# Patient Record
Sex: FEMALE | Race: WHITE | NOT HISPANIC OR LATINO | Employment: FULL TIME | URBAN - METROPOLITAN AREA
[De-identification: names, ages, dates, MRNs, and addresses within clinical notes are randomized per-mention and may not be internally consistent; named-entity substitution may affect disease eponyms.]

---

## 2017-01-06 NOTE — PROGRESS NOTES
"  SUBJECTIVE:                                                    Lucero Ku is a 31 year old female who presents to clinic today for the following health issues:      Acute Illness   Acute illness concerns: recheck ear  Onset: month     Fever: no     Chills/Sweats: no     Headache (location?): no    Sinus Pressure:no    Conjunctivitis:  no    Ear Pain: YES: left    Rhinorrhea: no     Congestion: no     Sore Throat: no      Cough: no    Wheeze: no     Decreased Appetite: no     Nausea: no     Vomiting: no     Diarrhea:  no     Dysuria/Freq.: no     Fatigue/Achiness: no     Sick/Strep Exposure: no      Therapies Tried and outcome: antibiotics, heating pads     Pt continues to have fluid in her left ear.  Hadn't been hurting, but started again in the last few days.  She just had some tooth work done on the left side, so that might be contributing.  She feels like there's a \"zit\" inside her ear.      Pt didn't notice any improvement with Flonase, decongestants, antihistamines.  Previously was on amoxicillin and Cipro otic.      Problem list and histories reviewed & adjusted, as indicated.  Additional history: as documented    Current Outpatient Prescriptions   Medication Sig Dispense Refill     desogestrel-ethinyl estradiol (APRI) 0.15-30 MG-MCG per tablet Take 1 tablet by mouth daily       fluticasone (FLONASE) 50 MCG/ACT spray Spray 1-2 sprays into both nostrils daily 16 g 11     metFORMIN (GLUCOPHAGE-XR) 500 MG 24 hr tablet Take 1 tablet (500 mg) by mouth daily (with dinner) (Patient taking differently: Take 1,000 mg by mouth daily (with dinner) ) 90 tablet 1     Recent Labs   Lab Test  11/14/16   1030  08/15/16   1008   07/14/14   0913   LDL   --    --    --   90   HDL   --    --    --   43*   TRIG   --    --    --   69   TSH  2.03  1.76   < >   --     < > = values in this interval not displayed.      BP Readings from Last 3 Encounters:   01/11/17 120/82   12/15/16 108/64   12/01/16 132/72    Wt Readings from " "Last 3 Encounters:   01/11/17 180 lb (81.647 kg)   12/15/16 186 lb 9.6 oz (84.641 kg)   11/21/16 186 lb 15.2 oz (84.8 kg)                 ROS:  Constitutional, HEENT, cardiovascular, pulmonary, gi and gu systems are negative, except as otherwise noted.    OBJECTIVE:                                                    /82 mmHg  Pulse 88  Temp(Src) 98.2  F (36.8  C) (Temporal)  Resp 16  Ht 5' 4.86\" (1.647 m)  Wt 180 lb (81.647 kg)  BMI 30.10 kg/m2  Breastfeeding? No  Body mass index is 30.1 kg/(m^2).  GENERAL: healthy, alert and no distress  EYES: Eyes grossly normal to inspection, PERRL and conjunctivae and sclerae normal  HENT: ear canals and TM's normal, nose and mouth without ulcers or lesions.  Some tympanosclerosis, slight effusion on right.    Diagnostic Test Results:  Results for orders placed or performed in visit on 11/21/16   Thyroid peroxidase antibody   Result Value Ref Range    Thyroid Peroxidase Antibody 430 (H) <35 IU/mL   Anti thyroglobulin antibody   Result Value Ref Range    Thyroglobulin Antibody <20 <40 IU/mL        ASSESSMENT/PLAN:                                                      BMI:   Estimated body mass index is 30.1 kg/(m^2) as calculated from the following:    Height as of this encounter: 5' 4.86\" (1.647 m).    Weight as of this encounter: 180 lb (81.647 kg).   Weight management plan: Discussed healthy diet and exercise guidelines and patient will follow up in 6 months in clinic to re-evaluate.        ICD-10-CM    1. Middle ear effusion, right H65.91 predniSONE (DELTASONE) 20 MG tablet     TYMPANOMETRY   2. PCOS (polycystic ovarian syndrome) E28.2 metFORMIN (GLUCOPHAGE-XR) 500 MG 24 hr tablet     1.  Chronic, but seems to possibly be resolving on tympanometry today.  Given her flight tomorrow, will treat with prednisone.  She has had issues in childhood with this, but doesn't sound like true allergy.  Will try this cautiously.  Pt is aware of potential risk, but feels " "comfortable as she's been on nasal and IM steroids previously without problem.  If her effusion isn't improving, refer to ENT for possible other treatment.  Her \"zit\" pain is not appreciated on exam today.  Need to consider possibility of trigeminal neuralgia.  2.  Refilled metformin.  Discussed possible upcoming IVF, risk of Z[place] given her 's travel to [facility] several months ago.  He has been asymptomatic, but can't state that there's no risk.            Patient Instructions   Thank you for visiting [facility]    Take the steroids until they're gone.  Let me know if problems.    If not improving, then we should get you in with ENT for possible ear tubes, but your tympanogram isn't too bad today.    Can continue other treatments as desired.    Please see me in 1  month for follow up if needed.       If you had imaging scheduled please refer to your radiology prep sheet.    Appointment    Date_______________     Time_____________    Day:   M TU W TH F    With____________________________    Location_________________________    If you need medication refills, please contact your pharmacy 3 days before your prescriptions runs out. If you are out of refills, your pharmacy will contact contact the clinic.    Contact us or return if questions or concerns.     -Your Care Team:  MD Jasmyne Lamas PA-C Folake Falaki, MD Anoshirvan Mazhari, MD Kelly White, CNP    General information about your clinic      Clinic hours:     Lab hours:  Phone [phone]  Monday 7:30 am-7 pm    Monday 8:30 am-6:30 pm  Tuesday-Friday 7:30 am-5 pm   Tuesday-Friday 8:30 am-4:30 pm    Pharmacy hours:  Phone [phone]  Monday 8:30 am-7pm  Tuesday-Friday 8:30am-6 pm                                       Mychart assistance [phone]        We would like to hear from you, how was your visit today?    Tomasa Pickett  Patient Information Supervisor   Patient Care " Supervisor  [facility], and [facility], [place], and [facility]  [phone]           Elmer Davalos MD, MD  [facility]

## 2017-01-11 ENCOUNTER — OFFICE VISIT (OUTPATIENT)
Dept: FAMILY MEDICINE | Facility: OTHER | Age: 32
End: 2017-01-11
Payer: COMMERCIAL

## 2017-01-11 VITALS
RESPIRATION RATE: 16 BRPM | BODY MASS INDEX: 29.99 KG/M2 | DIASTOLIC BLOOD PRESSURE: 82 MMHG | TEMPERATURE: 98.2 F | HEIGHT: 65 IN | SYSTOLIC BLOOD PRESSURE: 120 MMHG | HEART RATE: 88 BPM | WEIGHT: 180 LBS

## 2017-01-11 DIAGNOSIS — H65.91 MIDDLE EAR EFFUSION, RIGHT: Primary | ICD-10-CM

## 2017-01-11 DIAGNOSIS — E06.3 HASHIMOTO'S THYROIDITIS: ICD-10-CM

## 2017-01-11 DIAGNOSIS — E28.2 PCOS (POLYCYSTIC OVARIAN SYNDROME): ICD-10-CM

## 2017-01-11 LAB
T4 FREE SERPL-MCNC: 1.08 NG/DL (ref 0.76–1.46)
TSH SERPL DL<=0.05 MIU/L-ACNC: 2.9 MU/L (ref 0.4–4)

## 2017-01-11 PROCEDURE — 84443 ASSAY THYROID STIM HORMONE: CPT | Performed by: INTERNAL MEDICINE

## 2017-01-11 PROCEDURE — 99213 OFFICE O/P EST LOW 20 MIN: CPT | Performed by: FAMILY MEDICINE

## 2017-01-11 PROCEDURE — 92567 TYMPANOMETRY: CPT | Performed by: FAMILY MEDICINE

## 2017-01-11 PROCEDURE — 84439 ASSAY OF FREE THYROXINE: CPT | Performed by: INTERNAL MEDICINE

## 2017-01-11 PROCEDURE — 36415 COLL VENOUS BLD VENIPUNCTURE: CPT | Performed by: INTERNAL MEDICINE

## 2017-01-11 RX ORDER — PREDNISONE 20 MG/1
40 TABLET ORAL DAILY
Qty: 10 TABLET | Refills: 0 | Status: SHIPPED | OUTPATIENT
Start: 2017-01-11 | End: 2017-01-16

## 2017-01-11 RX ORDER — METFORMIN HCL 500 MG
1500 TABLET, EXTENDED RELEASE 24 HR ORAL
Qty: 270 TABLET | Refills: 1 | Status: SHIPPED | OUTPATIENT
Start: 2017-01-11 | End: 2017-05-31

## 2017-01-11 ASSESSMENT — PAIN SCALES - GENERAL: PAINLEVEL: MODERATE PAIN (5)

## 2017-01-11 NOTE — MR AVS SNAPSHOT
After Visit Summary   1/11/2017    Lucero Ku    MRN: [id number]           Patient Information     Date Of Birth          1985        Visit Information        Provider Department      1/11/2017 9:30 AM Elmer Davalos MD [facility]        Today's Diagnoses     Middle ear effusion, right    -  1     PCOS (polycystic ovarian syndrome)           Care Instructions    Thank you for visiting [facility]    Take the steroids until they're gone.  Let me know if problems.    If not improving, then we should get you in with ENT for possible ear tubes, but your tympanogram isn't too bad today.    Can continue other treatments as desired.    Please see me in 1  month for follow up if needed.       If you had imaging scheduled please refer to your radiology prep sheet.    Appointment    Date_______________     Time_____________    Day:   M TU W TH F    With____________________________    Location_________________________    If you need medication refills, please contact your pharmacy 3 days before your prescriptions runs out. If you are out of refills, your pharmacy will contact contact the clinic.    Contact us or return if questions or concerns.     -Your Care Team:  MD Jasmyne Lamas PA-C Folake Falaki, MD Anoshirvan Mazhari, MD Chastity Almaraz, CNP    General information about your clinic      Clinic hours:     Lab hours:  Phone [phone]  Monday 7:30 am-7 pm    Monday 8:30 am-6:30 pm  Tuesday-Friday 7:30 am-5 pm   Tuesday-Friday 8:30 am-4:30 pm    Pharmacy hours:  Phone [phone]  Monday 8:30 am-7pm  Tuesday-Friday 8:30am-6 pm                                       Mychart assistance [phone]        We would like to hear from you, how was your visit today?    Tomasa Pickett  Patient Information Supervisor   Patient Care Supervisor  [facility], and [facility], and [facility]  "[facility]  [phone]           Follow-ups after your visit        Your next 10 appointments already scheduled     Jan 20, 2017  2:00 PM   Return Visit with Santiago Escamilla MD   [email or web address] ([facility] [[email or web address]])    [address] [address] [place] [address] [phone]              Who to contact     If you have questions or need follow up information about today's clinic visit or your schedule please contact [facility] directly at [phone].  Normal or non-critical lab and imaging results will be communicated to you by [employer], letter or phone within 4 business days after the clinic has received the results. If you do not hear from us within 7 days, please contact the clinic through [employer] or phone. If you have a critical or abnormal lab result, we will notify you by phone as soon as possible.  Submit refill requests through [employer] or call your pharmacy and they will forward the refill request to us. Please allow 3 business days for your refill to be completed.          Additional Information About Your Visit        MyChart Information     [employer] gives you secure access to your electronic health record. If you see a primary care provider, you can also send messages to your care team and make appointments. If you have questions, please call your primary care clinic.  If you do not have a primary care provider, please call [phone] and they will assist you.        Care EveryWhere ID     This is your Care EveryWhere ID. This could be used by other organizations to access your [place] medical records  [id number]        Your Vitals Were     Pulse Temperature Respirations Height BMI (Body Mass Index) Breastfeeding?    88 98.2  F (36.8  C) (Temporal) 16 5' 4.86\" (1.647 m) 30.10 kg/m2 No       Blood Pressure from Last 3 Encounters:   01/11/17 120/82   12/15/16 108/64   12/01/16 132/72    Weight from Last 3 Encounters:   01/11/17 " 180 lb (81.647 kg)   12/15/16 186 lb 9.6 oz (84.641 kg)   11/21/16 186 lb 15.2 oz (84.8 kg)              Today, you had the following     No orders found for display         Today's Medication Changes          These changes are accurate as of: 1/11/17  9:41 AM.  If you have any questions, ask your nurse or doctor.               Start taking these medicines.        Dose/Directions    predniSONE 20 MG tablet   Commonly known as:  DELTASONE   Used for:  Middle ear effusion, right   Started by:  Elmer Davalos MD        Dose:  40 mg   Take 2 tablets (40 mg) by mouth daily for 5 days   Quantity:  10 tablet   Refills:  0         These medicines have changed or have updated prescriptions.        Dose/Directions    metFORMIN 500 MG 24 hr tablet   Commonly known as:  GLUCOPHAGE-XR   This may have changed:  how much to take   Used for:  PCOS (polycystic ovarian syndrome)   Changed by:  Elmer Davalos MD        Dose:  1500 mg   Take 3 tablets (1,500 mg) by mouth daily (with dinner)   Quantity:  270 tablet   Refills:  1            Where to get your medicines      These medications were sent to [facility]  [facility] - [address]   [address] [place] [address]     Phone:  [phone]    - metFORMIN 500 MG 24 hr tablet  - predniSONE 20 MG tablet             Primary Care Provider    None       No address on file        Thank you!     Thank you for choosing [facility]  for your care. Our goal is always to provide you with excellent care. Hearing back from our patients is one way we can continue to improve our services. Please take a few minutes to complete the written survey that you may receive in the mail after your visit with us. Thank you!             Your Updated Medication List - Protect others around you: Learn how to safely use, store and throw away your medicines at www.disposemymeds.org.          This list is accurate as of: 1/11/17  9:41 AM.  Always  use your most recent med list.                   Brand Name Dispense Instructions for use    desogestrel-ethinyl estradiol 0.15-30 MG-MCG per tablet    APRI     Take 1 tablet by mouth daily       fluticasone 50 MCG/ACT spray    FLONASE    16 g    Spray 1-2 sprays into both nostrils daily       metFORMIN 500 MG 24 hr tablet    GLUCOPHAGE-XR    270 tablet    Take 3 tablets (1,500 mg) by mouth daily (with dinner)       predniSONE 20 MG tablet    DELTASONE    10 tablet    Take 2 tablets (40 mg) by mouth daily for 5 days

## 2017-01-11 NOTE — PATIENT INSTRUCTIONS
Thank you for visiting [facility]    Take the steroids until they're gone.  Let me know if problems.    If not improving, then we should get you in with ENT for possible ear tubes, but your tympanogram isn't too bad today.    Can continue other treatments as desired.    Please see me in 1  month for follow up if needed.       If you had imaging scheduled please refer to your radiology prep sheet.    Appointment    Date_______________     Time_____________    Day:   M TU W TH F    With____________________________    Location_________________________    If you need medication refills, please contact your pharmacy 3 days before your prescriptions runs out. If you are out of refills, your pharmacy will contact contact the clinic.    Contact us or return if questions or concerns.     -Your Care Team:  MD Jasmyne Lamas PA-C Folake Falaki, MD Anoshirvan Mazhari, MD Kelly White, NOLBERTO    General information about your clinic      Clinic hours:     Lab hours:  Phone [phone]  Monday 7:30 am-7 pm    Monday 8:30 am-6:30 pm  Tuesday-Friday 7:30 am-5 pm   Tuesday-Friday 8:30 am-4:30 pm    Pharmacy hours:  Phone [phone]  Monday 8:30 am-7pm  Tuesday-Friday 8:30am-6 pm                                       Mychart assistance [phone]        We would like to hear from you, how was your visit today?    Tomasa Pickett  Patient Information Supervisor   Patient Care Supervisor  [facility] [place], and [facility], and [facility]  [phone]

## 2017-01-11 NOTE — NURSING NOTE
"Chief Complaint   Patient presents with     Ear Problem     RECHECK     Panel Management     up to date       Initial /82 mmHg  Pulse 88  Temp(Src) 98.2  F (36.8  C) (Temporal)  Resp 16  Ht 5' 4.86\" (1.647 m)  Wt 180 lb (81.647 kg)  BMI 30.10 kg/m2  Breastfeeding? No Estimated body mass index is 30.1 kg/(m^2) as calculated from the following:    Height as of this encounter: 5' 4.86\" (1.647 m).    Weight as of this encounter: 180 lb (81.647 kg).  BP completed using cuff size: regular      Allison Ignacio LPN      "

## 2017-01-20 ENCOUNTER — OFFICE VISIT (OUTPATIENT)
Dept: ENDOCRINOLOGY | Facility: CLINIC | Age: 32
End: 2017-01-20
Payer: COMMERCIAL

## 2017-01-20 VITALS
HEART RATE: 90 BPM | HEIGHT: 64 IN | SYSTOLIC BLOOD PRESSURE: 141 MMHG | BODY MASS INDEX: 32.41 KG/M2 | DIASTOLIC BLOOD PRESSURE: 82 MMHG | WEIGHT: 189.82 LBS

## 2017-01-20 DIAGNOSIS — E03.9 ACQUIRED HYPOTHYROIDISM: Primary | ICD-10-CM

## 2017-01-20 PROCEDURE — 99213 OFFICE O/P EST LOW 20 MIN: CPT | Performed by: INTERNAL MEDICINE

## 2017-01-20 NOTE — MR AVS SNAPSHOT
After Visit Summary   1/20/2017    Lucero Ku    MRN: [id number]           Patient Information     Date Of Birth          1985        Visit Information        Provider Department      1/20/2017 2:00 PM Santiago Escamilla MD [facility]        Today's Diagnoses     Acquired hypothyroidism    -  1       Care Instructions    Labs prior to the next visit.     Thank you for choosing the [facility] Department of Endocrinology. It has been a pleasure servicing you today. Please contact us at [phone] with any questions. If you need to speak to an Endocrinologist and it is after 5:00 pm or on a weekend, please call the On-Call Endocrinologist at [phone].          Follow-ups after your visit        Follow-up notes from your care team     Return in about 3 months (around 4/20/2017).      Your next 10 appointments already scheduled     Feb 01, 2017  9:00 AM   New Visit with Vamshi Wright MD   [facility]    [address] [place] [address] [phone]            Apr 21, 2017 10:30 AM   LAB with NL LAB [facility] ([facility])    [address] [facility] [address] [phone]           Patient must bring picture ID.  Patient should be prepared to give a urine specimen  Please do not eat 10-12 hours before your appointment if you are coming in fasting for labs on lipids, cholesterol, or glucose (sugar).  Pregnant women should follow their Care Team instructions. Water with medications is okay. Do not drink coffee or other fluids.   If you have concerns about taking  your medications, please ask at office or if scheduling via [employer], send a message by clicking on Secure Messaging, Message Your Care Team.            Apr 28, 2017  8:30 AM   Return Visit with MD PROSPER Luo [facility] RAMÍREZ  [facility]    [address] [address] [place] [address] [phone]              Future tests that were ordered for you today     Open Standing Orders        Priority Remaining Interval Expires Ordered    TSH with free T4 reflex Routine 4/4 12 months 1/20/2018 1/20/2017          Open Future Orders        Priority Expected Expires Ordered    Tissue transglutaminase maulik IgA and IgG Routine  1/20/2018 1/20/2017            Who to contact     If you have questions or need follow up information about today's clinic visit or your schedule please contact [facility] directly at [phone].  Normal or non-critical lab and imaging results will be communicated to you by [employer], letter or phone within 4 business days after the clinic has received the results. If you do not hear from us within 7 days, please contact the clinic through [employer] or phone. If you have a critical or abnormal lab result, we will notify you by phone as soon as possible.  Submit refill requests through [employer] or call your pharmacy and they will forward the refill request to us. Please allow 3 business days for your refill to be completed.          Additional Information About Your Visit        [employer] Information     [employer] gives you secure access to your electronic health record. If you see a primary care provider, you can also send messages to your care team and make appointments. If you have questions, please call your primary care clinic.  If you do not have a primary care provider, please call [phone] and they will assist you.      [employer] is an electronic gateway that provides easy, online access to your medical records. With [employer], you can request a clinic appointment, read your test results, renew a prescription or communicate with your care team.     To access your existing account, please contact your [facility] or call [phone] for assistance.        Care  "EveryWhere ID     This is your Care EveryWhere ID. This could be used by other organizations to access your [place] medical records  [id number]        Your Vitals Were     Pulse Height BMI (Body Mass Index) Last Period          90 1.626 m (5' 4\") 32.57 kg/m2 01/18/2017         Blood Pressure from Last 3 Encounters:   01/20/17 141/82   01/11/17 120/82   12/15/16 108/64    Weight from Last 3 Encounters:   01/20/17 86.1 kg (189 lb 13.1 oz)   01/11/17 81.647 kg (180 lb)   12/15/16 84.641 kg (186 lb 9.6 oz)               Primary Care Provider    None       No address on file        Thank you!     Thank you for choosing [facility]  for your care. Our goal is always to provide you with excellent care. Hearing back from our patients is one way we can continue to improve our services. Please take a few minutes to complete the written survey that you may receive in the mail after your visit with us. Thank you!             Your Updated Medication List - Protect others around you: Learn how to safely use, store and throw away your medicines at www.disposemymeds.org.          This list is accurate as of: 1/20/17  2:44 PM.  Always use your most recent med list.                   Brand Name Dispense Instructions for use    desogestrel-ethinyl estradiol 0.15-30 MG-MCG per tablet    APRI     Take 1 tablet by mouth daily       fluticasone 50 MCG/ACT spray    FLONASE    16 g    Spray 1-2 sprays into both nostrils daily       metFORMIN 500 MG 24 hr tablet    GLUCOPHAGE-XR    270 tablet    Take 3 tablets (1,500 mg) by mouth daily (with dinner)         "

## 2017-01-20 NOTE — PROGRESS NOTES
Endocrinology Follow up note:     Lucero Little is a pleasant 31 year old female who presents for a follow up for Hashimotos thyroiditis. Elevated TSH in June 2015.  However, she decided to wait prior to starting treatment.  TSH gradually improved and by January 2016 was normal.    Patient was having fatigue and some cold intolerance along with constipation when the TSH was high.  But she was not sure if this was related to her thyroid condition.During the workup TPO antibody was checked and was found to be elevated at 1545.    Lucero has a diagnosis of polycystic ovarian syndrome leading to infertility.  She currently takes metformin.  She had twins have her in vitro fertilization and is planning conception.  Since she traveled to [place] endemic area, the plan for in vitro fertilization has been postponed until April 2017.    Her recent thyroid hormone levels for been normal  Results for LUCERO LITTLE (MRN [id number]) as of 1/20/2017 15:39   Ref. Range 8/15/2016 10:08 11/14/2016 10:30 11/21/2016 12:21 1/11/2017 09:00   TSH Latest Ref Range: 0.40-4.00 mU/L 1.76 2.03  2.90   Thyroid Peroxidase Antibody Latest Ref Range: <35 IU/mL   430 (H)      Currently pt notes no dysphagia or neck fullness or pain or discomfort.  No changes recently in hair growth.  No recent changes in BMs.  No changes to temp perception. No racing heart or palpitations.    PMH/ reviewed and includes the following--  Past Medical History   Diagnosis Date     Infertility, female      in vitro, tiwn birth     PCOS (polycystic ovarian syndrome)        Soc Hx/ reviewed and includes the following--  Social History   Substance Use Topics     Smoking status: Never Smoker      Smokeless tobacco: Never Used      Comment: No smokers in home.     Alcohol Use: Yes      Comment: social       Fam Hx reviewed and includes the following--  Family History     Problem (# of Occurrences) Relation (Name,Age of Onset)    CANCER (3) Father (55): Stage IV  "Kidney (9/13), Paternal Grandmother, Paternal Grandfather    DIABETES (1) Maternal Grandfather    Hypertension (7) Mother, Father, Sister, Brother, Maternal Grandfather, Paternal Grandmother, Paternal Grandfather    Lipids (1) Maternal Grandfather: Defibrilator    Thyroid Disease (3) Father: hypothyroidism, Paternal Grandmother, Paternal Grandfather            ROS: constitutional/HEENT/repro/endo reviewed completely with pertinents as above.    PE:  Gen: NAD,  /82 mmHg  Pulse 90  Ht 1.626 m (5' 4\")  Wt 86.1 kg (189 lb 13.1 oz)  BMI 32.57 kg/m2  LMP 01/18/2017  HEENT: eomi, no scleral icteris or proptosis  Neck: s/nt.  Thyroid is palpable but without any discrete nodules.  Lymph: no cervical or supraclvicular LAD  Abd: nondistended  Ext: no tremors on outstretched hand, extremities are warm  Neuro: no resting tremor, normal gait,  nonfocal otherwise  skin: normal temp/turgor/thickness. No rashes on exposed skin.    Data:  as above    (results reviewed/discussed/explained during her visit)    A/P:   Hashimoto's thyroiditis, normal function, IVF planned at end of April 2017.  At present her TSH is less than three and does not have any symptoms of hypothyroidism.  It is unlikely that she would have any benefit from thyroid hormone supplements to improve chances of successful pregnancy.  We discussed this in great detail with the plan to recheck thyroid function test  A few weeks prior to planned procedure.   We will follow up every trimester after conception.    Santiago Escamilla MD  [address]  Endocrinology Service      "

## 2017-01-20 NOTE — PATIENT INSTRUCTIONS
Labs prior to the next visit.     Thank you for choosing the [facility], Department of Endocrinology. It has been a pleasure servicing you today. Please contact us at [phone] with any questions. If you need to speak to an Endocrinologist and it is after 5:00 pm or on a weekend, please call the On-Call Endocrinologist at [phone].

## 2017-01-20 NOTE — NURSING NOTE
"Lucero Ku's goals for this visit include: Establish Care Hashimotos  She requests these members of her care team be copied on today's visit information: NO    PCP: None    Referring Provider:  No referring provider defined for this encounter.    Chief Complaint   Patient presents with     Thyroid Problem       Initial Ht 1.626 m (5' 4\")  Wt 86.1 kg (189 lb 13.1 oz)  BMI 32.57 kg/m2  LMP 01/18/2017 Estimated body mass index is 32.57 kg/(m^2) as calculated from the following:    Height as of this encounter: 1.626 m (5' 4\").    Weight as of this encounter: 86.1 kg (189 lb 13.1 oz).  BP completed using cuff size: regular    Do you need any medication refills at today's visit? NO    "

## 2017-02-01 ENCOUNTER — OFFICE VISIT (OUTPATIENT)
Dept: AUDIOLOGY | Facility: OTHER | Age: 32
End: 2017-02-01
Payer: COMMERCIAL

## 2017-02-01 ENCOUNTER — OFFICE VISIT (OUTPATIENT)
Dept: OTOLARYNGOLOGY | Facility: OTHER | Age: 32
End: 2017-02-01
Payer: COMMERCIAL

## 2017-02-01 ENCOUNTER — OFFICE VISIT (OUTPATIENT)
Dept: ALLERGY | Facility: OTHER | Age: 32
End: 2017-02-01
Payer: COMMERCIAL

## 2017-02-01 VITALS
WEIGHT: 186.5 LBS | OXYGEN SATURATION: 100 % | HEIGHT: 65 IN | DIASTOLIC BLOOD PRESSURE: 72 MMHG | BODY MASS INDEX: 31.07 KG/M2 | SYSTOLIC BLOOD PRESSURE: 112 MMHG | HEART RATE: 85 BPM

## 2017-02-01 VITALS — HEART RATE: 70 BPM | BODY MASS INDEX: 32.43 KG/M2 | WEIGHT: 189 LBS | OXYGEN SATURATION: 100 %

## 2017-02-01 DIAGNOSIS — H92.01 PAIN IN RIGHT EAR: ICD-10-CM

## 2017-02-01 DIAGNOSIS — J31.0 CHRONIC RHINITIS: Primary | ICD-10-CM

## 2017-02-01 DIAGNOSIS — J31.0 CHRONIC RHINITIS: ICD-10-CM

## 2017-02-01 DIAGNOSIS — H69.91 DYSFUNCTION OF EUSTACHIAN TUBE, RIGHT: ICD-10-CM

## 2017-02-01 DIAGNOSIS — H93.8X1 SENSATION OF FULLNESS IN RIGHT EAR: Primary | ICD-10-CM

## 2017-02-01 DIAGNOSIS — Z01.10 EXAMINATION OF EARS AND HEARING: ICD-10-CM

## 2017-02-01 DIAGNOSIS — H69.91 ETD (EUSTACHIAN TUBE DYSFUNCTION), RIGHT: ICD-10-CM

## 2017-02-01 PROCEDURE — 36415 COLL VENOUS BLD VENIPUNCTURE: CPT | Performed by: ALLERGY & IMMUNOLOGY

## 2017-02-01 PROCEDURE — 99207 ZZC NO CHARGE LOS: CPT | Performed by: AUDIOLOGIST

## 2017-02-01 PROCEDURE — 99000 SPECIMEN HANDLING OFFICE-LAB: CPT | Performed by: ALLERGY & IMMUNOLOGY

## 2017-02-01 PROCEDURE — 92553 AUDIOMETRY AIR & BONE: CPT | Performed by: AUDIOLOGIST

## 2017-02-01 PROCEDURE — 92567 TYMPANOMETRY: CPT | Performed by: AUDIOLOGIST

## 2017-02-01 PROCEDURE — 95004 PERQ TESTS W/ALRGNC XTRCS: CPT | Performed by: ALLERGY & IMMUNOLOGY

## 2017-02-01 PROCEDURE — 86003 ALLG SPEC IGE CRUDE XTRC EA: CPT | Performed by: ALLERGY & IMMUNOLOGY

## 2017-02-01 PROCEDURE — 31231 NASAL ENDOSCOPY DX: CPT | Performed by: OTOLARYNGOLOGY

## 2017-02-01 PROCEDURE — 92555 SPEECH THRESHOLD AUDIOMETRY: CPT | Performed by: AUDIOLOGIST

## 2017-02-01 PROCEDURE — 99203 OFFICE O/P NEW LOW 30 MIN: CPT | Mod: 25 | Performed by: ALLERGY & IMMUNOLOGY

## 2017-02-01 PROCEDURE — 99203 OFFICE O/P NEW LOW 30 MIN: CPT | Mod: 25 | Performed by: OTOLARYNGOLOGY

## 2017-02-01 RX ORDER — MONTELUKAST SODIUM 10 MG/1
10 TABLET ORAL AT BEDTIME
Qty: 30 TABLET | Refills: 3 | Status: SHIPPED | OUTPATIENT
Start: 2017-02-01 | End: 2017-07-24

## 2017-02-01 NOTE — NURSING NOTE
"Chief Complaint   Patient presents with     Consult     Ear       Initial /72 mmHg  Pulse 85  Ht 5' 4.69\" (1.643 m)  Wt 186 lb 8 oz (84.596 kg)  BMI 31.34 kg/m2  SpO2 100%  LMP 01/18/2017 Estimated body mass index is 31.34 kg/(m^2) as calculated from the following:    Height as of this encounter: 5' 4.69\" (1.643 m).    Weight as of this encounter: 186 lb 8 oz (84.596 kg).  BP completed using cuff size: coretta Siddiqui MA      "

## 2017-02-01 NOTE — NURSING NOTE
"Chief Complaint   Patient presents with     Consult     Ear Problem     Recurrent ear infections.       Initial Pulse 70  Wt 85.73 kg (189 lb)  SpO2 100%  LMP 01/18/2017 Estimated body mass index is 32.43 kg/(m^2) as calculated from the following:    Height as of 1/20/17: 1.626 m (5' 4\").    Weight as of this encounter: 85.73 kg (189 lb).  BP completed using cuff size: NA (Not Taken)  "

## 2017-02-01 NOTE — ASSESSMENT & PLAN NOTE
Spring and fall rhinorrhea, congestion, sneezing, nasal itching. No ocular symptoms. Increased symptoms around raking leaves, hay and mowing grass. Also with postnasal drip. Increased right-sided ear pressure and pain over the last 1-2 months. Has had bloody nasal discharge and congestion on the right side. More postnasal drip over the last 1-2 months. Eustachian tube dysfunction. Flonase 2 sprays per nostril daily has been used for one month and not clearly helpful. Prednisone was not clearly helpful. Montelukast was started today. Antihistamine has not been helpful.    Skin testing positive for camille and cedar. All else negative.     - Skin testing results not consistent with symptoms. Will check with serum IgE for environmental allergens given symptoms consistent with mold allergy (raking leaves, hay and mowing grass bothers her).  - Changing from Flonase to Rhinocort 2 sprays/nostril twice daily. Using for the next 1 month of eustachian tube dysfunction. Following with ENT.   - Agree with trial of montelukast.   - Use Rhinocort 2 sprays/nostril daily in the spring and fall.   - Oral antihistamine as needed.

## 2017-02-01 NOTE — PROGRESS NOTES
"  SUBJECTIVE:                                                    Lucero Ku is a 31 year old female who presents to clinic today for the following health issues:      Right side otitis      Duration: Sx started end of November 2016    Description (location/character/radiation): Right ear pain, neck and face pain    Intensity:  mild    Accompanying signs and symptoms: Ear feels plugged.     History (similar episodes/previous evaluation): Otitis as a kid    Precipitating or alleviating factors: None    Therapies tried and outcome: Antibiotics, steroids Did not seem to help.        {additional problems for provider to add:855600}    Problem list and histories reviewed & adjusted, as indicated.  Additional history: {NONE - AS DOCUMENTED:067169::\"as documented\"}    {HIST REVIEW/ LINKS 2:805156}    {PROVIDER CHARTING PREFERENCE:033806}  "

## 2017-02-01 NOTE — PROGRESS NOTES
"    History of Present Illness - Lucero Ku is a 31 year old female who presents with concerns about right ear symptoms. The main symptom recently has been pressure plugging in the right ear. As a child she had several sets of tubes. In Dec she had \"ear\" infection on the right with 3 episodes of D/C clear and bloody tried abx and drops. However, still has pressure on the right with popping on occasion. No tinnitus or vertigo.  She tried oral steroids and uses Flonase but no relief.  She denies seasonal or perennial allergies.  Still has a lot of d/c postnasal lele right side and nasal congestion but no other sinusitis symptoms.  Past Medical History -   Patient Active Problem List   Diagnosis     S/P  section     PCOS (polycystic ovarian syndrome)     Screening for lipoid disorders     Hashimoto's thyroiditis       Current Medications -   Current outpatient prescriptions:      metFORMIN (GLUCOPHAGE-XR) 500 MG 24 hr tablet, Take 3 tablets (1,500 mg) by mouth daily (with dinner), Disp: 270 tablet, Rfl: 1     desogestrel-ethinyl estradiol (APRI) 0.15-30 MG-MCG per tablet, Take 1 tablet by mouth daily, Disp: , Rfl:      fluticasone (FLONASE) 50 MCG/ACT spray, Spray 1-2 sprays into both nostrils daily, Disp: 16 g, Rfl: 11    Allergies -   Allergies   Allergen Reactions     Hydrocodone-Acetaminophen Nausea and Vomiting     Reaction after taking Vicodin for tooth extraction.  Can take plain Tylenol.       Social History -   Social History     Social History     Marital Status:      Spouse Name: N/A     Number of Children: 2     Years of Education: N/A     Occupational History           [employer]     Social History Main Topics     Smoking status: Never Smoker      Smokeless tobacco: Never Used      Comment: No smokers in home.     Alcohol Use: Yes      Comment: social     Drug Use: No     Sexual Activity:     Partners: Male     Birth Control/ Protection: Pill     Other Topics Concern     " Parent/Sibling W/ Cabg, Mi Or Angioplasty Before 65f 55m? No      Service No     Blood Transfusions No     Caffeine Concern No     Occupational Exposure Yes     FTR[employer]     Hobby Hazards No     Sleep Concern No     Stress Concern No     Weight Concern No     Special Diet No     Back Care No     Exercise No     Seat Belt Yes     Social History Narrative     to [place] and lives in [place].  No smokers in the home.  No concerns about domestic violence.  Has an indoor cat.  Aware of toxoplasmosis precautions.         Family History -   Family History   Problem Relation Age of Onset     Hypertension Mother      Hypertension Father      CANCER Father 55     Stage IV Kidney (9/13)     Thyroid Disease Father      hypothyroidism     Hypertension Sister      Hypertension Brother      DIABETES Maternal Grandfather      Hypertension Maternal Grandfather      Lipids Maternal Grandfather      Defibrilator     CANCER Paternal Grandmother      Thyroid Disease Paternal Grandmother      Hypertension Paternal Grandmother      CANCER Paternal Grandfather      Thyroid Disease Paternal Grandfather      Hypertension Paternal Grandfather        Review of Systems - As per HPI and PMHx, otherwise system review of the head and neck negative.    Physical Exam  Vitals: Pulse 70  Wt 85.73 kg (189 lb)  SpO2 100%  LMP 01/18/2017  BMI= Body mass index is 32.43 kg/(m^2).    General - The patient is well nourished and well developed, and appears to have good nutritional status.  Alert and oriented to person and place, answers questions and cooperates with examination appropriately.   Head and Face - Normocephalic and atraumatic, with no gross asymmetry noted of the contour of the facial features.  The facial nerve is intact, with strong symmetric movements.  Voice and Breathing - The patient was breathing comfortably without the use of accessory muscles. There was no wheezing, stridor, or stertor.  The patients voice was clear  and strong, and had appropriate pitch and quality.  Ears - Bilateral pinna and EACs with normal appearing overlying skin. Tympanic membrane intact with good mobility on pneumatic otoscopy bilaterally. Bony landmarks of the ossicular chain are normal. The tympanic membranes are normal in appearance. No retraction, perforation, or masses.  No fluid or purulence was seen in the external canal or the middle ear.   Eyes - Extraocular movements intact.  Sclera were not icteric or injected, conjunctiva were pink and moist.  Mouth - Examination of the oral cavity showed pink, healthy oral mucosa. No lesions or ulcerations noted.  The tongue was mobile and midline, and the dentition were in good condition.    Throat - The walls of the oropharynx were smooth, pink, moist, symmetric, and had no lesions or ulcerations.  The tonsillar pillars and soft palate were symmetric.  The uvula was midline on elevation.  Neck - Normal midline excursion of the laryngotracheal complex during swallowing.  Full range of motion on passive movement.  Palpation of the occipital, submental, submandibular, internal jugular chain, and supraclavicular nodes did not demonstrate any abnormal lymph nodes or masses.  The carotid pulse was palpable bilaterally.  Palpation of the thyroid was soft and smooth, with no nodules or goiter appreciated.  The trachea was mobile and midline.  Nose - External contour is symmetric, no gross deflection or scars.  Nasal mucosa is SOMEWHAT PALE and moist with no abnormal mucus.  The septum was midline and non-obstructive, turbinates of enlarged size .  No polyps, masses, or purulence noted on examination.  Neuro - normal muscle tone, gait, CN 2 THROUGH 12 INTACT    Audio: NORMAL thresholds and tymps    To further evaluate the nasal cavity, I performed rigid nasal endoscopy.  I first sprayed the nasal cavity bilaterally with a mix of lidocaine and neosynephrine.  I then began on the left side using a 2.7mm, 30 degree  "rigid nasal endoscope.  The septum was fairly straight and the nasal airway was open.  No abnormal secretions, purulence, or polyps were noted. The left middle turbinate and middle meatus were clearly visualized and normal in appearance.  Looking up, the olfactory cleft was unobstructed.  Going further back, the sphenoethmoid recess was normal in appearance, with healthy appearing mucosa on the face of the sphenoid.  The nasopharynx was unremarkable, and the eustachian tube opening on this side was unobstructed. Eustachian tube areas clear.    I then turned my attention to the right side.  Once again, the septum was fairly straight, and the airway was open.  No abnormal secretions, purulence, polyps were noted.  The right middle turbinate and middle meatus were clearly visualized and normal in appearance.  Looking up, the olfactory cleft was unobstructed.  Going further back the right sphenoethmoid recess was normal in appearance, and eustachian tube opening was unobstructed.      ASSESSMENT/PLAN:  Lucero Ku is a 31 year old female with 2 moths h/o \"plugged\" right ear with right ETD  Dysfunction. Possibly allergic rhinitis. Will start Singulair 10 mg and increase Flonase to BID. She will see Dr. Yuan for further allergy assessment.    Return in 6 weeks  Vamshi Wright MD    "

## 2017-02-01 NOTE — MR AVS SNAPSHOT
After Visit Summary   2/1/2017    Lucero Ku    MRN: [id number]           Patient Information     Date Of Birth          1985        Visit Information        Provider Department      2/1/2017 9:00 AM Vamshi Wright MD [facility]        Today's Diagnoses     Chronic rhinitis         ETD (eustachian tube dysfunction), right            Follow-ups after your visit        Additional Services     AUDIOLOGY ADULT REFERRAL                 Your next 10 appointments already scheduled     Apr 21, 2017 10:30 AM   LAB with NL LAB [facility] ([facility]    [address] [facility] [address] [phone]           Patient must bring picture ID.  Patient should be prepared to give a urine specimen  Please do not eat 10-12 hours before your appointment if you are coming in fasting for labs on lipids, cholesterol, or glucose (sugar).  Pregnant women should follow their Care Team instructions. Water with medications is okay. Do not drink coffee or other fluids.   If you have concerns about taking  your medications, please ask at office or if scheduling via [employer], send a message by clicking on Secure Messaging, Message Your Care Team.            Apr 28, 2017  8:30 AM   [facility]    [id number] [place] [id number] [phone]              Who to contact     If you have questions or need follow up information about today's clinic visit or your schedule please contact [facility] directly at [phone].  Normal or non-critical lab and imaging results will be communicated to you by MyChart, letter or phone within 4 business days after the clinic has received the results. If you do not hear from us within 7 days, please contact the clinic through MyChart or phone. If you have a critical or abnormal lab result,  we will notify you by phone as soon as possible.  Submit refill requests through [employer] or call your pharmacy and they will forward the refill request to us. Please allow 3 business days for your refill to be completed.          Additional Information About Your Visit        [employer] Information     [employer] gives you secure access to your electronic health record. If you see a primary care provider, you can also send messages to your care team and make appointments. If you have questions, please call your primary care clinic.  If you do not have a primary care provider, please call [phone] and they will assist you.        Care EveryWhere ID     This is your Care EveryWhere ID. This could be used by other organizations to access your [facility] medical records  [id number]        Your Vitals Were     Pulse Pulse Oximetry Last Period             70 100% 01/18/2017          Blood Pressure from Last 3 Encounters:   02/01/17 112/72   01/20/17 141/82   01/11/17 120/82    Weight from Last 3 Encounters:   02/01/17 84.596 kg (186 lb 8 oz)   02/01/17 85.73 kg (189 lb)   01/20/17 86.1 kg (189 lb 13.1 oz)              We Performed the Following     AUDIOLOGY ADULT REFERRAL     NASAL ENDOSCOPY, DIAGNOSTIC          Today's Medication Changes          These changes are accurate as of: 2/1/17 11:06 AM.  If you have any questions, ask your nurse or doctor.               Start taking these medicines.        Dose/Directions    montelukast 10 MG tablet   Commonly known as:  SINGULAIR   Used for:  ETD (eustachian tube dysfunction), right, Chronic rhinitis   Started by:  Vamshi Wright MD        Dose:  10 mg   Take 1 tablet (10 mg) by mouth At Bedtime   Quantity:  30 tablet   Refills:  3            Where to get your medicines      These medications were sent to [facility] [place], [place] - [address] [facility]  [address] [facility] [address]     Phone:  [phone]    - montelukast 10 MG tablet             Primary  Care Provider    None       No address on file        Thank you!     Thank you for choosing [facility]  for your care. Our goal is always to provide you with excellent care. Hearing back from our patients is one way we can continue to improve our services. Please take a few minutes to complete the written survey that you may receive in the mail after your visit with us. Thank you!             Your Updated Medication List - Protect others around you: Learn how to safely use, store and throw away your medicines at www.disposemymeds.org.          This list is accurate as of: 2/1/17 11:06 AM.  Always use your most recent med list.                   Brand Name Dispense Instructions for use    desogestrel-ethinyl estradiol 0.15-30 MG-MCG per tablet    APRI     Take 1 tablet by mouth daily       fluticasone 50 MCG/ACT spray    FLONASE    16 g    Spray 1-2 sprays into both nostrils daily       metFORMIN 500 MG 24 hr tablet    GLUCOPHAGE-XR    270 tablet    Take 3 tablets (1,500 mg) by mouth daily (with dinner)       montelukast 10 MG tablet    SINGULAIR    30 tablet    Take 1 tablet (10 mg) by mouth At Bedtime

## 2017-02-01 NOTE — MR AVS SNAPSHOT
After Visit Summary   2/1/2017    Lucero Ku    MRN: [id number]           Patient Information     Date Of Birth          1985        Visit Information        Provider Department      2/1/2017 10:20 AM Brice Yuan,  [facility]        Today's Diagnoses     Chronic rhinitis    -  1     Dysfunction of eustachian tube, right           Care Instructions    If you have any questions regarding your allergies, asthma, or what we discussed during your visit today please call the allergy clinic or contact us via [employer].    [facility]: [phone]  [facility]: [phone]  [facility]: [phone]    Allergy RN Line ([facility]): [phone]    1. Agree Rhinocort 2 sprays/nostril twice daily. Use spring and fall 2 sprays/nostril daily.   2. Agree with Singulair.   3. Blood testing today.     AEROALLERGEN AVOIDANCE INSTRUCTIONS  MOLD  Indoors, mold season is year round. Outdoors, most mold prefer seasons with high humidity. Mold prefers damp, dark, warm places. Here are some tips on how to avoid mold exposure.  1.  Keep humidity inside between 35-50% with air conditioning or dehumidifier. The humidity level can be checked with a meter from a hardware store.   2.  Clean surfaces where mold grows and dry wet areas.  3.  Avoid steam cleaning carpets and discard moldy belongings.  4.  Wear a mask when doing yard work and refrain from walking through uncut fields or playing in leaves.  5.  Minimize use of potted plants and do not keep them indoors.  6.  Consider an allergy cover for the pillow and mattress.  POLLEN  Pollens are the tiny airborne particles given off by trees, weeds, and grasses. They can be the cause of seasonal allergic rhinitis or hay fever symptoms, which include stuffy, itchy, runny nose, redness, swelling and itching of the eyes, and itching of the ears and throat. Here are some tips on how to avoid pollen  exposure.  1. .Keep windows closed and use the air conditioner when possible.  2.  Avoid outside exposure in the early morning as pollen counts are highest at that time.  3.  Take a shower and wash hair each night.  4.  Consider wearing a mask when working in the yard and/or garden.  5.  Clean furnace filter monthly with HEPA filters. Consider a HEPA filter vacuum  which will prevent pollen from being reintroduced into the air.   DUST MITES  Dust mites can never be entirely eliminated in the house no matter how clean your house is. Dust mites are attracted to warm, moist areas and feed on dead skin flakes. Here are tips to minimize dust mites in your home.  1.  Encase pillows and mattress/box springs in zippered allergy covers.  2.  Wash bedding in hot water (at least 130 F) every 7-14 days.  3.  Avoid curtains, carpet, and upholstered furniture if possible.  4.  Use HEPA air filters and a HEPA filter vacuum . Change filters monthly. Vacuum weekly.  5.  Keep bedroom simple, avoiding clutter, so it can quickly be dusted.  6.  Cover heating vents with vent filters.  7.  Keep stuffed toys in a closed container and wash or freeze regularly.  8.  Keep clothing in the closet with the door closed.  PETS  Pets present many problems for people with allergies. Dander from pets is very difficult to remove and also is a food source for dust mites.  1.  If possible, find the pet a new home.  2.  If not possible, keep the pet outdoors. Never allow the pet into the bedroom.  3.  Wash pet weekly in warm water.  4.  Encase mattresses, pillows, and box springs in allergen-proof covers.  5.  Use HEPA air filters and a HEPA filter vacuum . Change filters monthly.            Follow-ups after your visit        Your next 10 appointments already scheduled     Apr 21, 2017 10:30 AM   LAB with NL LAB [facility] ([facility])    [address] [facility] [address]    [phone]           Patient must bring picture ID.  Patient should be prepared to give a urine specimen  Please do not eat 10-12 hours before your appointment if you are coming in fasting for labs on lipids, cholesterol, or glucose (sugar).  Pregnant women should follow their Care Team instructions. Water with medications is okay. Do not drink coffee or other fluids.   If you have concerns about taking  your medications, please ask at office or if scheduling via [employer], send a message by clicking on Secure Messaging, Message Your Care Team.            Apr 28, 2017  8:30 AM   Return Visit with Santiago Escamilla MD   [facility] ([facility])    [address] [place] [id number] [phone]              Who to contact     If you have questions or need follow up information about today's clinic visit or your schedule please contact [facility] directly at [phone].  Normal or non-critical lab and imaging results will be communicated to you by MyPar[employer]ay[employer], letter or phone within 4 business days after the clinic has received the results. If you do not hear from us within 7 days, please contact the clinic through [employer] or phone. If you have a critical or abnormal lab result, we will notify you by phone as soon as possible.  Submit refill requests through [employer] or call your pharmacy and they will forward the refill request to us. Please allow 3 business days for your refill to be completed.          Additional Information About Your Visit        [employer] Information     [employer] gives you secure access to your electronic health record. If you see a primary care provider, you can also send messages to your care team and make appointments. If you have questions, please call your primary care clinic.  If you do not have a primary care provider, please call [phone] and they will assist you.        Care EveryWhere ID     This is your Care  "EveryWhere ID. This could be used by other organizations to access your [facility] medical records  [id number]        Your Vitals Were     Pulse Height BMI (Body Mass Index) Pulse Oximetry Last Period       85 5' 4.69\" (1.643 m) 31.34 kg/m2 100% 01/18/2017        Blood Pressure from Last 3 Encounters:   02/01/17 112/72   01/20/17 141/82   01/11/17 120/82    Weight from Last 3 Encounters:   02/01/17 186 lb 8 oz (84.596 kg)   02/01/17 189 lb (85.73 kg)   01/20/17 189 lb 13.1 oz (86.1 kg)              We Performed the Following     Allergen alternaria alternata IgE     Allergen rommel white IgE     Allergen aspergillus fumigatus IgE     Allergen cat epithellium IgE     Allergen Cedar IgE     Allergen cladosporium herbarum IgE     Allergen cottonwood IgE     Allergen D farinae IgE     Allergen D pteronyssinus IgE     Allergen dog epithelium IgE     Allergen elm IgE     Allergen English plantain IgE     Allergen Epicoccum purpurascens IgE     Allergen giant ragweed IgE     Allergen lamb's quarter IgE     Allergen maple box elder IgE     Allergen Mugwort IgE     Allergen [place] White     Allergen oak white IgE     Allergen penicillium notatum IgE     Allergen ragweed short IgE     Allergen Red [place] IgE     Allergen Sagebrush Wormwood IgE     Allergen Sheep Sorrel IgE     Allergen silver  birch IgE     Allergen thistle Russian IgE     Allergen pieter IgE     Allergen [place] Tree     Allergen white pine IgE          Today's Medication Changes          These changes are accurate as of: 2/1/17 11:32 AM.  If you have any questions, ask your nurse or doctor.               Start taking these medicines.        Dose/Directions    budesonide 32 MCG/ACT spray   Commonly known as:  RHINOCORT AQUA   Used for:  Chronic rhinitis, Dysfunction of eustachian tube, right   Started by:  Brice Yuan,         Dose:  2 spray   Spray 2 sprays into both nostrils 2 times daily   Quantity:  1 Bottle   Refills:  11       montelukast 10 " MG tablet   Commonly known as:  SINGULAIR   Used for:  ETD (eustachian tube dysfunction), right, Chronic rhinitis   Started by:  Vamshi Wright MD        Dose:  10 mg   Take 1 tablet (10 mg) by mouth At Bedtime   Quantity:  30 tablet   Refills:  3         Stop taking these medicines if you haven't already. Please contact your care team if you have questions.     fluticasone 50 MCG/ACT spray   Commonly known as:  FLONASE   Stopped by:  Brice Yuan, DO                Where to get your medicines      These medications were sent to [place], [place] - [address] [place] [address]  [address] [facility] [address]     Phone:  [phone]    - budesonide 32 MCG/ACT spray  - montelukast 10 MG tablet             Primary Care Provider    None       No address on file        Thank you!     Thank you for choosing [facility]  for your care. Our goal is always to provide you with excellent care. Hearing back from our patients is one way we can continue to improve our services. Please take a few minutes to complete the written survey that you may receive in the mail after your visit with us. Thank you!             Your Updated Medication List - Protect others around you: Learn how to safely use, store and throw away your medicines at www.disposemymeds.org.          This list is accurate as of: 2/1/17 11:32 AM.  Always use your most recent med list.                   Brand Name Dispense Instructions for use    budesonide 32 MCG/ACT spray    RHINOCORT AQUA    1 Bottle    Spray 2 sprays into both nostrils 2 times daily       desogestrel-ethinyl estradiol 0.15-30 MG-MCG per tablet    APRI     Take 1 tablet by mouth daily       metFORMIN 500 MG 24 hr tablet    GLUCOPHAGE-XR    270 tablet    Take 3 tablets (1,500 mg) by mouth daily (with dinner)       montelukast 10 MG tablet    SINGULAIR    30 tablet    Take 1 tablet (10 mg) by mouth At Bedtime

## 2017-02-01 NOTE — PROGRESS NOTES
AUDIOLOGY REPORT    SUMMARY: Audiology visit completed. See audiogram for results.      RECOMMENDATIONS: Follow-up with ENT.    Aquilino Joshua  Audiologist  [place] License  #[id number]

## 2017-02-01 NOTE — PATIENT INSTRUCTIONS
If you have any questions regarding your allergies, asthma, or what we discussed during your visit today please call the allergy clinic or contact us via [employer].    [facility]: [phone]  [facility]: [phone]  [facility]: [phone]    Allergy RN Line ([facility]): [phone]    1. Agree Rhinocort 2 sprays/nostril twice daily. Use spring and fall 2 sprays/nostril daily.   2. Agree with Singulair.   3. Blood testing today.     AEROALLERGEN AVOIDANCE INSTRUCTIONS  MOLD  Indoors, mold season is year round. Outdoors, most mold prefer seasons with high humidity. Mold prefers damp, dark, warm places. Here are some tips on how to avoid mold exposure.  1.  Keep humidity inside between 35-50% with air conditioning or dehumidifier. The humidity level can be checked with a meter from a hardware store.   2.  Clean surfaces where mold grows and dry wet areas.  3.  Avoid steam cleaning carpets and discard moldy belongings.  4.  Wear a mask when doing yard work and refrain from walking through uncut fields or playing in leaves.  5.  Minimize use of potted plants and do not keep them indoors.  6.  Consider an allergy cover for the pillow and mattress.  POLLEN  Pollens are the tiny airborne particles given off by trees, weeds, and grasses. They can be the cause of seasonal allergic rhinitis or hay fever symptoms, which include stuffy, itchy, runny nose, redness, swelling and itching of the eyes, and itching of the ears and throat. Here are some tips on how to avoid pollen exposure.  1. .Keep windows closed and use the air conditioner when possible.  2.  Avoid outside exposure in the early morning as pollen counts are highest at that time.  3.  Take a shower and wash hair each night.  4.  Consider wearing a mask when working in the yard and/or garden.  5.  Clean furnace filter monthly with HEPA filters. Consider a HEPA filter vacuum  which will prevent pollen from  being reintroduced into the air.   DUST MITES  Dust mites can never be entirely eliminated in the house no matter how clean your house is. Dust mites are attracted to warm, moist areas and feed on dead skin flakes. Here are tips to minimize dust mites in your home.  1.  Encase pillows and mattress/box springs in zippered allergy covers.  2.  Wash bedding in hot water (at least 130 F) every 7-14 days.  3.  Avoid curtains, carpet, and upholstered furniture if possible.  4.  Use HEPA air filters and a HEPA filter vacuum . Change filters monthly. Vacuum weekly.  5.  Keep bedroom simple, avoiding clutter, so it can quickly be dusted.  6.  Cover heating vents with vent filters.  7.  Keep stuffed toys in a closed container and wash or freeze regularly.  8.  Keep clothing in the closet with the door closed.  PETS  Pets present many problems for people with allergies. Dander from pets is very difficult to remove and also is a food source for dust mites.  1.  If possible, find the pet a new home.  2.  If not possible, keep the pet outdoors. Never allow the pet into the bedroom.  3.  Wash pet weekly in warm water.  4.  Encase mattresses, pillows, and box springs in allergen-proof covers.  5.  Use HEPA air filters and a HEPA filter vacuum . Change filters monthly.

## 2017-02-01 NOTE — ASSESSMENT & PLAN NOTE
Right ear popping, fullness and pressure. Following with ENT. No huge benefit with oral steroids. Flonase 2 sprays/nostril daily not hugely beneficial.     - Agree with nasal steroid 2 sprays/nostril bid. Switching to Rhinocort from Flonase.   - Agree with montelukast.   - Continue ENT follow up.   - Allergy evaluation as noted.

## 2017-02-01 NOTE — Clinical Note
Allergy evaluation positive for camille and cedar. Not consistent with symptoms. Sending serum evaluation as well. Switched to rhinocort and agree with singulair. Please see note for details. Thanks.   Brice Yuan

## 2017-02-01 NOTE — PROGRESS NOTES
Lucero Ku is a 31 year old White female with previous medical history significant for PCO S, Hashimoto's thyroiditis and eustachian tube dysfunction. Lucero Ku is being seen today for evaluation of seasonal allergies. The patient is being seen in consultation at the request of Dr. Wright.     Starting in November 2016 the patient noticed increased right ear pressure and sensation of fluid behind her ear for 3 days. Subsequently she had increased pain of her right ear and had right ear rupture. Diagnosed with acute otitis media and externa. Treated with antibiotics. She had a bloody/possible discharge.continued to have right-sided pressure and popping sensation on multiple occasions. She additionally developed right-sided nasal congestion and postnasal drip. She had her rhinorrhea on her right side that was occasionally bloody. Seen by ENT today and diagnosed with eustachian tube dysfunction. She previously had been started on Flonase 2 sprays per nostril daily. This was increased by ENT to 2 sprays twice per day. Additionally she was started on montelukast. She previously tried cetirizine for one week and not helpful. Oral corticosteroids were not terribly helpful as well. Patient has a history of historically having spring and fall rhinorrhea, congestion, sneezing, nasal itching and postnasal drip. Symptoms are made worse when she is raking leaves, around hay or cutting grass. No symptoms around dogs or cats. No history of allergy evaluation.    No history of asthma, food allergies, hives.      ENVIRONMENTAL HISTORY: The family lives in a [facility] [place] The home is heated with a forced air. They does have central air conditioning. The patient's bedroom is furnished with feather/wool bedding or pillows, carpeting in bedroom and fabric window coverings.  Pets inside the house include 1 cat(s). There is not history of cockroach or mice infestation. There is/are 0 smokers in the  house.  The house does not have a damp basement.         Past Medical History   Diagnosis Date     Infertility, female      in vitro, tiwn birth     PCOS (polycystic ovarian syndrome)      Family History   Problem Relation Age of Onset     Hypertension Mother      Hypertension Father      CANCER Father 55     Stage IV Kidney ()     Thyroid Disease Father      hypothyroidism     Hypertension Sister      Hypertension Brother      DIABETES Maternal Grandfather      Hypertension Maternal Grandfather      Lipids Maternal Grandfather      Defibrilator     CANCER Paternal Grandmother      Thyroid Disease Paternal Grandmother      Hypertension Paternal Grandmother      CANCER Paternal Grandfather      Thyroid Disease Paternal Grandfather      Hypertension Paternal Grandfather      Past Surgical History   Procedure Laterality Date     Elbow surgery       Right elbow     Hc tooth extraction w/forcep       Hysteroscopy diagnostic  2011      section  3/4/2014     Procedure:  SECTION;;  Surgeon: Paige Ly MD;  Location: [facility]       REVIEW OF SYSTEMS:  General: negative for weight gain. negative for weight loss. negative for changes in sleep.   Ears: positve for fullness. positive  for hearing loss. negative for dizziness.   Nose: positive  for snoring.negative for changes in smell. positive  for drainage.   Eyes: negative for eye watering. negative for eye itching. negative for vision changes. negative for eye redness.  Throat: negative for hoarseness. negative for sore throat. negative for trouble swallowing.   Lungs: negative for shortness of breath.negative for wheezing. positive  for sputum production.   Cardiovascular: negative for chest pain. negative for swelling of ankles. negative for fast or irregular heartbeat.   Gastrointestinal: positive  for nausea. positive  for heartburn. positive  for acid reflux.   Musculoskeletal: negative for joint pain. negative for joint stiffness. negative  for joint swelling.   Neurologic: negative for seizures. negative for fainting. negative for weakness.   Psychiatric: negative for changes in mood. positive  for anxiety.   Endocrine: negative for cold intolerance. negative for heat intolerance. negative for tremors.   Lymphatic: negative for lower extremity swelling. negative for lymph node swelling.   Hematologic: positive  for easy bruising. negative for easy bleeding.  Integumentary: negative for rash. negative for scaling. negative for nail changes.       Current outpatient prescriptions:      budesonide (RHINOCORT AQUA) 32 MCG/ACT spray, Spray 2 sprays into both nostrils 2 times daily, Disp: 1 Bottle, Rfl: 11     metFORMIN (GLUCOPHAGE-XR) 500 MG 24 hr tablet, Take 3 tablets (1,500 mg) by mouth daily (with dinner), Disp: 270 tablet, Rfl: 1     desogestrel-ethinyl estradiol (APRI) 0.15-30 MG-MCG per tablet, Take 1 tablet by mouth daily, Disp: , Rfl:      montelukast (SINGULAIR) 10 MG tablet, Take 1 tablet (10 mg) by mouth At Bedtime, Disp: 30 tablet, Rfl: 3  Immunization History   Administered Date(s) Administered     Influenza Vaccine IM 3yrs+ 4 Valent IIV4 01/14/2014, 11/03/2014, 09/22/2015, 10/11/2016     MMR 01/28/2013     TDAP (ADACEL AGES 11-64) 01/28/2014     Allergies   Allergen Reactions     Hydrocodone-Acetaminophen Nausea and Vomiting     Reaction after taking Vicodin for tooth extraction.  Can take plain Tylenol.         EXAM:   Constitutional:  Appears well-developed and well-nourished. No distress.   HEENT:   Head: Normocephalic.   Right Ear: External ear normal. TM perforation  Left Ear: External ear normal. TM normal  Mouth/Throat: Whitish oropharyngeal exudate present.   Cobblestoning of posterior oropharynx.   Nasal tissue pale.  No rhinorrhea noted.    Eyes: Conjunctivae are non-erythematous   No maxillary or frontal sinus tenderness to palpation.   Cardiovascular: Normal rate, regular rhythm and normal heart sounds. Exam reveals no gallop and  no friction rub.   No murmur heard.  Respiratory: Effort normal and breath sounds normal. No respiratory distress. No wheezes. No rales.   Musculoskeletal: Normal range of motion.   Lymphadenopathy:   No cervical adenopathy.   No lower extremity edema.   Neuro: Oriented to person, place, and time.  Skin: Skin is warm and dry. No rash noted.   Psychiatric: Normal mood and affect.     Nursing note and vitals reviewed.      WORKUP:   Skin testing  Positive for camille and Stephens. All else negative.    ASSESSMENT/PLAN:  Problem List Items Addressed This Visit        Nervous and Auditory    Dysfunction of eustachian tube, right     Right ear popping, fullness and pressure. Following with ENT. No huge benefit with oral steroids. Flonase 2 sprays/nostril daily not hugely beneficial.     - Agree with nasal steroid 2 sprays/nostril bid. Switching to Rhinocort from Flonase.   - Agree with montelukast.   - Continue ENT follow up.   - Allergy evaluation as noted.          Relevant Medications    budesonide (RHINOCORT AQUA) 32 MCG/ACT spray    Other Relevant Orders    Allergen cat epithellium IgE (Completed)    Allergen dog epithelium IgE (Completed)    Allergen pieter IgE (Completed)    Allergen D farinae IgE (Completed)    Allergen D pteronyssinus IgE (Completed)    Allergen Epicoccum purpurascens IgE (Completed)    Allergen penicillium notatum IgE (Completed)    Allergen alternaria alternata IgE (Completed)    Allergen aspergillus fumigatus IgE (Completed)    Allergen cladosporium herbarum IgE (Completed)    Allergen rommel white IgE (Completed)    Allergen Stephens IgE (Completed)    Allergen cottonwood IgE (Completed)    Allergen elm IgE (Completed)    Allergen maple box elder IgE (Completed)    Allergen [place]op White (Completed)    Allergen oak white IgE (Completed)    Allergen Red [place] IgE (Completed)    Allergen silver  birch IgE (Completed)    Allergen [place] Tree (Completed)    Allergen white pine IgE (Completed)     Allergen Sagebrush Wormwood IgE (Completed)    Allergen Sheep Sorrel IgE (Completed)    Allergen thistle Russian IgE (Completed)    Allergen English plantain IgE (Completed)    Allergen giant ragweed IgE (Completed)    Allergen lamb's quarter IgE (Completed)    Allergen Mugwort IgE (Completed)    Allergen ragweed short IgE (Completed)       Respiratory    Chronic rhinitis - Primary     Spring and fall rhinorrhea, congestion, sneezing, nasal itching. No ocular symptoms. Increased symptoms around raking leaves, hay and mowing grass. Also with postnasal drip. Increased right-sided ear pressure and pain over the last 1-2 months. Has had bloody nasal discharge and congestion on the right side. More postnasal drip over the last 1-2 months. Eustachian tube dysfunction. Flonase 2 sprays per nostril daily has been used for one month and not clearly helpful. Prednisone was not clearly helpful. Montelukast was started today. Antihistamine has not been helpful.    Skin testing positive for camille and cedar. All else negative.     - Skin testing results not consistent with symptoms. Will check with serum IgE for environmental allergens given symptoms consistent with mold allergy (raking leaves, hay and mowing grass bothers her).  - Changing from Flonase to Rhinocort 2 sprays/nostril twice daily. Using for the next 1 month of eustachian tube dysfunction. Following with ENT.   - Agree with trial of montelukast.   - Use Rhinocort 2 sprays/nostril daily in the spring and fall.   - Oral antihistamine as needed.          Relevant Medications    budesonide (RHINOCORT AQUA) 32 MCG/ACT spray    Other Relevant Orders    Allergen cat epithellium IgE (Completed)    Allergen dog epithelium IgE (Completed)    Allergen pieter IgE (Completed)    Allergen D farinae IgE (Completed)    Allergen D pteronyssinus IgE (Completed)    Allergen Epicoccum purpurascens IgE (Completed)    Allergen penicillium notatum IgE (Completed)    Allergen alternaria  alternata IgE (Completed)    Allergen aspergillus fumigatus IgE (Completed)    Allergen cladosporium herbarum IgE (Completed)    Allergen rommel white IgE (Completed)    Allergen Pinnacle IgE (Completed)    Allergen cottonwood IgE (Completed)    Allergen elm IgE (Completed)    Allergen maple box elder IgE (Completed)    Allergen Opal White (Completed)    Allergen oak white IgE (Completed)    Allergen Red Opal IgE (Completed)    Allergen silver  birch IgE (Completed)    Allergen [place] Tree (Completed)    Allergen white pine IgE (Completed)    Allergen Sagebrush Wormwood IgE (Completed)    Allergen Sheep Sorrel IgE (Completed)    Allergen thistle Russian IgE (Completed)    Allergen English plantain IgE (Completed)    Allergen giant ragweed IgE (Completed)    Allergen lamb's quarter IgE (Completed)    Allergen Mugwort IgE (Completed)    Allergen ragweed short IgE (Completed)    ALLERGY SKIN TESTS,ALLERGENS (Completed)          Chart documentation with Dragon Voice recognition Software. Although reviewed after completion, some words and grammatical errors may remain.    Brice Yuan DO   Allergy/Immunology  [facility], [place] and [place], [place]

## 2017-02-03 LAB
CALIF WALNUT IGE QN: 0.19
DEPRECATED MISC ALLERGEN IGE RAST QL: NORMAL
WHITE MULBERRY IGE QN: NORMAL

## 2017-02-04 LAB
A ALTERNATA IGE QN: NORMAL KU(A)/L
A FUMIGATUS IGE QN: NORMAL KU(A)/L
C HERBARUM IGE QN: NORMAL KU(A)/L
CAT DANDER IGG QN: NORMAL KU(A)/L
CEDAR IGE QN: NORMAL KU(A)/L
COMMON RAGWEED IGE QN: NORMAL KU(A)/L
COTTONWOOD IGE QN: NORMAL KU(A)/L
D FARINAE IGE QN: NORMAL KU(A)/L
D PTERONYSS IGE QN: NORMAL KU(A)/L
DOG DANDER+EPITH IGE QN: NORMAL KU(A)/L
E PURPURASCENS IGE QN: NORMAL KU(A)/L
EAST WHITE PINE IGE QN: NORMAL KU(A)/L
ENGL PLANTAIN IGE QN: NORMAL KU(A)/L
GIANT RAGWEED IGE QN: NORMAL KU(A)/L
GOOSEFOOT IGE QN: NORMAL KU(A)/L
MAPLE IGE QN: NORMAL KU(A)/L
MUGWORT IGE QN: NORMAL KU(A)/L
P NOTATUM IGE QN: NORMAL KU(A)/L
RED MULBERRY IGE QN: NORMAL KU(A)/L
SALTWORT IGE QN: NORMAL KU(A)/L
SHEEP SORREL IGE QN: NORMAL KU(A)/L
SILVER BIRCH IGE QN: NORMAL KU(A)/L
TIMOTHY IGE QN: NORMAL KU(A)/L
WHITE ASH IGE QN: NORMAL KU(A)/L
WHITE ELM IGE QN: NORMAL KU(A)/L
WHITE OAK IGE QN: NORMAL KU(A)/L
WORMWOOD IGE QN: NORMAL KU(A)/L

## 2017-03-14 ENCOUNTER — TELEPHONE (OUTPATIENT)
Dept: OTOLARYNGOLOGY | Facility: OTHER | Age: 32
End: 2017-03-14

## 2017-03-14 ENCOUNTER — TELEPHONE (OUTPATIENT)
Dept: FAMILY MEDICINE | Facility: OTHER | Age: 32
End: 2017-03-14

## 2017-03-14 ENCOUNTER — OFFICE VISIT (OUTPATIENT)
Dept: FAMILY MEDICINE | Facility: CLINIC | Age: 32
End: 2017-03-14
Payer: COMMERCIAL

## 2017-03-14 VITALS
BODY MASS INDEX: 32.6 KG/M2 | WEIGHT: 194 LBS | DIASTOLIC BLOOD PRESSURE: 70 MMHG | SYSTOLIC BLOOD PRESSURE: 128 MMHG | TEMPERATURE: 98 F | HEART RATE: 80 BPM

## 2017-03-14 DIAGNOSIS — N76.0 VAGINITIS AND VULVOVAGINITIS: ICD-10-CM

## 2017-03-14 DIAGNOSIS — H69.93 DYSFUNCTION OF EUSTACHIAN TUBE, BILATERAL: ICD-10-CM

## 2017-03-14 DIAGNOSIS — H65.06 RECURRENT ACUTE SEROUS OTITIS MEDIA OF BOTH EARS: Primary | ICD-10-CM

## 2017-03-14 PROCEDURE — 99213 OFFICE O/P EST LOW 20 MIN: CPT | Performed by: NURSE PRACTITIONER

## 2017-03-14 RX ORDER — FLUCONAZOLE 150 MG/1
150 TABLET ORAL ONCE
Qty: 2 TABLET | Refills: 0 | Status: SHIPPED | OUTPATIENT
Start: 2017-03-14 | End: 2017-03-14

## 2017-03-14 NOTE — NURSING NOTE
"Chief Complaint   Patient presents with     Ear Problem       Initial There were no vitals taken for this visit. Estimated body mass index is 31.34 kg/(m^2) as calculated from the following:    Height as of 2/1/17: 5' 4.69\" (1.643 m).    Weight as of 2/1/17: 186 lb 8 oz (84.6 kg).  Medication Reconciliation: complete  "

## 2017-03-14 NOTE — TELEPHONE ENCOUNTER
Lucero Ku is a 31 year old female-     NURSING ASSESSMENT:  Description:  Patient has been dealing with bilateral ear pain for over a month. She has been seen in clinic for this and was told to follow up with ENT, which she has but nothing has improved. She has since developed a cough and chest congestion. Denies fevers or chest pain. She does feel more SOB with exertion.   Onset/duration:  1 week +   Precip. factors:  unknown  Improves/worsens symptoms:  No problem   Pain scale- pain with coughing   Last exam/Treatment:  1/11/2017  Allergies:   Allergies   Allergen Reactions     Hydrocodone-Acetaminophen Nausea and Vomiting     Reaction after taking Vicodin for tooth extraction.  Can take plain Tylenol.     NURSING PLAN: Nursing advice to patient advised to follow up in clinc. Scheduled for this afternoon.     RECOMMENDED DISPOSITION:  See in 24 hours - Patient scheduled for this afternoon.   Will comply with recommendation: Yes  If further questions/concerns or if symptoms do not improve, worsen or new symptoms develop, call your PCP or [facility] as soon as possible.      Guideline used: Chest pain, common cold symptoms  Telephone Triage Protocols for Nurses, Fourth Edition, Belkis Preciado RN

## 2017-03-14 NOTE — TELEPHONE ENCOUNTER
Reason for call:  Symptom  Reason for call:  Patient reporting a symptom    Symptom or request: ear infection in both ears    Duration (how long have symptoms been present): since dec    Have you been treated for this before? Yes    Additional comments: pt was seen by  back in feb for this and she now has an ear infection in both ears. She has been getting this treated but it keeps coming back. Should she get a second opinion or what does  recommend. She was seen today and she will be put on medication and the provider stated that they haven't erupted yet but they are bulging. Please advise.     Phone Number patient can be reached at:  Cell number on file:    Telephone Information:   Mobile [phone]       Best Time:  anytime    Can we leave a detailed message on this number:  YES    Call taken on 3/14/2017 at 3:54 PM by Alee Bowman

## 2017-03-14 NOTE — MR AVS SNAPSHOT
After Visit Summary   3/14/2017    Lucero Ku    MRN: [id number]           Patient Information     Date Of Birth          1985        Visit Information        Provider Department      3/14/2017 3:15 PM Rachna Felder APRN [facility]        Today's Diagnoses     Recurrent acute serous otitis media of both ears    -  1    Dysfunction of eustachian tube, bilateral        Vaginitis and vulvovaginitis           Follow-ups after your visit        Your next 10 appointments already scheduled     Apr 21, 2017 10:30 AM CDT   LAB with [facility] ([facility])    [address] [facility] [address] [phone]           Patient must bring picture ID.  Patient should be prepared to give a urine specimen  Please do not eat 10-12 hours before your appointment if you are coming in fasting for labs on lipids, cholesterol, or glucose (sugar).  Pregnant women should follow their Care Team instructions. Water with medications is okay. Do not drink coffee or other fluids.   If you have concerns about taking  your medications, please ask at office or if scheduling via [employer], send a message by clicking on Secure Messaging, Message Your Care Team.            May 05, 2017  3:30 PM CDT   Return Visit with Santiago Escamilla MD   [facility] ([facility])    [address] [place] [address] [phone]              Who to contact     If you have questions or need follow up information about today's clinic visit or your schedule please contact [facility] directly at [phone].  Normal or non-critical lab and imaging results will be communicated to you by MyChart, letter or phone within 4 business days after the clinic has received the results. If you do not hear from us within 7 days, please contact the clinic through MyChart or phone. If you have a critical  or abnormal lab result, we will notify you by phone as soon as possible.  Submit refill requests through [employer] or call your pharmacy and they will forward the refill request to us. Please allow 3 business days for your refill to be completed.          Additional Information About Your Visit        APE Systemshart Information     [employer] gives you secure access to your electronic health record. If you see a primary care provider, you can also send messages to your care team and make appointments. If you have questions, please call your primary care clinic.  If you do not have a primary care provider, please call [phone] and they will assist you.        Care EveryWhere ID     This is your Care EveryWhere ID. This could be used by other organizations to access your [facility] medical records  [id number]        Your Vitals Were     Pulse Temperature BMI (Body Mass Index)             80 98  F (36.7  C) (Temporal) 32.6 kg/m2          Blood Pressure from Last 3 Encounters:   03/14/17 128/70   02/01/17 112/72   01/20/17 141/82    Weight from Last 3 Encounters:   03/14/17 194 lb (88 kg)   02/01/17 186 lb 8 oz (84.6 kg)   02/01/17 189 lb (85.7 kg)              Today, you had the following     No orders found for display         Today's Medication Changes          These changes are accurate as of: 3/14/17  4:57 PM.  If you have any questions, ask your nurse or doctor.               Start taking these medicines.        Dose/Directions    amoxicillin-clavulanate 875-125 MG per tablet   Commonly known as:  AUGMENTIN   Used for:  Recurrent acute serous otitis media of both ears   Started by:  Rachna Felder APRN CNP        Dose:  1 tablet   Take 1 tablet by mouth 2 times daily   Quantity:  20 tablet   Refills:  0       fluconazole 150 MG tablet   Commonly known as:  DIFLUCAN   Used for:  Vaginitis and vulvovaginitis   Started by:  Rachna Felder APRN CNP        Dose:  150 mg   Take 1 tablet (150 mg) by mouth once for 1  dose   Quantity:  2 tablet   Refills:  0            Where to get your medicines      These medications were sent to [facility] - [address]   [address] [place] [address]     Phone:  [phone]     amoxicillin-clavulanate 875-125 MG per tablet    fluconazole 150 MG tablet                Primary Care Provider    None       No address on file        Thank you!     Thank you for choosing [facility]  for your care. Our goal is always to provide you with excellent care. Hearing back from our patients is one way we can continue to improve our services. Please take a few minutes to complete the written survey that you may receive in the mail after your visit with us. Thank you!             Your Updated Medication List - Protect others around you: Learn how to safely use, store and throw away your medicines at www.disposemymeds.org.          This list is accurate as of: 3/14/17  4:57 PM.  Always use your most recent med list.                   Brand Name Dispense Instructions for use    amoxicillin-clavulanate 875-125 MG per tablet    AUGMENTIN    20 tablet    Take 1 tablet by mouth 2 times daily       budesonide 32 MCG/ACT spray    RHINOCORT AQUA    1 Bottle    Spray 2 sprays into both nostrils 2 times daily       desogestrel-ethinyl estradiol 0.15-30 MG-MCG per tablet    APRI     Take 1 tablet by mouth daily       fluconazole 150 MG tablet    DIFLUCAN    2 tablet    Take 1 tablet (150 mg) by mouth once for 1 dose       metFORMIN 500 MG 24 hr tablet    GLUCOPHAGE-XR    270 tablet    Take 3 tablets (1,500 mg) by mouth daily (with dinner)       montelukast 10 MG tablet    SINGULAIR    30 tablet    Take 1 tablet (10 mg) by mouth At Bedtime

## 2017-03-14 NOTE — PROGRESS NOTES
SUBJECTIVE:                                                    Lucero Ku is a 31 year old female who presents to clinic today for the following health issues:        Concern - ongoing ear pain     Onset: ongoing from December    Description:   Ear pain    Intensity: moderate    Progression of Symptoms:  same    Accompanying Signs & Symptoms:  Cough, sinus pressure       Previous history of similar problem:   Seen by multiple providers since December, ENT and allergist with no improvemtn    Precipitating factors:   Worsened by: bending, lifting heavier items, cold air    Alleviating factors:  Improved by: ibuprofen, tylenol       Therapies Tried and outcome: nasal sprays not helping, ABO not helping      The patient returns to clinic today with worsening ear pain. She has been seen by ENT and an allergist. Has been treated for eustachian tube dysfunction. She is coming quite discouraged, since her seems to be no improvement. She continues to have a lot of pressure in the right ear. Recently she developed a cold and now has got bilateral ear pain.    Problem list and histories reviewed & adjusted, as indicated.  Additional history: as documented    BP Readings from Last 3 Encounters:   03/14/17 128/70   02/01/17 112/72   01/20/17 141/82    Wt Readings from Last 3 Encounters:   03/14/17 194 lb (88 kg)   02/01/17 186 lb 8 oz (84.6 kg)   02/01/17 189 lb (85.7 kg)                    Reviewed and updated as needed this visit by clinical staff  Tobacco  Meds  Med Hx  Surg Hx  Fam Hx  Soc Hx      Reviewed and updated as needed this visit by Provider         ROS:  Constitutional, HEENT, cardiovascular, pulmonary, gi and gu systems are negative, except as otherwise noted.    OBJECTIVE:                                                    /70 (BP Location: Right arm, Patient Position: Chair, Cuff Size: Adult Regular)  Pulse 80  Temp 98  F (36.7  C) (Temporal)  Wt 194 lb (88 kg)  BMI 32.6 kg/m2  Body mass  index is 32.6 kg/(m^2).  GENERAL: Well-nourished, well-hydrated. No acute distress  EYES: Eyes grossly normal to inspection, PERRL and conjunctivae and sclerae normal  HENT: Left ear canal is clear, the TM is erythematous, dull, with loss of normal landmarks. Right ear canal is clear, TM is bulging with purulent appearing fluid. Oropharynx unremarkable  NECK: no adenopathy, no asymmetry, masses, or scars and thyroid normal to palpation  RESP: lungs clear to auscultation - no rales, rhonchi or wheezes  CV: regular rates and rhythm, normal S1 S2, no S3 or S4 and no murmur, click or rub         ASSESSMENT/PLAN:                                                      Problem List Items Addressed This Visit     None      Visit Diagnoses     Recurrent acute serous otitis media of both ears    -  Primary    Relevant Medications    amoxicillin-clavulanate (AUGMENTIN) 875-125 MG per tablet    fluconazole (DIFLUCAN) 150 MG tablet    Dysfunction of eustachian tube, bilateral        Vaginitis and vulvovaginitis        Relevant Medications    fluconazole (DIFLUCAN) 150 MG tablet         Augmentin 875 mg b.i.d. for 10 days for treatment of otitis media  Patient requested Diflucan, stating she typically gets a yeast infection following antibiotics  She has a prescription for Flonase, advised to instill 2 sprays in each nostril while lying supine entering the head toward either side in order to facilitate getting the steroid spray in the region of the eustachian tube  Continue Singulair  May take cetirizine for seasonal allergies. This may also help promote drying of the middle ear  Follow-up with ENT for ongoing problem    ESTHER Cabrera [facility]

## 2017-03-15 ENCOUNTER — OFFICE VISIT (OUTPATIENT)
Dept: OTOLARYNGOLOGY | Facility: OTHER | Age: 32
End: 2017-03-15
Payer: COMMERCIAL

## 2017-03-15 VITALS — HEART RATE: 97 BPM | OXYGEN SATURATION: 100 % | RESPIRATION RATE: 20 BRPM

## 2017-03-15 DIAGNOSIS — H66.90 ACUTE OTITIS MEDIA, UNSPECIFIED LATERALITY, UNSPECIFIED OTITIS MEDIA TYPE: ICD-10-CM

## 2017-03-15 DIAGNOSIS — H69.93 DYSFUNCTION OF EUSTACHIAN TUBE, BILATERAL: Primary | ICD-10-CM

## 2017-03-15 PROCEDURE — 99213 OFFICE O/P EST LOW 20 MIN: CPT | Performed by: OTOLARYNGOLOGY

## 2017-03-15 RX ORDER — METHYLPREDNISOLONE 4 MG
TABLET, DOSE PACK ORAL
Qty: 21 TABLET | Refills: 0 | Status: SHIPPED | OUTPATIENT
Start: 2017-03-15 | End: 2017-07-24

## 2017-03-15 NOTE — TELEPHONE ENCOUNTER
Dr Wright had a cancellation at 4 for today in [place], pt is scheduled at 4 at our [facility] location. Thank You Nurys

## 2017-03-15 NOTE — NURSING NOTE
"Chief Complaint   Patient presents with     RECHECK     Recheck recurring otitis       Initial Pulse 97  Resp 20  SpO2 100% Estimated body mass index is 32.6 kg/(m^2) as calculated from the following:    Height as of 2/1/17: 1.643 m (5' 4.69\").    Weight as of 3/14/17: 88 kg (194 lb).  Medication Reconciliation: complete  "

## 2017-03-15 NOTE — PROGRESS NOTES
History of Present Illness - Lucero Ku is a 31 year old female who presents today with a history of recurring ear infections, which has been a problem since 12/1/17. The patient has experienced 2 confirmed episodes of otitis media in the past 6 months, however she does not feel that they ever resolved.  She was seen by a family practise provider yesterday and was told she has a double ear infection.  She feels that her ear drums ruptured yesterday.  She is currently on Augmentin.  She also has a URI right now.  She was seen by Dr. Yuan for allergy testing and it showed allergies to Ceder and Umer.  Noted recent history of otorrhea. She has hx of  prior PE tube placement ear surgery.     She has a history of ear infections and bilateral ear tubes as a child.      Current Medications -   Current Outpatient Prescriptions:      amoxicillin-clavulanate (AUGMENTIN) 875-125 MG per tablet, Take 1 tablet by mouth 2 times daily, Disp: 20 tablet, Rfl: 0     montelukast (SINGULAIR) 10 MG tablet, Take 1 tablet (10 mg) by mouth At Bedtime, Disp: 30 tablet, Rfl: 3     budesonide (RHINOCORT AQUA) 32 MCG/ACT spray, Spray 2 sprays into both nostrils 2 times daily, Disp: 1 Bottle, Rfl: 11     metFORMIN (GLUCOPHAGE-XR) 500 MG 24 hr tablet, Take 3 tablets (1,500 mg) by mouth daily (with dinner), Disp: 270 tablet, Rfl: 1     desogestrel-ethinyl estradiol (APRI) 0.15-30 MG-MCG per tablet, Take 1 tablet by mouth daily, Disp: , Rfl:     Allergies -   Allergies   Allergen Reactions     Hydrocodone-Acetaminophen Nausea and Vomiting     Reaction after taking Vicodin for tooth extraction.  Can take plain Tylenol.       Social History -   Social History     Social History     Marital status:      Spouse name: N/A     Number of children: 2     Years of education: N/A     Occupational History      [employer]     Social History Main Topics     Smoking status: Never Smoker     Smokeless tobacco:  Never Used      Comment: No smokers in home.     Alcohol use Yes      Comment: social     Drug use: No     Sexual activity: Yes     Partners: Male     Birth control/ protection: Pill     Other Topics Concern     Parent/Sibling W/ Cabg, Mi Or Angioplasty Before 65f 55m? No      Service No     Blood Transfusions No     Caffeine Concern No     Occupational Exposure Yes     [employer]     Hobby Hazards No     Sleep Concern No     Stress Concern No     Weight Concern No     Special Diet No     Back Care No     Exercise No     Seat Belt Yes     Social History Narrative     to [facility] and lives in [place].  No smokers in the home.  No concerns about domestic violence.  Has an indoor cat.  Aware of toxoplasmosis precautions.         Family History -   Family History   Problem Relation Age of Onset     Hypertension Mother      Hypertension Father      CANCER Father 55     Stage IV Kidney (9/13)     Thyroid Disease Father      hypothyroidism     DIABETES Maternal Grandfather      Hypertension Maternal Grandfather      Lipids Maternal Grandfather      Defibrilator     CANCER Paternal Grandmother      Thyroid Disease Paternal Grandmother      Hypertension Paternal Grandmother      CANCER Paternal Grandfather      Thyroid Disease Paternal Grandfather      Hypertension Paternal Grandfather      Hypertension Sister      Hypertension Brother        Review of Systems - As per HPI and PMHx, otherwise review system review of the head and neck negative.    Physical Exam  Vitals: Pulse 97  Resp 20  SpO2 100%  BMI= There is no height or weight on file to calculate BMI.    General - The patient is well nourished and well developed, and appears to have good nutritional status. Age-appropriate activity and behavior.    Head and Face - Normocephalic and atraumatic, with no gross asymmetry noted of the contour of the facial features.  The facial nerve is intact, with strong symmetric movements.    Voice and Breathing - The  patient was breathing comfortably without the use of accessory muscles. There was no wheezing, stridor, or stertor.  The patients voice was clear and strong, and had appropriate pitch and quality.    Ears - Bilateral pinna and EACs with normal appearing overlying skin. Tympanic membrane retracted with nomobility on pneumatic otoscopy bilaterally. Bony landmarks of the ossicular chain are normal. The tympanic membranes are thick and erythematous appearance.  Fluid was seen in the  middle ear.     Eyes - Extraocular movements intact.  Sclera were not icteric or injected, conjunctiva were pink and moist.    Mouth - Examination of the oral cavity showed pink, healthy oral mucosa. No lesions or ulcerations noted.  The tongue was mobile and midline, and the dentition were in good condition.      Throat - The walls of the oropharynx were smooth, pink, moist, symmetric, and had no lesions or ulcerations.  The tonsillar pillars and soft palate were symmetric.  The uvula was midline on elevation.    Neck - Normal midline excursion of the laryngotracheal complex during swallowing.  Full range of motion on passive movement.  Palpation of the occipital, submental, submandibular, internal jugular chain, and supraclavicular nodes did not demonstrate any abnormal lymph nodes or masses.  The carotid pulse was palpable bilaterally.  Palpation of the thyroid was soft and smooth, with no nodules or goiter appreciated.  The trachea was mobile and midline.    Nose - External contour is symmetric, no gross deflection or scars.  Nasal mucosa is pink and moist with no abnormal mucus.  The septum was midline and non-obstructive, turbinates of normal size and position.  No polyps, masses, or purulence noted on examination.          A/P - Lucerofaheem Ku is a 31 year old female with Acute otitis media and eustachian tube dysfunction . At this time we need to clear the infection in her ears.  She should continue her antibiotic and I will add  a medrol dose pack, however she is planning on doing IVF in 2 weeks.  So I would recommend that she not take the prednisone unless she delays the IVF treatment or gets the all clear from her reproductive medicine doctor.  She will call and discuss this with them.      We will reevaluate in 6 weeks.  If the infection clears and she is still symptomatic then I would recommend  is for bilateral myringotomy and tubes.  We discussed the risks and benefits of myringotomy tubes.  The risks include but are not limited to early tube extrusion or blockage requiring replacement, risks of continued ear infections, possibility of the need to repair the tympanic membrane for a non-healing myringotomy, and the possibility other more rare complications of tube placement.        Progress note was partially captured by Cinthia Campa MA and reviewed/addended by Dr. Wright for accuracy and content.      Vamshi Wright MD

## 2017-03-15 NOTE — MR AVS SNAPSHOT
After Visit Summary   3/15/2017    Lucero Ku    MRN: [id number]           Patient Information     Date Of Birth          1985        Visit Information        Provider Department      3/15/2017 4:00 PM Vamshi Wright MD [facility]        Today's Diagnoses     Dysfunction of eustachian tube, bilateral    -  1    Acute otitis media, unspecified laterality, unspecified otitis media type           Follow-ups after your visit        Your next 10 appointments already scheduled     Apr 21, 2017 10:30 AM CDT   LAB with NL LAB [facility] ([facility])    [address] [facility] [id number] [phone]           Patient must bring picture ID.  Patient should be prepared to give a urine specimen  Please do not eat 10-12 hours before your appointment if you are coming in fasting for labs on lipids, cholesterol, or glucose (sugar).  Pregnant women should follow their Care Team instructions. Water with medications is okay. Do not drink coffee or other fluids.   If you have concerns about taking  your medications, please ask at office or if scheduling via [employer], send a message by clicking on Secure Messaging, Message Your Care Team.            Apr 26, 2017  1:00 PM CDT   Return Visit with Vamshi Wright MD   [facility] ([facility])    [address] [place] [id number] [phone]            May 05, 2017  3:30 PM CDT   Return Visit with Santiago Escmailla MD   [facility]    [address] [place] [address] [phone]              Who to contact     If you have questions or need follow up information about today's clinic visit or your schedule please contact [facility] directly at [phone].  Normal or non-critical lab and imaging results will be communicated to you by MyChart, letter or  phone within 4 business days after the clinic has received the results. If you do not hear from us within 7 days, please contact the clinic through [employer] or phone. If you have a critical or abnormal lab result, we will notify you by phone as soon as possible.  Submit refill requests through [employer] or call your pharmacy and they will forward the refill request to us. Please allow 3 business days for your refill to be completed.          Additional Information About Your Visit        [employer] Information     [employer] gives you secure access to your electronic health record. If you see a primary care provider, you can also send messages to your care team and make appointments. If you have questions, please call your primary care clinic.  If you do not have a primary care provider, please call [phone] and they will assist you.        Care EveryWhere ID     This is your Care EveryWhere ID. This could be used by other organizations to access your [place] medical records  [id number]        Your Vitals Were     Pulse Respirations Pulse Oximetry             97 20 100%          Blood Pressure from Last 3 Encounters:   03/14/17 128/70   02/01/17 112/72   01/20/17 141/82    Weight from Last 3 Encounters:   03/14/17 88 kg (194 lb)   02/01/17 84.6 kg (186 lb 8 oz)   02/01/17 85.7 kg (189 lb)              Today, you had the following     No orders found for display         Today's Medication Changes          These changes are accurate as of: 3/15/17  5:05 PM.  If you have any questions, ask your nurse or doctor.               Start taking these medicines.        Dose/Directions    methylPREDNISolone 4 MG tablet   Commonly known as:  MEDROL DOSEPAK   Used for:  Dysfunction of eustachian tube, bilateral, Acute otitis media, unspecified laterality, unspecified otitis media type        Follow package instructions   Quantity:  21 tablet   Refills:  0            Where to get your medicines      These medications were sent to  [facility] [place] [place] - [address]  [address] [facility] [address]     Phone:  [phone]     methylPREDNISolone 4 MG tablet                Primary Care Provider    None       No address on file        Thank you!     Thank you for choosing [facility]  for your care. Our goal is always to provide you with excellent care. Hearing back from our patients is one way we can continue to improve our services. Please take a few minutes to complete the written survey that you may receive in the mail after your visit with us. Thank you!             Your Updated Medication List - Protect others around you: Learn how to safely use, store and throw away your medicines at www.disposemymeds.org.          This list is accurate as of: 3/15/17  5:05 PM.  Always use your most recent med list.                   Brand Name Dispense Instructions for use    amoxicillin-clavulanate 875-125 MG per tablet    AUGMENTIN    20 tablet    Take 1 tablet by mouth 2 times daily       budesonide 32 MCG/ACT spray    RHINOCORT AQUA    1 Bottle    Spray 2 sprays into both nostrils 2 times daily       desogestrel-ethinyl estradiol 0.15-30 MG-MCG per tablet    APRI     Take 1 tablet by mouth daily       metFORMIN 500 MG 24 hr tablet    GLUCOPHAGE-XR    270 tablet    Take 3 tablets (1,500 mg) by mouth daily (with dinner)       methylPREDNISolone 4 MG tablet    MEDROL DOSEPAK    21 tablet    Follow package instructions       montelukast 10 MG tablet    SINGULAIR    30 tablet    Take 1 tablet (10 mg) by mouth At Bedtime

## 2017-04-21 DIAGNOSIS — E03.9 ACQUIRED HYPOTHYROIDISM: ICD-10-CM

## 2017-04-21 LAB — TSH SERPL DL<=0.005 MIU/L-ACNC: 2.25 MU/L (ref 0.4–4)

## 2017-04-21 PROCEDURE — 36415 COLL VENOUS BLD VENIPUNCTURE: CPT | Performed by: INTERNAL MEDICINE

## 2017-04-21 PROCEDURE — 83516 IMMUNOASSAY NONANTIBODY: CPT | Performed by: INTERNAL MEDICINE

## 2017-04-21 PROCEDURE — 84443 ASSAY THYROID STIM HORMONE: CPT | Performed by: INTERNAL MEDICINE

## 2017-04-24 DIAGNOSIS — H90.8 MIXED HEARING LOSS: Primary | ICD-10-CM

## 2017-04-25 LAB
TTG IGA SER-ACNC: 1 U/ML
TTG IGG SER-ACNC: NORMAL U/ML

## 2017-04-25 NOTE — PROGRESS NOTES
Thyroid function test TSH was normal.     Celiac screen is still pending.     Santiago Escamilla MD  [id number]  Endocrinology Service

## 2017-04-26 ENCOUNTER — TRANSFERRED RECORDS (OUTPATIENT)
Dept: HEALTH INFORMATION MANAGEMENT | Facility: CLINIC | Age: 32
End: 2017-04-26

## 2017-04-27 DIAGNOSIS — Z32.01 PREGNANCY EXAMINATION OR TEST, POSITIVE RESULT: Primary | ICD-10-CM

## 2017-04-28 DIAGNOSIS — Z32.01 PREGNANCY EXAMINATION OR TEST, POSITIVE RESULT: ICD-10-CM

## 2017-04-28 LAB
B-HCG SERPL-ACNC: 705 IU/L (ref 0–5)
PROGEST SERPL-MCNC: 24.7 NG/ML

## 2017-04-28 PROCEDURE — 84702 CHORIONIC GONADOTROPIN TEST: CPT | Performed by: OBSTETRICS & GYNECOLOGY

## 2017-04-28 PROCEDURE — 36415 COLL VENOUS BLD VENIPUNCTURE: CPT | Performed by: OBSTETRICS & GYNECOLOGY

## 2017-04-28 PROCEDURE — 84144 ASSAY OF PROGESTERONE: CPT | Performed by: OBSTETRICS & GYNECOLOGY

## 2017-05-01 DIAGNOSIS — Z32.01 PREGNANCY EXAMINATION OR TEST, POSITIVE RESULT: ICD-10-CM

## 2017-05-01 LAB
B-HCG SERPL-ACNC: 2232 IU/L (ref 0–5)
PROGEST SERPL-MCNC: 29.6 NG/ML

## 2017-05-01 PROCEDURE — 84144 ASSAY OF PROGESTERONE: CPT | Performed by: OBSTETRICS & GYNECOLOGY

## 2017-05-01 PROCEDURE — 84702 CHORIONIC GONADOTROPIN TEST: CPT | Performed by: OBSTETRICS & GYNECOLOGY

## 2017-05-01 PROCEDURE — 36415 COLL VENOUS BLD VENIPUNCTURE: CPT | Performed by: OBSTETRICS & GYNECOLOGY

## 2017-05-05 ENCOUNTER — OFFICE VISIT (OUTPATIENT)
Dept: ENDOCRINOLOGY | Facility: CLINIC | Age: 32
End: 2017-05-05
Payer: COMMERCIAL

## 2017-05-05 VITALS
DIASTOLIC BLOOD PRESSURE: 76 MMHG | BODY MASS INDEX: 33.09 KG/M2 | WEIGHT: 193.8 LBS | HEART RATE: 95 BPM | SYSTOLIC BLOOD PRESSURE: 135 MMHG | HEIGHT: 64 IN

## 2017-05-05 DIAGNOSIS — E03.9 ACQUIRED HYPOTHYROIDISM: Primary | ICD-10-CM

## 2017-05-05 PROCEDURE — 99213 OFFICE O/P EST LOW 20 MIN: CPT | Performed by: INTERNAL MEDICINE

## 2017-05-05 NOTE — NURSING NOTE
"Lucero Ku's goals for this visit include:   Chief Complaint   Patient presents with     Thyroid Problem       She requests these members of her care team be copied on today's visit information: None      PCP: None    Referring Provider:  No referring provider defined for this encounter.    Chief Complaint   Patient presents with     Thyroid Problem       Initial /76  Pulse 95  Ht 1.619 m (5' 3.75\")  Wt 87.9 kg (193 lb 12.8 oz)  BMI 33.53 kg/m2 Estimated body mass index is 33.53 kg/(m^2) as calculated from the following:    Height as of this encounter: 1.619 m (5' 3.75\").    Weight as of this encounter: 87.9 kg (193 lb 12.8 oz).  Medication Reconciliation: complete    Do you need any medication refills at today's visit? Yes - metformin    Lisa Hugo LPN      "

## 2017-05-05 NOTE — PROGRESS NOTES
Endocrinology Follow up note:     Lucero Ku is a pleasant 31 year old female who presents for a follow up for Hashimotos thyroiditis. Elevated TSH in June 2015.  However, she decided to wait prior to starting treatment.  TSH gradually improved and by January 2016 was normal. At that time, she just stopped breast feeding. She could have had post partum thyroiditis in resolving phase.     Patient was having fatigue and some cold intolerance along with constipation when the TSH was high.  But she was not sure if this was related to her thyroid condition.During the workup TPO antibody was checked and was found to be elevated at 1545.    Lucero has a diagnosis of polycystic ovarian syndrome leading to infertility.  She currently takes metformin.  She had twins have her in vitro fertilization. She had a successful IVF implant about 6 weeks ago and has been doing well.     Her recent thyroid hormone levels for been normal    ENDO THYROID LABS-[facility] Latest Ref Rng & Units 4/21/2017 1/11/2017   TSH 0.40 - 4.00 mU/L 2.25 2.90   T4 FREE 0.76 - 1.46 ng/dL  1.08   THYROGLOBULIN ANTIBODY <40 IU/mL     THYR PEROXIDASE PRICILLA <35 IU/mL       ENDO THYROID LABS-[facility] Latest Ref Rng & Units 11/21/2016   TSH 0.40 - 4.00 mU/L    T4 FREE 0.76 - 1.46 ng/dL    THYROGLOBULIN ANTIBODY <40 IU/mL <20   THYR PEROXIDASE PRICILLA <35 IU/mL 430 (H)       Currently pt notes no dysphagia or neck fullness or pain or discomfort.  No changes recently in hair growth.  No recent changes in BMs.  No changes to temp perception. No racing heart or palpitations.    PMH/ reviewed and includes the following--  Past Medical History:   Diagnosis Date     Infertility, female     in vitro, tiwn birth     PCOS (polycystic ovarian syndrome)        Soc Hx/ reviewed and includes the following--  Social History   Substance Use Topics     Smoking status: Never Smoker     Smokeless tobacco: Never Used      Comment: No smokers in home.     Alcohol use Yes      Comment:  "social       Fam Hx reviewed and includes the following--  Family History     Problem (# of Occurrences) Relation (Name,Age of Onset)    CANCER (3) Father (55): Stage IV Kidney (9/13), Paternal Grandmother, Paternal Grandfather    DIABETES (1) Maternal Grandfather    Hypertension (7) Mother, Father, Maternal Grandfather, Paternal Grandmother, Paternal Grandfather, Sister, Brother    Lipids (1) Maternal Grandfather: Defibrilator    Thyroid Disease (3) Father: hypothyroidism, Paternal Grandmother, Paternal Grandfather            ROS: constitutional/HEENT/repro/endo reviewed completely with pertinents as above.    PE:  Gen: NAD,  /76  Pulse 95  Ht 1.619 m (5' 3.75\")  Wt 87.9 kg (193 lb 12.8 oz)  BMI 33.53 kg/m2  HEENT: eomi, no scleral icteris or proptosis  Neck: s/nt.  Thyroid is palpable but without any discrete nodules.  Lymph: no cervical or supraclvicular LAD  Abd: nondistended  Ext: no tremors on outstretched hand, extremities are warm  Neuro: no resting tremor, normal gait,  nonfocal otherwise  skin: normal temp/turgor/thickness. No rashes on exposed skin.    Data:  as above    (results reviewed/discussed/explained during her visit)    A/P:   Hashimoto's thyroiditis, normal function, currently 6 weeks pregnant after IVF     Clinically she has no symptoms of hypothyroidism.   Her TSH has been normal 2.25 3 weeks ago suggesting appropriate value for gestational age.   We will retest TSH in about 3 weeks then in second trimester and follow closely during pregnancy.   She was made aware of the symptoms of underactive thyroid and asked to contact sooner should she have any symptoms.     Santiago Escamilla MD  [id number]  Endocrinology Service      "

## 2017-05-05 NOTE — MR AVS SNAPSHOT
After Visit Summary   5/5/2017    Lucero Ku    MRN: [id number]           Patient Information     Date Of Birth          1985        Visit Information        Provider Department      5/5/2017 2:30 PM Santiago Escamilla MD [facility]        Today's Diagnoses     Acquired hypothyroidism    -  1      Care Instructions    Continue testing thyroid function test: 3 weeks from now then every 2 months.             Follow-ups after your visit        Follow-up notes from your care team     Return in about 3 months (around 8/5/2017).      Your next 10 appointments already scheduled     May 23, 2017 11:00 AM CDT   [facility] EST[facility]    [address] [facility] [id number] [phone]            May 30, 2017 10:00 AM CDT   LAB with NL LAB [facility] ([facility]    [address] [facility] [address] [phone]           Patient must bring picture ID.  Patient should be prepared to give a urine specimen  Please do not eat 10-12 hours before your appointment if you are coming in fasting for labs on lipids, cholesterol, or glucose (sugar).  Pregnant women should follow their Care Team instructions. Water with medications is okay. Do not drink coffee or other fluids.   If you have concerns about taking  your medications, please ask at office or if scheduling via [employer], send a message by clicking on Secure Messaging, Message Your Care Team.            May 31, 2017  1:30 PM CDT   [facility] with Aquilino Quigley   [facility]    [address] [id number] [phone]            May 31, 2017  1:45 PM CDT   Return Visit with Vamshi Wright MD   [facility]    [address] [phone]    [phone]            Jul 25, 2017 10:00 AM CDT   LAB with NL LAB C   [facility]    [address] [facility] [address] [phone]           Patient must bring picture ID.  Patient should be prepared to give a urine specimen  Please do not eat 10-12 hours before your appointment if you are coming in fasting for labs on lipids, cholesterol, or glucose (sugar).  Pregnant women should follow their Care Team instructions. Water with medications is okay. Do not drink coffee or other fluids.   If you have concerns about taking  your medications, please ask at office or if scheduling via [employer], send a message by clicking on Secure Messaging, Message Your Care Team.            Aug 18, 2017  9:30 AM CDT   Return Visit with Santiago Escamilla MD   [facility]    [address] [place] [id number] [phone]              Future tests that were ordered for you today     Open Standing Orders        Priority Remaining Interval Expires Ordered    TSH Routine 4/4 2 month 5/5/2018 5/5/2017    T4 free Routine 4/4 2 month 5/5/2018 5/5/2017            Who to contact     If you have questions or need follow up information about today's clinic visit or your schedule please contact [facility] directly at [phone].  Normal or non-critical lab and imaging results will be communicated to you by MyChart, letter or phone within 4 business days after the clinic has received the results. If you do not hear from us within 7 days, please contact the clinic through [employer] or phone. If you have a critical or abnormal lab result, we will notify you by phone as soon as possible.  Submit refill requests through [employer] or call your pharmacy and they will forward the refill request to us. Please allow 3 business days for your refill to be completed.          Additional Information About Your  "Visit        MyChart Information     [employer] gives you secure access to your electronic health record. If you see a primary care provider, you can also send messages to your care team and make appointments. If you have questions, please call your primary care clinic.  If you do not have a primary care provider, please call [phone] and they will assist you.      [employer] is an electronic gateway that provides easy, online access to your medical records. With [employer], you can request a clinic appointment, read your test results, renew a prescription or communicate with your care team.     To access your existing account, please contact your [facility] or call [phone] for assistance.        Care EveryWhere ID     This is your Care EveryWhere ID. This could be used by other organizations to access your [place] medical records  [id number]        Your Vitals Were     Pulse Height BMI (Body Mass Index)             95 1.619 m (5' 3.75\") 33.53 kg/m2          Blood Pressure from Last 3 Encounters:   05/05/17 135/76   03/14/17 128/70   02/01/17 112/72    Weight from Last 3 Encounters:   05/05/17 87.9 kg (193 lb 12.8 oz)   03/14/17 88 kg (194 lb)   02/01/17 84.6 kg (186 lb 8 oz)               Primary Care Provider    None       No address on file        Thank you!     Thank you for choosing [facility]  for your care. Our goal is always to provide you with excellent care. Hearing back from our patients is one way we can continue to improve our services. Please take a few minutes to complete the written survey that you may receive in the mail after your visit with us. Thank you!             Your Updated Medication List - Protect others around you: Learn how to safely use, store and throw away your medicines at www.disposemymeds.org.          This list is accurate as of: 5/5/17  3:07 PM.  Always use your most recent med list.                   Brand Name Dispense " Instructions for use    budesonide 32 MCG/ACT spray    RHINOCORT AQUA    1 Bottle    Spray 2 sprays into both nostrils 2 times daily       desogestrel-ethinyl estradiol 0.15-30 MG-MCG per tablet    APRI     Take 1 tablet by mouth daily Reported on 5/5/2017       metFORMIN 500 MG 24 hr tablet    GLUCOPHAGE-XR    270 tablet    Take 3 tablets (1,500 mg) by mouth daily (with dinner)       methylPREDNISolone 4 MG tablet    MEDROL DOSEPAK    21 tablet    Follow package instructions       montelukast 10 MG tablet    SINGULAIR    30 tablet    Take 1 tablet (10 mg) by mouth At Bedtime

## 2017-05-17 ENCOUNTER — TRANSFERRED RECORDS (OUTPATIENT)
Dept: HEALTH INFORMATION MANAGEMENT | Facility: CLINIC | Age: 32
End: 2017-05-17

## 2017-05-17 ENCOUNTER — MEDICAL CORRESPONDENCE (OUTPATIENT)
Dept: HEALTH INFORMATION MANAGEMENT | Facility: CLINIC | Age: 32
End: 2017-05-17

## 2017-05-17 DIAGNOSIS — Z32.01 PREGNANCY EXAMINATION OR TEST, POSITIVE RESULT: Primary | ICD-10-CM

## 2017-05-18 ENCOUNTER — TELEPHONE (OUTPATIENT)
Dept: ENDOCRINOLOGY | Facility: CLINIC | Age: 32
End: 2017-05-18

## 2017-05-18 NOTE — TELEPHONE ENCOUNTER
"Per Dr. Escamilla's 5/2/17 progress note, \"Lucero has a diagnosis of polycystic ovarian syndrome leading to infertility. She currently takes metformin. She had twins have her in vitro fertilization. She had a successful IVF implant about 6 weeks ago and has been doing well.\"    Will send patient question regarding Metformin (see note below) to Dr. Escamilla for review.     Message left for patient letting her know clinic will contact her with Dr. Escamilla's recommendations.      Obdulia Chand RN  Endocrine Care Coordinator  [facility]    "

## 2017-05-18 NOTE — TELEPHONE ENCOUNTER
[facility] Call Center    Phone Message    Name of Caller: Lucero    Phone Number: Home number on file [phone] (home) or Cell number on file:    Telephone Information:   Mobile [phone]       Best time to return call: Any    May a detailed message be left on voicemail: yes    Relation to patient: Self    Reason for Call: Other: Patient would like to be advise on what patient should do as far as taking Metformin. Patient Dr Ye at [facility] states she should discontinue taking the metFORMIN (GLUCOPHAGE-XR) 500 MG 24 hr tablet [[id number]] (Order [id number]) -however during last office visit with Dr. Escamilla states she would benefit from taking metformin and continue to take it. Please advise.      Action Taken: Message routed to:  Adult Clinics: Endocrinology p [phone]

## 2017-05-23 ENCOUNTER — PRENATAL OFFICE VISIT (OUTPATIENT)
Dept: OBGYN | Facility: OTHER | Age: 32
End: 2017-05-23
Payer: COMMERCIAL

## 2017-05-23 VITALS
DIASTOLIC BLOOD PRESSURE: 70 MMHG | HEART RATE: 88 BPM | HEIGHT: 64 IN | WEIGHT: 193.5 LBS | BODY MASS INDEX: 33.03 KG/M2 | SYSTOLIC BLOOD PRESSURE: 134 MMHG

## 2017-05-23 DIAGNOSIS — O30.091 TWIN GESTATION, UNABLE TO DETERMINE NUMBER OF PLACENTA AND NUMBER OF AMNIOTIC SACS IN FIRST TRIMESTER: Primary | ICD-10-CM

## 2017-05-23 DIAGNOSIS — O46.8X1 SUBCHORIONIC BLEED, FIRST TRIMESTER: ICD-10-CM

## 2017-05-23 DIAGNOSIS — Z23 NEED FOR DIPHTHERIA-TETANUS-PERTUSSIS (TDAP) VACCINE: ICD-10-CM

## 2017-05-23 DIAGNOSIS — O41.8X10 SUBCHORIONIC BLEED, FIRST TRIMESTER: ICD-10-CM

## 2017-05-23 DIAGNOSIS — O09.819 PREGNANCY CONCEIVED THROUGH IN VITRO FERTILIZATION: ICD-10-CM

## 2017-05-23 PROCEDURE — 99207 ZZC FIRST OB VISIT: CPT | Performed by: ADVANCED PRACTICE MIDWIFE

## 2017-05-23 RX ORDER — PRENATAL VIT/IRON FUM/FOLIC AC 27MG-0.8MG
1 TABLET ORAL DAILY
COMMUNITY
End: 2019-12-12

## 2017-05-23 NOTE — MR AVS SNAPSHOT
After Visit Summary   5/23/2017    Lucero Ku    MRN: [id number]           Patient Information     Date Of Birth          1985        Visit Information        Provider Department      5/23/2017 11:00 AM Gaby Salgado APRN [facility] [place]        Today's Diagnoses     Twin gestation, unable to determine number of placenta and number of amniotic sacs in first trimester    -  1    Need for diphtheria-tetanus-pertussis (Tdap) vaccine        Subchorionic bleed, first trimester        Pregnancy conceived through in vitro fertilization          Care Instructions    Remedies for nausea and vomiting with pregnancy:    Eat small frequent meals every 2-3 hours if possible.   Avoid food at extremes of temparture and drinks with carbination.  Eat foods that appeal to you, avoiding fats and spicy foods.  Bread, pasta, crackers, potatoes, and rice tend to be tolerated the best.  Don't worry about what you eat in the first 3 months, it is more important that you can eat and keep it down.   Try flat ginger ale.  Ginger is a herbal remedy for nausea and you can use it in any form.  There are ginger tablets you can purchase.  The dose is 500 to 1000 mg a day.   You may also try doxylamine 12.5 mg three times a day which is a sleeping medication along with Vitamin B6 25 mg three times a day.  This combination takes up to a week to work so give it some time.   Doxylamine is sometimes called Unisom and it comes in 25 mg tablets so you will have to break it in half and take half a tablet.   If you begin to vomit more than 5 or 6 times a day and feel that you are unable to keep anything down, call the clinic for an appointment to be seen.  [place] [phone].      Thank for choosing our clinic for your health care needs. We aim to provided you with excellent care. One way that we continue to improve our care is by listening to our patients. Please take a few minutes to complete the written  survey that you may receive in the mail after your visit.     You may reach your care team by calling the following numbers:    [place]- [phone]   For clinic hours at [facility]  please visit the [place] web site [email or web address]    Notification of your lab results:  Normal or non-critical lab and imaging results will be communicated to you by Mychart, letter, or phone within 7 days. If you do not hear from us within 10 days, please contact us through Mychart or phone. If you have a critical or abnormal lab result, we will notify you by phone as soon as possible.      After Hours nurse advice:  [facility]:  [phone]     Medication Refills:  Please contact your pharmacy and they will forward the refill to us. Please allow 3 business days for your refills to be completed.     Secure access to your medical record:  Use Sp[employer]Live (secure email communication and access to your chart) to send your primary care provider a message or make an appointment. Ask someone on your Team how to sign up for SpunLive. To log on to [employer] or for more information in SpunLive please visit the website at [email or web address].      OBGYN Care Team               Janis Keil Day CNM   Clinic hours: Tuesday 8-5 , Thursday 1145-7 and every other Friday 8-5 at [place]   Wednesday 8-5 at [facility] Mart DO  Clinic hours 7-6 Monday at [place]  8-5 Tuesdays at [place]  7-12 Fridays [facility]  1-5 Fridays at [place]    Alee Duncan MD  Clinic hours: [place] Monday and Thursday 8:15-5  [place] and Friday 8-5                                    Follow-ups after your visit        Additional Services     PERINATOLOGY REFERRAL       Your provider has referred you to Perinatology Services (please use [facility] Referral if referring to [facility]).    Please be aware that coverage of these services is subject to the terms and limitations of your health  insurance plan.  Call member services at your health plan with any benefit or coverage questions.      Please bring the following with you to your appointment:    (1) Any X-Rays, CTs or MRIs which have been performed.  Contact the facility where they were done to arrange for  prior to your scheduled appointment.  Any new CT, MRI or other procedures ordered by your specialist must be performed at a [facility] or coordinated by your clinic's referral office.    (2) List of current medications   (3) This referral request   (4) Any documents/labs given to you for this referral                  Follow-up notes from your care team     Return in about 1 month (around 6/23/2017), or if symptoms worsen or fail to improve.      Your next 10 appointments already scheduled     May 31, 2017  8:45 AM CDT   LAB with NL LAB [facility]   [facility]    [address] [address] [phone]           Patient must bring picture ID.  Patient should be prepared to give a urine specimen  Please do not eat 10-12 hours before your appointment if you are coming in fasting for labs on lipids, cholesterol, or glucose (sugar).  Pregnant women should follow their Care Team instructions. Water with medications is okay. Do not drink coffee or other fluids.   If you have concerns about taking  your medications, please ask at office or if scheduling via [employer], send a message by clicking on Secure Messaging, Message Your Care Team.            May 31, 2017  1:30 PM CDT   [facility]    [address]  [place] [address]   [phone]            May 31, 2017  1:45 PM CDT   [facility]   [facility]    [address]  [address]  [place] [address]   [phone]            Jun 22, 2017 11:15 AM CDT    ESTABLISHED PRENATAL with Alee Duncan MD   [facility]    [address] [facility] [address] [phone]            Jul 25, 2017 10:00 AM CDT   LAB with NL LAB [facility] ([facility]    [address] [address] [phone]           Patient must bring picture ID.  Patient should be prepared to give a urine specimen  Please do not eat 10-12 hours before your appointment if you are coming in fasting for labs on lipids, cholesterol, or glucose (sugar).  Pregnant women should follow their Care Team instructions. Water with medications is okay. Do not drink coffee or other fluids.   If you have concerns about taking  your medications, please ask at office or if scheduling via [employer], send a message by clicking on Secure Messaging, Message Your Care Team.            Aug 18, 2017  9:30 AM CDT   Return Visit with Santiago Esc[facility]illa MD   [facility]    [address] [place] [address] [phone]              Future tests that were ordered for you today     Open Future Orders        Priority Expected Expires Ordered    UA without Microscopic Routine  6/23/2017 5/23/2017    Urine Culture Aerobic Bacterial Routine  6/23/2017 5/23/2017    CBC with platelets Routine  6/23/2017 5/23/2017    HIV Antigen Antibody Combo Routine  6/23/2017 5/23/2017    Rubella Antibody IgG Quantitative Routine  6/23/2017 5/23/2017    Hepatitis B surface antigen Routine  6/23/2017 5/23/2017    Anti Treponema Routine  6/23/2017 5/23/2017    ABO/Rh type and screen Routine  6/23/2017 5/23/2017            Who to contact     If you have questions or need follow up information about today's clinic visit or your schedule please contact [facility] directly at [phone].  Normal or non-critical lab and imaging results will be communicated to you  "by [employer] Ballothart, letter or phone within 4 business days after the clinic has received the results. If you do not hear from us within 7 days, please contact the clinic through [employer] or phone. If you have a critical or abnormal lab result, we will notify you by phone as soon as possible.  Submit refill requests through [employer] or call your pharmacy and they will forward the refill request to us. Please allow 3 business days for your refill to be completed.          Additional Information About Your Visit        [employer] Information     [employer] gives you secure access to your electronic health record. If you see a primary care provider, you can also send messages to your care team and make appointments. If you have questions, please call your primary care clinic.  If you do not have a primary care provider, please call [phone] and they will assist you.        Care EveryWhere ID     This is your Care EveryWhere ID. This could be used by other organizations to access your [facility] medical records  [id number]        Your Vitals Were     Pulse Height Last Period BMI (Body Mass Index)          88 5' 3.75\" (1.619 m) 03/26/2017 (Exact Date) 33.48 kg/m2         Blood Pressure from Last 3 Encounters:   05/23/17 134/70   05/05/17 135/76   03/14/17 128/70    Weight from Last 3 Encounters:   05/23/17 193 lb 8 oz (87.8 kg)   05/05/17 193 lb 12.8 oz (87.9 kg)   03/14/17 194 lb (88 kg)              We Performed the Following     PERINATOLOGY REFERRAL          Today's Medication Changes          These changes are accurate as of: 5/23/17 11:59 PM.  If you have any questions, ask your nurse or doctor.               Stop taking these medicines if you haven't already. Please contact your care team if you have questions.     desogestrel-ethinyl estradiol 0.15-30 MG-MCG per tablet   Commonly known as:  APRI   Stopped by:  Gaby Salgado APRN CNM                    Primary Care Provider    None       No address on file        Thank " you!     Thank you for choosing [facility]  for your care. Our goal is always to provide you with excellent care. Hearing back from our patients is one way we can continue to improve our services. Please take a few minutes to complete the written survey that you may receive in the mail after your visit with us. Thank you!             Your Updated Medication List - Protect others around you: Learn how to safely use, store and throw away your medicines at www.disposemymeds.org.          This list is accurate as of: 5/23/17 11:59 PM.  Always use your most recent med list.                   Brand Name Dispense Instructions for use    budesonide 32 MCG/ACT spray    RHINOCORT AQUA    1 Bottle    Spray 2 sprays into both nostrils 2 times daily       ENDOMETRIN 100 MG   Generic drug:  progesterone      Place 100 mg vaginally 2 times daily       metFORMIN 500 MG 24 hr tablet    GLUCOPHAGE-XR    270 tablet    Take 3 tablets (1,500 mg) by mouth daily (with dinner)       methylPREDNISolone 4 MG tablet    MEDROL DOSEPAK    21 tablet    Follow package instructions       montelukast 10 MG tablet    SINGULAIR    30 tablet    Take 1 tablet (10 mg) by mouth At Bedtime       prenatal multivitamin  plus iron 27-0.8 MG Tabs per tablet      Take 1 tablet by mouth daily

## 2017-05-23 NOTE — NURSING NOTE
"Chief Complaint   Patient presents with     Prenatal Care     seen through infertility clinic-spotting since 4 weeks       Initial /70 (BP Location: Left leg, Patient Position: Chair, Cuff Size: Adult Large)  Pulse 88  Ht 5' 3.75\" (1.619 m)  Wt 193 lb 8 oz (87.8 kg)  LMP 03/26/2017 (Exact Date)  BMI 33.48 kg/m2 Estimated body mass index is 33.48 kg/(m^2) as calculated from the following:    Height as of this encounter: 5' 3.75\" (1.619 m).    Weight as of this encounter: 193 lb 8 oz (87.8 kg).  Medication Reconciliation: sobeida Gates CMA      "

## 2017-05-23 NOTE — PROGRESS NOTES
Lucero Ku is a 31 year old  who presents to the clinic for an new ob visit.   Estimated Date of Delivery: 2018 is calculated from Patient's last menstrual period was 2017 (exact date).       She has had bleeding since her LMP.  IVORY diagnosed small SC bleed..   She has had mild nausea. Weigh loss has not occurred.     HISTORY  updated and reviewed  Past Medical History:   Diagnosis Date     Hashimoto's thyroiditis      Infertility, female     in vitro, tiwn birth     PCOS (polycystic ovarian syndrome)      Past Surgical History:   Procedure Laterality Date      SECTION  3/4/2014    Procedure:  SECTION;;  Surgeon: Paige Ly MD;  Location: [facility] L[facility]     ELBOW SURGERY      Right elbow     HC TOOTH EXTRACTION W/FORCEP       HYSTEROSCOPY DIAGNOSTIC  2011     Social History     Social History     Marital status:      Spouse name: N/A     Number of children: 2     Years of education: N/A     Occupational History      [employer]     Social History Main Topics     Smoking status: Never Smoker     Smokeless tobacco: Never Used      Comment: No smokers in home.     Alcohol use Yes      Comment: social     Drug use: No     Sexual activity: Yes     Partners: Male     Birth control/ protection: None     Other Topics Concern     Parent/Sibling W/ Cabg, Mi Or Angioplasty Before 65f 55m? No      Service No     Blood Transfusions No     Caffeine Concern No     Occupational Exposure Yes     Geofusion     Hobby Hazards No     Sleep Concern No     Stress Concern No     Weight Concern No     Special Diet No     Back Care No     Exercise No     Seat Belt Yes     Social History Narrative     to [place] and lives in [place].  No smokers in the home.  No concerns about domestic violence.  Has an indoor cat.  Aware of toxoplasmosis precautions.       Health Maintenance   Topic Date Due     INFLUENZA VACCINE (SYSTEM ASSIGNED)  2017      "PAP Q3 YR  07/15/2019     TETANUS IMMUNIZATION (SYSTEM ASSIGNED)  01/28/2024     Family History   Problem Relation Age of Onset     Hypertension Mother      Hypertension Father      CANCER Father 55     Stage IV Kidney (9/13)     Thyroid Disease Father      hypothyroidism     DIABETES Maternal Grandfather      Hypertension Maternal Grandfather      Lipids Maternal Grandfather      Defibrilator     CANCER Paternal Grandmother      Thyroid Disease Paternal Grandmother      Hypertension Paternal Grandmother      CANCER Paternal Grandfather      Thyroid Disease Paternal Grandfather      Hypertension Paternal Grandfather      Hypertension Sister      Sleep Apnea Sister      Anxiety Disorder Sister      Hypertension Brother      Anxiety Disorder Brother            REVIEW OF SYSTEMS  C: NEGATIVE for fever, chills  I: NEGATIVE for worrisome rashes, moles or lesions  E: NEGATIVE for vision changes   E/M: NEGATIVE for ear, mouth and throat problems  R: NEGATIVE for significant cough or SOB  B: NEGATIVE for masses, tenderness or discharge  CV: NEGATIVE for chest pain, palpitations   GI: NEGATIVE for  abdominal pain, heartburn, or change in bowel habits.  No vomiting to this point but feels more nauseated than previous pregnancy  : NEGATIVE for frequency, dysuria, or hematuria  M: NEGATIVE for significant arthralgias or myalgia  N: NEGATIVE for weakness, dizziness or paresthesias or headache  H: NEGATIVE for bleeding problems  P: NEGATIVE for changes in mood or affect        PHYSICAL EXAM  /70 (BP Location: Left leg, Patient Position: Chair, Cuff Size: Adult Large)  Pulse 88  Ht 5' 3.75\" (1.619 m)  Wt 193 lb 8 oz (87.8 kg)  LMP 03/26/2017 (Exact Date)  BMI 33.48 kg/m2  GENERAL:  Pleasant pregnant female, alert, cooperative  and well groomed.  SKIN:  Warm and dry, without lesions or rashes  HEAD: Symmetrical features.  EYES:  PERRLA.  EARS: Tympanic membranes gray, translucent and intact.  MOUTH:  Buccal mucosa " pink, moist without lesions.  Teeth in good repair.    NECK:  Thyroid without enlargement and nodules.  Lymph nodes not palpable.   LUNGS:  Clear to auscultation.  HEART:  RRR with  out murmur.  ABDOMEN: Soft without masses , tenderness or organomegaly.  No CVA tenderness.  Uterus not palpable at size equal to dates. MUSCULOSKELETAL:  Full range of motion  EXTREMITIES:  No edema. No significant varicosities.    ASSESSMENT:  Intrauterine pregnancy of 8w1d  (O30.091) Twin gestation, unable to determine number of placenta and number of amniotic sacs in first trimester  (primary encounter diagnosis)  Comment:   Plan: UA without Microscopic, Urine Culture Aerobic         Bacterial, CBC with platelets, HIV Antigen         Antibody Combo, Rubella Antibody IgG         Quantitative, Hepatitis B surface antigen, Anti        Treponema, ABO/Rh type and screen, PERINATOLOGY        REFERRAL            (Z23) Need for diphtheria-tetanus-pertussis (Tdap) vaccine  Comment:   Plan:     (O46.8X1,  O41.8X10) Subchorionic bleed, first trimester  Comment:   Plan:     (O09.819) Pregnancy conceived through in vitro fertilization  Comment:   Plan:         PLAN:  Screening tests include nuchal translucency/blood marker testing in the first trimester and quad screening in the second trimester. We discussed that these are screening tests and not diagnostic tests and that false positives and negatives are a distinct possibility.  Accepts  testing MFM referral placed    Instructed on best evidence for: weight gain for her weight and height for pregnancy; healthy diet and foods to avoid; exercise and activity during pregnancy;avoiding exposure to toxoplasmosis; and maintenance of a generally healthy lifestyle.     [facility] [place] [facility]    Reviewed transmission of and avoidance strategies for CMV.    Discussed travel cautions.    She had her parnter  have   not traveled to areas currently infected with zika in the past 6  months and currently have  no plans to travel to an area infected with Zika.   If travel plans change they will inform us.    Continues to see IVORY and also sees cory for her thyroid.  She is still on metrformin and between them they are making the decision to continue or discontinue.  She will also continue the progesterone vaginally through 12 weeks at this point.   Declined CG/CT testing.   Planning repeat       Genetic Screening  At the time of birth, will you be 35 years old or older?  No  Has the patient, baby s father, or anyone in either family had:  Thalassemia (Italian, Greek, Mediterranean, or  background only) and an MCV result less than 80?  No  Neural tube defect such as meningomyelocele, spina bifida or anencephaly? No  Congenital heart defect? No  Down s syndrome?  No  Moreno-Sach s disease (Lutheran, Cajun, German-Nisswa)? No  Sickle cell disease or trait ([place])?  No  Muscular dystrophy?  No  Cystic Fibrosis?  No  Angélica s chorea? No  Mental retardation/autism?  No   If yes, was the person tested for fragile X?  No  Any other inherited genetic or chromosomal disorder? No  Maternal metabolic disorder (e.g. insulin-dependent diabetes, PKU)?  No  A child with birth defects not listed above? No  Recurrent pregnancy loss or a stillbirth?  No  Does the patient or baby s father have any other genetic risks? No  Infection History  Do we have your permission to complete routine prenatal lab tests.  This includes Hepatitis B, HIV? Yes:   Do you feel that you are at high risk for coming in contact with the AIDS virus? No  Have you ever been treated for tuberculosis?  No  Have you ever received the BCG vaccine for tuberculosis? No  Do you live with someone who has tuberculosis? No   Have you ever been exposed to tuberculosis?  No  Do you have genital herpes?  No  Does your partner have genital herpes?  No  Have you had a rash or viral illness since your last period?  No  Have you ever had  Gonorrhea, Chlamydia, Syphilis, venereal warts, trichomoniasis, pelvic inflammatory disease or any other sexually transmitted disease?  No  Have you had chicken pox?  Yes:   Have you been vaccinated against chicken pox?  No  Have you had any other infectious disease?  No

## 2017-05-23 NOTE — PATIENT INSTRUCTIONS
Remedies for nausea and vomiting with pregnancy:    Eat small frequent meals every 2-3 hours if possible.   Avoid food at extremes of temparture and drinks with carbination.  Eat foods that appeal to you, avoiding fats and spicy foods.  Bread, pasta, crackers, potatoes, and rice tend to be tolerated the best.  Don't worry about what you eat in the first 3 months, it is more important that you can eat and keep it down.   Try flat ginger ale.  Ginger is a herbal remedy for nausea and you can use it in any form.  There are ginger tablets you can purchase.  The dose is 500 to 1000 mg a day.   You may also try doxylamine 12.5 mg three times a day which is a sleeping medication along with Vitamin B6 25 mg three times a day.  This combination takes up to a week to work so give it some time.   Doxylamine is sometimes called Unisom and it comes in 25 mg tablets so you will have to break it in half and take half a tablet.   If you begin to vomit more than 5 or 6 times a day and feel that you are unable to keep anything down, call the clinic for an appointment to be seen.  [place] [phone].      Thank for choosing our clinic for your health care needs. We aim to provided you with excellent care. One way that we continue to improve our care is by listening to our patients. Please take a few minutes to complete the written survey that you may receive in the mail after your visit.     You may reach your care team by calling the following numbers:    [place]- [phone]   For clinic hours at [facility]  please visit the [place] web site [email or web address]    Notification of your lab results:  Normal or non-critical lab and imaging results will be communicated to you by Mychart, letter, or phone within 7 days. If you do not hear from us within 10 days, please contact us through Mychart or phone. If you have a critical or abnormal lab result, we will notify you by phone as soon as possible.      After Hours  nurse advice:  [facility] Nurse Advisors:  [phone]     Medication Refills:  Please contact your pharmacy and they will forward the refill to us. Please allow 3 business days for your refills to be completed.     Secure access to your medical record:  Use [employer] (secure email communication and access to your chart) to send your primary care provider a message or make an appointment. Ask someone on your Team how to sign up for [employer]. To log on to [employer] or for more information in [employer] please visit the website at [email or web address].      OBGYN Care Team               Gaby Sims CNM   Clinic hours: Tuesday 8-5 , Thursday 1145-7 and every other Friday 8-5 at [place]   Wednesday 8-5 at Priy[facility] Mart DO  Clinic hours 7-6 Monday at [place]  8-5 Tuesdays at [place]  7-12 Fridays [facility]  1-5 Fridays at [place]    Alee Duncan MD  Clinic hours: [place] Monday and Thursday 8:15-5  [place] and Friday 8-5

## 2017-05-24 DIAGNOSIS — H90.8 MIXED HEARING LOSS: Primary | ICD-10-CM

## 2017-05-24 PROBLEM — O30.091 TWIN GESTATION, UNABLE TO DETERMINE NUMBER OF PLACENTA AND NUMBER OF AMNIOTIC SACS IN FIRST TRIMESTER: Status: ACTIVE | Noted: 2017-05-24

## 2017-05-24 PROBLEM — Z23 NEED FOR DIPHTHERIA-TETANUS-PERTUSSIS (TDAP) VACCINE: Status: ACTIVE | Noted: 2017-05-24

## 2017-05-24 PROBLEM — O09.819 PREGNANCY CONCEIVED THROUGH IN VITRO FERTILIZATION: Status: ACTIVE | Noted: 2017-05-24

## 2017-05-24 PROBLEM — O41.8X10 SUBCHORIONIC BLEED, FIRST TRIMESTER: Status: ACTIVE | Noted: 2017-05-24

## 2017-05-24 PROBLEM — O46.8X1 SUBCHORIONIC BLEED, FIRST TRIMESTER: Status: ACTIVE | Noted: 2017-05-24

## 2017-05-24 ASSESSMENT — PATIENT HEALTH QUESTIONNAIRE - PHQ9: SUM OF ALL RESPONSES TO PHQ QUESTIONS 1-9: 3

## 2017-05-25 NOTE — TELEPHONE ENCOUNTER
I would follow the recs from the reproductive endocrinology as it pertains more to their specialty.     Santiago Escamilla MD  [phone]  Endocrinology Service

## 2017-05-25 NOTE — TELEPHONE ENCOUNTER
Left voicemail for patient to contact our office.     Obdulia Chand RN  Endocrine Care Coordinator  [facility]

## 2017-05-30 NOTE — TELEPHONE ENCOUNTER
Patient left message stating that she was on her way to work and to please send a message if we were unable to make contact. My Chart message sent as well as message left on patient phone.  Lisa Hugo LPN

## 2017-05-31 ENCOUNTER — OFFICE VISIT (OUTPATIENT)
Dept: AUDIOLOGY | Facility: OTHER | Age: 32
End: 2017-05-31
Payer: COMMERCIAL

## 2017-05-31 ENCOUNTER — OFFICE VISIT (OUTPATIENT)
Dept: OTOLARYNGOLOGY | Facility: OTHER | Age: 32
End: 2017-05-31
Payer: COMMERCIAL

## 2017-05-31 VITALS
WEIGHT: 193 LBS | TEMPERATURE: 97.6 F | HEIGHT: 64 IN | HEART RATE: 81 BPM | OXYGEN SATURATION: 99 % | BODY MASS INDEX: 32.95 KG/M2

## 2017-05-31 DIAGNOSIS — H93.293 ABNORMAL AUDITORY PERCEPTION OF BOTH EARS: Primary | ICD-10-CM

## 2017-05-31 DIAGNOSIS — O20.8 SUBCHORIONIC HEMORRHAGE IN FIRST TRIMESTER: ICD-10-CM

## 2017-05-31 DIAGNOSIS — E03.9 ACQUIRED HYPOTHYROIDISM: ICD-10-CM

## 2017-05-31 DIAGNOSIS — O30.001 TWIN GESTATION IN FIRST TRIMESTER, UNSPECIFIED MULTIPLE GESTATION TYPE: ICD-10-CM

## 2017-05-31 DIAGNOSIS — H69.93 DYSFUNCTION OF EUSTACHIAN TUBE, BILATERAL: Primary | ICD-10-CM

## 2017-05-31 DIAGNOSIS — O30.091 TWIN GESTATION, UNABLE TO DETERMINE NUMBER OF PLACENTA AND NUMBER OF AMNIOTIC SACS IN FIRST TRIMESTER: ICD-10-CM

## 2017-05-31 DIAGNOSIS — R30.0 DYSURIA: Primary | ICD-10-CM

## 2017-05-31 DIAGNOSIS — Z32.01 PREGNANCY EXAMINATION OR TEST, POSITIVE RESULT: ICD-10-CM

## 2017-05-31 LAB
ABO + RH BLD: NORMAL
ABO + RH BLD: NORMAL
ALBUMIN UR-MCNC: NEGATIVE MG/DL
APPEARANCE UR: ABNORMAL
BILIRUB UR QL STRIP: NEGATIVE
BLD GP AB SCN SERPL QL: NORMAL
BLOOD BANK CMNT PATIENT-IMP: NORMAL
COLOR UR AUTO: YELLOW
ERYTHROCYTE [DISTWIDTH] IN BLOOD BY AUTOMATED COUNT: 13.5 % (ref 10–15)
GLUCOSE UR STRIP-MCNC: NEGATIVE MG/DL
HBV SURFACE AG SERPL QL IA: NONREACTIVE
HCT VFR BLD AUTO: 37.4 % (ref 35–47)
HGB BLD-MCNC: 12.5 G/DL (ref 11.7–15.7)
HGB UR QL STRIP: ABNORMAL
HIV 1+2 AB+HIV1 P24 AG SERPL QL IA: NORMAL
KETONES UR STRIP-MCNC: NEGATIVE MG/DL
LEUKOCYTE ESTERASE UR QL STRIP: NEGATIVE
MCH RBC QN AUTO: 28.5 PG (ref 26.5–33)
MCHC RBC AUTO-ENTMCNC: 33.4 G/DL (ref 31.5–36.5)
MCV RBC AUTO: 85 FL (ref 78–100)
NITRATE UR QL: NEGATIVE
PH UR STRIP: 7 PH (ref 5–7)
PLATELET # BLD AUTO: 207 10E9/L (ref 150–450)
PROGEST SERPL-MCNC: 29.1 NG/ML
RBC # BLD AUTO: 4.38 10E12/L (ref 3.8–5.2)
SP GR UR STRIP: 1.01 (ref 1–1.03)
SPECIMEN EXP DATE BLD: NORMAL
T4 FREE SERPL-MCNC: 0.98 NG/DL (ref 0.76–1.46)
TSH SERPL DL<=0.05 MIU/L-ACNC: 2.5 MU/L (ref 0.4–4)
URN SPEC COLLECT METH UR: ABNORMAL
UROBILINOGEN UR STRIP-ACNC: 0.2 EU/DL (ref 0.2–1)
WBC # BLD AUTO: 7.2 10E9/L (ref 4–11)

## 2017-05-31 PROCEDURE — 92555 SPEECH THRESHOLD AUDIOMETRY: CPT | Performed by: AUDIOLOGIST

## 2017-05-31 PROCEDURE — 84443 ASSAY THYROID STIM HORMONE: CPT | Performed by: INTERNAL MEDICINE

## 2017-05-31 PROCEDURE — 92567 TYMPANOMETRY: CPT | Performed by: AUDIOLOGIST

## 2017-05-31 PROCEDURE — 86850 RBC ANTIBODY SCREEN: CPT | Performed by: ADVANCED PRACTICE MIDWIFE

## 2017-05-31 PROCEDURE — 99212 OFFICE O/P EST SF 10 MIN: CPT | Performed by: OTOLARYNGOLOGY

## 2017-05-31 PROCEDURE — 81003 URINALYSIS AUTO W/O SCOPE: CPT | Performed by: ADVANCED PRACTICE MIDWIFE

## 2017-05-31 PROCEDURE — 84439 ASSAY OF FREE THYROXINE: CPT | Performed by: INTERNAL MEDICINE

## 2017-05-31 PROCEDURE — 87186 SC STD MICRODIL/AGAR DIL: CPT | Performed by: ADVANCED PRACTICE MIDWIFE

## 2017-05-31 PROCEDURE — 86901 BLOOD TYPING SEROLOGIC RH(D): CPT | Performed by: ADVANCED PRACTICE MIDWIFE

## 2017-05-31 PROCEDURE — 99207 ZZC NO CHARGE LOS: CPT | Performed by: AUDIOLOGIST

## 2017-05-31 PROCEDURE — 85027 COMPLETE CBC AUTOMATED: CPT | Performed by: ADVANCED PRACTICE MIDWIFE

## 2017-05-31 PROCEDURE — 87340 HEPATITIS B SURFACE AG IA: CPT | Performed by: ADVANCED PRACTICE MIDWIFE

## 2017-05-31 PROCEDURE — 86780 TREPONEMA PALLIDUM: CPT | Performed by: ADVANCED PRACTICE MIDWIFE

## 2017-05-31 PROCEDURE — 36415 COLL VENOUS BLD VENIPUNCTURE: CPT | Performed by: ADVANCED PRACTICE MIDWIFE

## 2017-05-31 PROCEDURE — 86900 BLOOD TYPING SEROLOGIC ABO: CPT | Performed by: ADVANCED PRACTICE MIDWIFE

## 2017-05-31 PROCEDURE — 87086 URINE CULTURE/COLONY COUNT: CPT | Performed by: ADVANCED PRACTICE MIDWIFE

## 2017-05-31 PROCEDURE — 87389 HIV-1 AG W/HIV-1&-2 AB AG IA: CPT | Performed by: ADVANCED PRACTICE MIDWIFE

## 2017-05-31 PROCEDURE — 86762 RUBELLA ANTIBODY: CPT | Performed by: ADVANCED PRACTICE MIDWIFE

## 2017-05-31 PROCEDURE — 84144 ASSAY OF PROGESTERONE: CPT | Performed by: INTERNAL MEDICINE

## 2017-05-31 PROCEDURE — 92552 PURE TONE AUDIOMETRY AIR: CPT | Performed by: AUDIOLOGIST

## 2017-05-31 PROCEDURE — 87088 URINE BACTERIA CULTURE: CPT | Performed by: ADVANCED PRACTICE MIDWIFE

## 2017-05-31 ASSESSMENT — PAIN SCALES - GENERAL: PAINLEVEL: NO PAIN (0)

## 2017-05-31 NOTE — PROGRESS NOTES
History of Present Illness - Lucero Ku is a 31 year old female presenting in clinic today for a recheck on Patient presents with:  RECHECK: Ears plugged. Ear infection. Possible tubes today.        She is still having the ears plugging and rupturing.  She feels that after they rupture she feels better.  Her ears do pop when she sneezes or coughs.    Lucero has a history of Myringotomy and Tubes surgery.        Past Medical History -   Past Medical History:   Diagnosis Date     Hashimoto's thyroiditis      Infertility, female     in vitro, tiwn birth     PCOS (polycystic ovarian syndrome)        Current Medications -   Current Outpatient Prescriptions:      Prenatal Vit-Fe Fumarate-FA (PRENATAL MULTIVITAMIN  PLUS IRON) 27-0.8 MG TABS per tablet, Take 1 tablet by mouth daily, Disp: , Rfl:      progesterone (ENDOMETRIN) 100 MG, Place 100 mg vaginally 2 times daily, Disp: , Rfl:      methylPREDNISolone (MEDROL DOSEPAK) 4 MG tablet, Follow package instructions, Disp: 21 tablet, Rfl: 0     montelukast (SINGULAIR) 10 MG tablet, Take 1 tablet (10 mg) by mouth At Bedtime, Disp: 30 tablet, Rfl: 3     budesonide (RHINOCORT AQUA) 32 MCG/ACT spray, Spray 2 sprays into both nostrils 2 times daily, Disp: 1 Bottle, Rfl: 11    Allergies -   Allergies   Allergen Reactions     Hydrocodone-Acetaminophen Nausea and Vomiting     Reaction after taking Vicodin for tooth extraction.  Can take plain Tylenol.       Social History -   Social History     Social History     Marital status:      Spouse name: N/A     Number of children: 2     Years of education: N/A     Occupational History      [employer]     Social History Main Topics     Smoking status: Never Smoker     Smokeless tobacco: Never Used      Comment: No smokers in home.     Alcohol use Yes      Comment: social     Drug use: No     Sexual activity: Yes     Partners: Male     Birth control/ protection: None     Other Topics Concern      "Parent/Sibling W/ Cabg, Mi Or Angioplasty Before 65f 55m? No      Service No     Blood Transfusions No     Caffeine Concern No     Occupational Exposure Yes     [employer]     Hobby Hazards No     Sleep Concern No     Stress Concern No     Weight Concern No     Special Diet No     Back Care No     Exercise No     Seat Belt Yes     Social History Narrative     to [place] and lives in [place].  No smokers in the home.  No concerns about domestic violence.  Has an indoor cat.  Aware of toxoplasmosis precautions.         Family History -   Family History   Problem Relation Age of Onset     Hypertension Mother      Hypertension Father      CANCER Father 55     Stage IV Kidney (9/13)     Thyroid Disease Father      hypothyroidism     DIABETES Maternal Grandfather      Hypertension Maternal Grandfather      Lipids Maternal Grandfather      Defibrilator     CANCER Paternal Grandmother      Thyroid Disease Paternal Grandmother      Hypertension Paternal Grandmother      CANCER Paternal Grandfather      Thyroid Disease Paternal Grandfather      Hypertension Paternal Grandfather      Hypertension Sister      Sleep Apnea Sister      Anxiety Disorder Sister      Hypertension Brother      Anxiety Disorder Brother        Review of Systems - As per HPI and PMHx, otherwise review of system review of the head and neck negative.    Physical Exam  Pulse 81  Temp 97.6  F (36.4  C) (Temporal)  Ht 1.619 m (5' 3.75\")  Wt 87.5 kg (193 lb)  LMP 03/26/2017 (Exact Date)  SpO2 99%  BMI 33.39 kg/m2  BMI: Body mass index is 33.39 kg/(m^2).    General - The patient is well nourished and well developed, and appears to have good nutritional status.  Alert and oriented to person and place, answers questions and cooperates with examination appropriately.    SKIN - No suspicious lesions or rashes.  Respiration - No respiratory distress.     Head and Face - Normocephalic and atraumatic, with no gross asymmetry noted of the contour of " the facial features.  The facial nerve is intact, with strong symmetric movements.    Voice and Breathing - The patient was breathing comfortably without the use of accessory muscles. There was no wheezing, stridor, or stertor.  The patients voice was clear and strong, and had appropriate pitch and quality.    Ears - Bilateral pinna and EACs with normal appearing overlying skin. Tympanic membrane bilaterally are scarred and dificult to see thru. Bony landmarks of the ossicular chain are normal.  No perforation or masses.  No fluid or purulence was seen in the external canal or the middle ear.     Eyes - Extraocular movements intact.  Sclera were not icteric or injected, conjunctiva were pink and moist.    Mouth - Examination of the oral cavity showed pink, healthy oral mucosa. No lesions or ulcerations noted.  The tongue was mobile and midline, and the dentition were in good condition.      Throat - The walls of the oropharynx were smooth, pink, moist, symmetric, and had no lesions or ulcerations.  The tonsillar pillars and soft palate were symmetric.  Tonsils are 2 +.  The uvula was midline on elevation.    Neck - Normal midline excursion of the laryngotracheal complex during swallowing.  Full range of motion on passive movement.  Palpation of the occipital, submental, submandibular, internal jugular chain, and supraclavicular nodes did not demonstrate any abnormal lymph nodes or masses.  The carotid pulse was palpable bilaterally.  Palpation of the thyroid was soft and smooth, with no nodules or goiter appreciated.  The trachea was mobile and midline.    Nose - External contour is symmetric, no gross deflection or scars.  Nasal mucosa is pink and moist with no abnormal mucus.  The septum was midline and non-obstructive, turbinates of normal size and position.  No polyps, masses, or purulence noted on examination.    Neuro - Nonfocal neuro exam is normal, CN 2 through 12 intact, normal gait and muscle  tone.      Performed in clinic today:  No procedures preformed in clinic today.          A/P - Lucero Ku is a 31 year old female Patient presents with:  RECHECK: Ears plugged. Ear infection. Possible tubes today.        We discussed having tubes placed.  She is currently pregnant.  At this time we use Phenol in clinic to place tubes and since this is a toxic substance I would prefer discussing this with her OB/   It appears that Phenol is counterindicated in pregnancy so will need to order tetracaine and then schedule BMT in 2 weeks.      Progress note was partially captured by Cinthia Campa MA and reviewed/addended by Dr. Wright for accuracy and content.      Vamshi Wright MD

## 2017-05-31 NOTE — PROGRESS NOTES
AUDIOLOGY REPORT    SUMMARY: Audiology visit completed. See audiogram for results.    RECOMMENDATIONS: Follow-up with ENT.    Mireya Fabian  Doctor of Audiology  [place] License # [id number]

## 2017-05-31 NOTE — MR AVS SNAPSHOT
After Visit Summary   5/31/2017    Lucero Ku    MRN: [id number]           Patient Information     Date Of Birth          1985        Visit Information        Provider Department      5/31/2017 1:30 PM Marco Gaitan, [facility]        Today's Diagnoses     Abnormal auditory perception of both ears    -  1       Follow-ups after your visit        Your next 10 appointments already scheduled     May 31, 2017  1:45 PM CDT   PROCEDURE with Vamshi Wright MD, NL PROC ROOM, [facility] ([facility])    [address]  [facility] [address]   [phone]            Jun 22, 2017 11:15 AM CDT   ESTABLISHED PRENATAL with Alee Duncan MD   [facility] ([facility]    [address] [address]   [phone]            Jul 25, 2017 10:00 AM CDT   LAB with NL LAB [facility] ([facility])    [address] [facility] [place] [address] [phone]           Patient must bring picture ID.  Patient should be prepared to give a urine specimen  Please do not eat 10-12 hours before your appointment if you are coming in fasting for labs on lipids, cholesterol, or glucose (sugar).  Pregnant women should follow their Care Team instructions. Water with medications is okay. Do not drink coffee or other fluids.   If you have concerns about taking  your medications, please ask at office or if scheduling via [employer], send a message by clicking on Secure Messaging, Message Your Care Team.            Aug 18, 2017  9:30 AM CDT   Return Visit with Santiago Escamilla MD   [facility] ([facility]    [address] [place] [address] [phone]              Who to contact     If you have questions or need follow up information about today's clinic visit or your schedule please contact [place]  [facility] directly at [phone].  Normal or non-critical lab and imaging results will be communicated to you by [employer], letter or phone within 4 business days after the clinic has received the results. If you do not hear from us within 7 days, please contact the clinic through [employer] or phone. If you have a critical or abnormal lab result, we will notify you by phone as soon as possible.  Submit refill requests through [employer] or call your pharmacy and they will forward the refill request to us. Please allow 3 business days for your refill to be completed.          Additional Information About Your Visit        [employer] Information     [employer] gives you secure access to your electronic health record. If you see a primary care provider, you can also send messages to your care team and make appointments. If you have questions, please call your primary care clinic.  If you do not have a primary care provider, please call [phone] and they will assist you.        Care EveryWhere ID     This is your Care EveryWhere ID. This could be used by other organizations to access your [place] medical records  [id number]        Your Vitals Were     Last Period                   03/26/2017 (Exact Date)            Blood Pressure from Last 3 Encounters:   05/23/17 134/70   05/05/17 135/76   03/14/17 128/70    Weight from Last 3 Encounters:   05/23/17 193 lb 8 oz (87.8 kg)   05/05/17 193 lb 12.8 oz (87.9 kg)   03/14/17 194 lb (88 kg)              We Performed the Following     AUDIOGRAM/TYMPANOGRAM - INTERFACE     AUDIOM THRESHOLD     PURE TONE AUDIOMETRY, AIR     TYMPANOMETRY        Primary Care Provider    None       No address on file        Thank you!     Thank you for choosing [facility]  for your care. Our goal is always to provide you with excellent care. Hearing back from our patients is one way we can continue to improve our services. Please take a few minutes to complete the written survey  that you may receive in the mail after your visit with us. Thank you!             Your Updated Medication List - Protect others around you: Learn how to safely use, store and throw away your medicines at www.disposemymeds.org.          This list is accurate as of: 5/31/17  1:42 PM.  Always use your most recent med list.                   Brand Name Dispense Instructions for use    budesonide 32 MCG/ACT spray    RHINOCORT AQUA    1 Bottle    Spray 2 sprays into both nostrils 2 times daily       ENDOMETRIN 100 MG   Generic drug:  progesterone      Place 100 mg vaginally 2 times daily       metFORMIN 500 MG 24 hr tablet    GLUCOPHAGE-XR    270 tablet    Take 3 tablets (1,500 mg) by mouth daily (with dinner)       methylPREDNISolone 4 MG tablet    MEDROL DOSEPAK    21 tablet    Follow package instructions       montelukast 10 MG tablet    SINGULAIR    30 tablet    Take 1 tablet (10 mg) by mouth At Bedtime       prenatal multivitamin  plus iron 27-0.8 MG Tabs per tablet      Take 1 tablet by mouth daily

## 2017-05-31 NOTE — NURSING NOTE
"Chief Complaint   Patient presents with     RECHECK     Ears plugged. Ear infection. Possible tubes today.       Initial Pulse 81  Temp 97.6  F (36.4  C) (Temporal)  Ht 1.619 m (5' 3.75\")  Wt 87.5 kg (193 lb)  LMP 03/26/2017 (Exact Date)  SpO2 99%  BMI 33.39 kg/m2 Estimated body mass index is 33.39 kg/(m^2) as calculated from the following:    Height as of this encounter: 1.619 m (5' 3.75\").    Weight as of this encounter: 87.5 kg (193 lb).  Medication Reconciliation: complete       Jazmin Meadows CMA (AAMA)      "

## 2017-05-31 NOTE — MR AVS SNAPSHOT
After Visit Summary   5/31/2017    Lucero Ku    MRN: [id number]           Patient Information     Date Of Birth          1985        Visit Information        Provider Department      5/31/2017 1:45 PM Vamshi Wright MD; NL PROC [facility]         Follow-ups after your visit        Your next 10 appointments already scheduled     Jun 14, 2017 11:00 AM CDT   Return Visit with Vamshi Wright MD   [facility]    [address]  [facility] [address] [phone]            Jun 22, 2017 11:15 AM CDT   ESTABLISHED PRENATAL with Alee Duncan MD   [facility] ([facility])    [address] [id number] [phone]            Jul 25, 2017 10:00 AM CDT   LAB with NL LAB [facility] [place] [facility] ([facility])    [address] [facility] [address] [phone]           Patient must bring picture ID.  Patient should be prepared to give a urine specimen  Please do not eat 10-12 hours before your appointment if you are coming in fasting for labs on lipids, cholesterol, or glucose (sugar).  Pregnant women should follow their Care Team instructions. Water with medications is okay. Do not drink coffee or other fluids.   If you have concerns about taking  your medications, please ask at office or if scheduling via [employer], send a message by clicking on Secure Messaging, Message Your Care Team.            Aug 18, 2017  9:30 AM CDT   Return Visit with Santiago Escamilla MD   [facility]    [address] [place] [address] [phone]              Who to contact     If you have questions or need follow up information about today's clinic visit or your schedule please contact [facility] directly at [phone].  Normal or non-critical lab and  "imaging results will be communicated to you by MyChart, letter or phone within 4 business days after the clinic has received the results. If you do not hear from us within 7 days, please contact the clinic through [employer] or phone. If you have a critical or abnormal lab result, we will notify you by phone as soon as possible.  Submit refill requests through [employer] or call your pharmacy and they will forward the refill request to us. Please allow 3 business days for your refill to be completed.          Additional Information About Your Visit        [employer] Information     [employer] gives you secure access to your electronic health record. If you see a primary care provider, you can also send messages to your care team and make appointments. If you have questions, please call your primary care clinic.  If you do not have a primary care provider, please call [phone] and they will assist you.        Care EveryWhere ID     This is your Care EveryWhere ID. This could be used by other organizations to access your [place] medical records  [id number]        Your Vitals Were     Pulse Temperature Height Last Period Pulse Oximetry BMI (Body Mass Index)    81 97.6  F (36.4  C) (Temporal) 1.619 m (5' 3.75\") 03/26/2017 (Exact Date) 99% 33.39 kg/m2       Blood Pressure from Last 3 Encounters:   05/23/17 134/70   05/05/17 135/76   03/14/17 128/70    Weight from Last 3 Encounters:   05/31/17 87.5 kg (193 lb)   05/23/17 87.8 kg (193 lb 8 oz)   05/05/17 87.9 kg (193 lb 12.8 oz)              Today, you had the following     No orders found for display       Primary Care Provider    None       No address on file        Thank you!     Thank you for choosing [facility]  for your care. Our goal is always to provide you with excellent care. Hearing back from our patients is one way we can continue to improve our services. Please take a few minutes to complete the written survey that you may receive in the mail after " your visit with us. Thank you!             Your Updated Medication List - Protect others around you: Learn how to safely use, store and throw away your medicines at www.disposemymeds.org.          This list is accurate as of: 5/31/17  4:51 PM.  Always use your most recent med list.                   Brand Name Dispense Instructions for use    budesonide 32 MCG/ACT spray    RHINOCORT AQUA    1 Bottle    Spray 2 sprays into both nostrils 2 times daily       ENDOMETRIN 100 MG   Generic drug:  progesterone      Place 100 mg vaginally 2 times daily       methylPREDNISolone 4 MG tablet    MEDROL DOSEPAK    21 tablet    Follow package instructions       montelukast 10 MG tablet    SINGULAIR    30 tablet    Take 1 tablet (10 mg) by mouth At Bedtime       prenatal multivitamin  plus iron 27-0.8 MG Tabs per tablet      Take 1 tablet by mouth daily

## 2017-06-01 PROBLEM — Z28.39 RUBELLA NON-IMMUNE STATUS, ANTEPARTUM: Status: ACTIVE | Noted: 2017-06-01

## 2017-06-01 PROBLEM — O09.899 RUBELLA NON-IMMUNE STATUS, ANTEPARTUM: Status: ACTIVE | Noted: 2017-06-01

## 2017-06-01 LAB
BACTERIA SPEC CULT: ABNORMAL
MICRO REPORT STATUS: ABNORMAL
MICROORGANISM SPEC CULT: ABNORMAL
RUBV IGG SERPL IA-ACNC: 7 IU/ML
SPECIMEN SOURCE: ABNORMAL
T PALLIDUM IGG+IGM SER QL: NEGATIVE

## 2017-06-02 ENCOUNTER — MYC MEDICAL ADVICE (OUTPATIENT)
Dept: OBGYN | Facility: OTHER | Age: 32
End: 2017-06-02

## 2017-06-02 DIAGNOSIS — B37.31 YEAST INFECTION OF THE VAGINA: Primary | ICD-10-CM

## 2017-06-02 RX ORDER — AMPICILLIN TRIHYDRATE 500 MG
500 CAPSULE ORAL 4 TIMES DAILY
Qty: 20 CAPSULE | Refills: 0 | Status: SHIPPED | OUTPATIENT
Start: 2017-06-02 | End: 2017-06-07

## 2017-06-05 ENCOUNTER — RADIANT APPOINTMENT (OUTPATIENT)
Dept: ULTRASOUND IMAGING | Facility: OTHER | Age: 32
End: 2017-06-05
Attending: ADVANCED PRACTICE MIDWIFE
Payer: COMMERCIAL

## 2017-06-05 DIAGNOSIS — O20.8 SUBCHORIONIC HEMORRHAGE IN FIRST TRIMESTER: ICD-10-CM

## 2017-06-05 DIAGNOSIS — O30.001 TWIN GESTATION IN FIRST TRIMESTER, UNSPECIFIED MULTIPLE GESTATION TYPE: ICD-10-CM

## 2017-06-05 PROCEDURE — 76801 OB US < 14 WKS SINGLE FETUS: CPT

## 2017-06-05 PROCEDURE — 76802 OB US < 14 WKS ADDL FETUS: CPT

## 2017-06-06 RX ORDER — TERCONAZOLE 0.4 %
1 CREAM WITH APPLICATOR VAGINAL AT BEDTIME
Qty: 45 G | Refills: 0 | Status: SHIPPED | OUTPATIENT
Start: 2017-06-06 | End: 2017-06-13

## 2017-06-07 ENCOUNTER — TELEPHONE (OUTPATIENT)
Dept: ENDOCRINOLOGY | Facility: CLINIC | Age: 32
End: 2017-06-07

## 2017-06-07 DIAGNOSIS — E03.9 ACQUIRED HYPOTHYROIDISM: Primary | ICD-10-CM

## 2017-06-07 NOTE — PROGRESS NOTES
Dear Lucero,     The thyroid function test results show normal TSH for the early gestation. Although you have antibodies, at this time, the thyroid function appropriate. We will continue to monitor this every month for the next few months. This has been ordered for you. You can go in at the end of each month for the next 3 months.     Santiago Escamilla MD  [id number]  Endocrinology Service

## 2017-06-13 ENCOUNTER — TELEPHONE (OUTPATIENT)
Dept: FAMILY MEDICINE | Facility: CLINIC | Age: 32
End: 2017-06-13

## 2017-06-13 NOTE — TELEPHONE ENCOUNTER
PT called and wanted to know if the medication that she is suppose to have for appt on 6/14/17. She stated she is having tubes and she is pregnant  and needs the safe medication to do so . Please call and advice if medication was received

## 2017-06-14 ENCOUNTER — OFFICE VISIT (OUTPATIENT)
Dept: OTOLARYNGOLOGY | Facility: OTHER | Age: 32
End: 2017-06-14
Payer: COMMERCIAL

## 2017-06-14 VITALS — WEIGHT: 192 LBS | HEART RATE: 99 BPM | BODY MASS INDEX: 32.78 KG/M2 | HEIGHT: 64 IN | OXYGEN SATURATION: 99 %

## 2017-06-14 DIAGNOSIS — H69.93 DYSFUNCTION OF EUSTACHIAN TUBE, BILATERAL: Primary | ICD-10-CM

## 2017-06-14 PROCEDURE — 69433 CREATE EARDRUM OPENING: CPT | Mod: 50 | Performed by: OTOLARYNGOLOGY

## 2017-06-14 PROCEDURE — 99207 ZZC DROP WITH A PROCEDURE: CPT | Performed by: OTOLARYNGOLOGY

## 2017-06-14 NOTE — MR AVS SNAPSHOT
After Visit Summary   6/14/2017    Lucero Ku    MRN: [id number]           Patient Information     Date Of Birth          1985        Visit Information        Provider Department      6/14/2017 11:00 AM Vamshi Wright MD [facility]         Follow-ups after your visit        Your next 10 appointments already scheduled     Jun 22, 2017 11:15 AM CDT   ESTABLISHED PRENATAL with Alee Duncan MD   [facility]    [address] [facility] [address]   [phone]            Jul 24, 2017  1:30 PM CDT   Return Visit with Vamshi Wright MD   [facility]    [address] [facility] [address] [phone]            Jul 25, 2017 10:00 AM CDT   LAB with NL LAB [facility] ([facility])    [address] [facility] [address] [phone]           Patient must bring picture ID.  Patient should be prepared to give a urine specimen  Please do not eat 10-12 hours before your appointment if you are coming in fasting for labs on lipids, cholesterol, or glucose (sugar).  Pregnant women should follow their Care Team instructions. Water with medications is okay. Do not drink coffee or other fluids.   If you have concerns about taking  your medications, please ask at office or if scheduling via [employer]t, send a message by clicking on Secure Messaging, Message Your Care Team.            Aug 18, 2017  9:30 AM CDT   Return Visit with Santiago Escamilla MD   [facility]    [address] [place] [address] [phone]              Who to contact     If you have questions or need follow up information about today's clinic visit or your schedule please contact [facility] directly at [phone].  Normal or non-critical lab and imaging results will be  "communicated to you by MyChart, letter or phone within 4 business days after the clinic has received the results. If you do not hear from us within 7 days, please contact the clinic through [employer] or phone. If you have a critical or abnormal lab result, we will notify you by phone as soon as possible.  Submit refill requests through [employer] or call your pharmacy and they will forward the refill request to us. Please allow 3 business days for your refill to be completed.          Additional Information About Your Visit        [employer] Information     [employer] gives you secure access to your electronic health record. If you see a primary care provider, you can also send messages to your care team and make appointments. If you have questions, please call your primary care clinic.  If you do not have a primary care provider, please call [phone] and they will assist you.        Care EveryWhere ID     This is your Care EveryWhere ID. This could be used by other organizations to access your [place] medical records  [id number]        Your Vitals Were     Pulse Height Last Period Pulse Oximetry BMI (Body Mass Index)       99 1.619 m (5' 3.75\") 03/26/2017 (Exact Date) 99% 33.22 kg/m2        Blood Pressure from Last 3 Encounters:   05/23/17 134/70   05/05/17 135/76   03/14/17 128/70    Weight from Last 3 Encounters:   06/14/17 87.1 kg (192 lb)   05/31/17 87.5 kg (193 lb)   05/23/17 87.8 kg (193 lb 8 oz)              Today, you had the following     No orders found for display       Primary Care Provider Office Phone # Fax #    ESTHER [facility] [phone]        [place]  [place] [address]        Thank you!     Thank you for choosing [facility]  for your care. Our goal is always to provide you with excellent care. Hearing back from our patients is one way we can continue to improve our services. Please take a few minutes to complete the written " survey that you may receive in the mail after your visit with us. Thank you!             Your Updated Medication List - Protect others around you: Learn how to safely use, store and throw away your medicines at www.disposemymeds.org.          This list is accurate as of: 6/14/17  3:00 PM.  Always use your most recent med list.                   Brand Name Dispense Instructions for use    budesonide 32 MCG/ACT spray    RHINOCORT AQUA    1 Bottle    Spray 2 sprays into both nostrils 2 times daily       ENDOMETRIN 100 MG   Generic drug:  progesterone      Place 100 mg vaginally 2 times daily       methylPREDNISolone 4 MG tablet    MEDROL DOSEPAK    21 tablet    Follow package instructions       montelukast 10 MG tablet    SINGULAIR    30 tablet    Take 1 tablet (10 mg) by mouth At Bedtime       prenatal multivitamin  plus iron 27-0.8 MG Tabs per tablet      Take 1 tablet by mouth daily

## 2017-06-14 NOTE — PROGRESS NOTES
Procedure: Bilateral Myringotomy with Tube Placement    Technique- After discussion of the risks and benefits of myringotomy, including the risk of otorrhea and residual persistent perforation, informed consent was signed and placed in the chart.  I began with the Left side side.  I proceeded to position the patient in a semi-supine position in the examination chair.  Using the binocular surgical microscope, I then proceeded to clean the canal of cerumen and squamous debris.  I was able to see the tympanic membrane.  Using a small suction tip, I applied  Tetracaine on cotton onto the tympanic membrane.  After allowing the tetracaine to numb the area for 15 minutes, I then proceeded to use a myringotomy knife to make a radially oriented incision in the tympanic membrane.  The middle ear was clear.  Next, I proceeded to place a 1.02 mm inner diameter beveled grommet tube through the incision.  After confirming good positioning and a clearly visible open tube, the procedure was complete.    On the right side: I proceeded to clean the canal of cerumen and squamous debris.  I was able to see the tympanic membrane.  Using a small suction tip, I applied  Tetracaine on cotton onto the tympanic membrane.  After allowing the tetracaine to numb the area for 15 minutes, I then proceeded to use a myringotomy knife to make a radially oriented incision in the tympanic membrane.  The middle ear was clear.  Next, I proceeded to place a 1.02 mm inner diameter beveled grommet tube through the incision.  After confirming good positioning and a clearly visible open tube, the procedure was complete.      A/P - The patient was instructed on water precautions and given a prescription for otic antibiotic drops to use for three days.  I will see them in 2 to 4 weeks for follow up and repeat audiogram.  The patient was instructed to call in or return sooner if there was any drainage from the ear.      Progress note was partially captured by  Cinthia Campa MA and reviewed/addended by Dr. Wright for accuracy and content.      Vamshi Wright M.D.

## 2017-06-14 NOTE — NURSING NOTE
"Chief Complaint   Patient presents with     RECHECK     Myringotomy tubes       Initial Pulse 99  Ht 1.619 m (5' 3.75\")  Wt 87.1 kg (192 lb)  LMP 03/26/2017 (Exact Date)  SpO2 99%  BMI 33.22 kg/m2 Estimated body mass index is 33.22 kg/(m^2) as calculated from the following:    Height as of this encounter: 1.619 m (5' 3.75\").    Weight as of this encounter: 87.1 kg (192 lb).  Medication Reconciliation: complete  "

## 2017-06-14 NOTE — NURSING NOTE
"Chief Complaint   Patient presents with     RECHECK     Myringotomy tubes       Initial Ht 1.619 m (5' 3.75\")  LMP 03/26/2017 (Exact Date) Estimated body mass index is 33.39 kg/(m^2) as calculated from the following:    Height as of 5/31/17: 1.619 m (5' 3.75\").    Weight as of 5/31/17: 87.5 kg (193 lb).  Medication Reconciliation: complete   EFRA Ramirez  "

## 2017-06-21 ENCOUNTER — TRANSFERRED RECORDS (OUTPATIENT)
Dept: HEALTH INFORMATION MANAGEMENT | Facility: CLINIC | Age: 32
End: 2017-06-21

## 2017-06-22 ENCOUNTER — PRENATAL OFFICE VISIT (OUTPATIENT)
Dept: OBGYN | Facility: OTHER | Age: 32
End: 2017-06-22
Payer: COMMERCIAL

## 2017-06-22 VITALS
WEIGHT: 192.75 LBS | BODY MASS INDEX: 33.35 KG/M2 | HEART RATE: 88 BPM | DIASTOLIC BLOOD PRESSURE: 70 MMHG | SYSTOLIC BLOOD PRESSURE: 120 MMHG

## 2017-06-22 DIAGNOSIS — O09.819 PREGNANCY CONCEIVED THROUGH IN VITRO FERTILIZATION: ICD-10-CM

## 2017-06-22 DIAGNOSIS — O30.042 DICHORIONIC DIAMNIOTIC TWIN PREGNANCY IN SECOND TRIMESTER: Primary | ICD-10-CM

## 2017-06-22 DIAGNOSIS — O34.219 HISTORY OF CESAREAN DELIVERY AFFECTING PREGNANCY: ICD-10-CM

## 2017-06-22 PROCEDURE — 99207 ZZC COMPLICATED OB VISIT: CPT | Performed by: OBSTETRICS & GYNECOLOGY

## 2017-06-22 NOTE — PATIENT INSTRUCTIONS
I usually start with pyridoxine-doxylamine succinate combination therapy for nausea in pregnancy.  It is Unisom which you can find over the counter, and vitamin B6.  Recommend one tablet of Unisom by mouth at night, and Vitamin B6 25 mg by mouth every 8 hours.  You may also try josefina containing foods (josefina lollipops, josefina tea).

## 2017-06-22 NOTE — PROGRESS NOTES
Presents for routine  appointment.    Patient continue to have intermittent spotting.  It varies from brown and red.  No cramping or pressure.    Dr. Escamilla's note reviewed (17).  Repeat labs ordered.  She is currently not needing medication.   She also saw Dr. Wright for ear concerns on 17.  She just had tubes placed on Wednesday.  She is scheduled to see him for follow up.    New OB visit done by Gaby Sims on 17.  Her note was reviewed.  MFM referral placed.  [place] [facility]    She decided not to continue metformin.    No LOF/VB/Ctxs.    ROS:   and GI otherwise negative.     MFM US reviewed.      JACOB NUCHAL TRANS TWINS W/US2017  [facility]  Result Impression   Indication: 1st Trimester Screening, Twins.   ________________________________________________________________________  History: Age: 31 years.  ________________________________________________________________________  Dating:  LMP:2017EDC:2017GA by LMP:12w3d  Biometry Fetus 1:EDC:2017GA by Fetus 1:12w6d  Biometry Fetus 2:EDC:2017GA by Fetus 2:12w6d  Best Overall Assessment:2017EDC:2017Assessed GA:12w3d  The Best Overall Assessment is based on the LMP.  ________________________________________________________________________  General Evaluation:  Fetus 1:  Fetal heart activity: The cardiac rate and rhythm is within normal limits. Fetal heart rate: 155 bpm.   Presentation: Transverse, inferior.   Fetal movement: normal fetal movement.   Amniotic Fluid: Normal.   Placenta: Posterior. Thick dividing membrane is seen. Placenta Grade: Grade 0.     Fetus 2:  Fetal heart activity: The cardiac rate and rhythm is within normal limits. Fetal heart rate: 150 bpm.   Presentation: Transverse, superior.   Fetal movement: normal fetal movement.   Amniotic Fluid: Normal.   Placenta: Anterior. Thick dividing membrane is seen. Placenta Grade: Grade 0.    ________________________________________________________________________  First Trimester Scan:  Twin gestation. Chorionicity: Dichorionic/Diamniotic.  Fetus 1:  Biometry:  Fetal Measurements used for the estimation of the gestational age are bolded.  CRL65.3it07hm%12w6d(11w6d to 13w6d)  NT1.70mm    Additional Markers for Risk Assessment: Nasal bone present.  Fetal Anatomy:  The calvarium is intact, a falx is visualized. The choroids fill the ventricles. The fetal face is symmetrical.  A normal fetal profile is seen.  The fetal heart is seen within the thorax. The stomach is seen. The fetal bladder is seen. The fetal cord insertion is visualized and appears normal. Both upper and lower extremeties are visualized.     Fetus 2:  Biometry:  Fetal Measurements used for the estimation of the gestational age are bolded.  CRL65.9ws65ya%12w6d(11w6d to 13w6d)  NT2.00mm    Additional Markers for Risk Assessment: Nasal bone present.  Fetal Anatomy:  The calvarium is intact, a falx is visualized. The choroids fill the ventricles. The fetal face is symmetrical.  A normal fetal profile is seen.  The fetal heart is seen within the thorax. The stomach is seen. The fetal bladder is seen. The fetal cord insertion is visualized and appears normal. Both upper and lower extremeties are visualized.   ________________________________________________________________________  Maternal Structures:  Cervix: normal.  Right Ovary: visualized.   Left Ovary: visualized.  ________________________________________________________________________  Comments:  Subchorionic bleed still visualized, patient continues to spot or pass small clot occasionally.  It measures  60 x 47 x 27 mm.   ________________________________________________________________________  Report Summary:  Impression:   1) Dichorionic diamniotic intrauterine pregnancy at 12 3/7 weeks gestational age.  2) The nuchal translucency measurement is within the normal range for both  fetuses.  3) The nasal bone is present for both fetuses.  4) There is a subchorionic hemorrhage (see above report for details).     Recommendations:   The NT measurements appeared normal. Lucero opted to proceed with NIPT. A copy of the results will be forwarded to you when they become available.    Comprehensive ultrasound is recommended at 18-20 weeks and fetal echocardiogram is recommended with Pediatric Cardiology at 20-22 weeks gestation.    Return to primary provider for continued prenatal care.     Thank you for the opportunity to participate in the care of this patient.  If you have questions regarding today's evaluation or if we can be of further service, please contact the [facility].     **Fetal anomalies may be present but not detected**      REPORT SIGNED BY: Marisa Chung MD, Specialist in Maternal-Fetal Medicine         Please see Prenatal Vitals and Notes Flowsheet for objective data.    A/P:  31 year old  at 12w3d       ICD-10-CM    1. Dichorionic diamniotic twin pregnancy in second trimester O30.042    2. Pregnancy conceived through in vitro fertilization O09.819    3. History of  delivery affecting pregnancy O34.219        N&V signs and symptoms discussed.   MFM US at 18-20 weeks.  ECHO at 20-22 weeks.   She will continue to observe vaginal bleeding and let me know if things change.   Follow up in 4 weeks.      Alee Duncan MD

## 2017-06-22 NOTE — MR AVS SNAPSHOT
After Visit Summary   2017    Lucero Ku    MRN: [id number]           Patient Information     Date Of Birth          1985        Visit Information        Provider Department      2017 11:15 AM Alee Duncan MD [facility]        Today's Diagnoses     Dichorionic diamniotic twin pregnancy in second trimester    -  1    Pregnancy conceived through in vitro fertilization        History of  delivery affecting pregnancy          Care Instructions    I usually start with pyridoxine-doxylamine succinate combination therapy for nausea in pregnancy.  It is Unisom which you can find over the counter, and vitamin B6.  Recommend one tablet of Unisom by mouth at night, and Vitamin B6 25 mg by mouth every 8 hours.  You may also try josefina containing foods (josefina lollipops, josefina tea).           Follow-ups after your visit        Follow-up notes from your care team     Return in about 4 weeks (around 2017) for OB Visit.      Your next 10 appointments already scheduled     2017  1:30 PM CDT   [facility]    [address] [address] [phone]            2017 10:00 AM CDT   LAB with NL LAB [facility] ([facility])    [address] [address] [phone]           Patient must bring picture ID.  Patient should be prepared to give a urine specimen  Please do not eat 10-12 hours before your appointment if you are coming in fasting for labs on lipids, cholesterol, or glucose (sugar).  Pregnant women should follow their Care Team instructions. Water with medications is okay. Do not drink coffee or other fluids.   If you have concerns about taking  your medications, please ask at office or if scheduling via [facility], send a message by clicking on Secure Messaging, Message Your Care Team.             Aug 18, 2017  9:30 AM CDT   Return Visit with Santiago Escamilla MD   [facility] ([facility])    [address] [address] [place] [address] [phone]              Who to contact     If you have questions or need follow up information about today's clinic visit or your schedule please contact [facility] [place] directly at [phone].  Normal or non-critical lab and imaging results will be communicated to you by ePrivateHirehart, letter or phone within 4 business days after the clinic has received the results. If you do not hear from us within 7 days, please contact the clinic through ePrivateHirehart or phone. If you have a critical or abnormal lab result, we will notify you by phone as soon as possible.  Submit refill requests through [employer] or call your pharmacy and they will forward the refill request to us. Please allow 3 business days for your refill to be completed.          Additional Information About Your Visit        [email or web address] Information     Gient gives you secure access to your electronic health record. If you see a primary care provider, you can also send messages to your care team and make appointments. If you have questions, please call your primary care clinic.  If you do not have a primary care provider, please call [phone] and they will assist you.        Care EveryWhere ID     This is your Care EveryWhere ID. This could be used by other organizations to access your [facility] medical records  [id number]        Your Vitals Were     Pulse Last Period BMI (Body Mass Index)             88 03/26/2017 (Exact Date) 33.35 kg/m2          Blood Pressure from Last 3 Encounters:   06/22/17 120/70   05/23/17 134/70   05/05/17 135/76    Weight from Last 3 Encounters:   06/22/17 192 lb 12 oz (87.4 kg)   06/14/17 192 lb (87.1 kg)   05/31/17 193 lb (87.5 kg)              Today, you had the following     No orders found for display       Primary Care  Provider Office Phone # Fax #    Rachna Felder, ESTHER NOLBERTO [phone]        [place]  [address] [place] [address]        Equal Access to Services     DARYL JUNG : Hadii aad ku hadrommelo Soomaali, waaxda luqadaha, qaybta kaalmada adeegyada, waxvanessa rodriguezn greg hayes vivian schmitz. So [facility] [phone].    ATENCIÓN: Si habla español, tiene a ley disposición servicios gratuitos de asistencia lingüística. [facility] [phone].    We comply with applicable federal civil rights laws and [place] laws. We do not discriminate on the basis of race, color, national origin, age, disability sex, sexual orientation or gender identity.            Thank you!     Thank you for choosing [facility]  for your care. Our goal is always to provide you with excellent care. Hearing back from our patients is one way we can continue to improve our services. Please take a few minutes to complete the written survey that you may receive in the mail after your visit with us. Thank you!             Your Updated Medication List - Protect others around you: Learn how to safely use, store and throw away your medicines at www.disposemymeds.org.          This list is accurate as of: 6/22/17 11:40 AM.  Always use your most recent med list.                   Brand Name Dispense Instructions for use Diagnosis    budesonide 32 MCG/ACT spray    RHINOCORT AQUA    1 Bottle    Spray 2 sprays into both nostrils 2 times daily    Chronic rhinitis, Dysfunction of eustachian tube, right       ENDOMETRIN 100 MG   Generic drug:  progesterone      Place 100 mg vaginally 2 times daily        methylPREDNISolone 4 MG tablet    MEDROL DOSEPAK    21 tablet    Follow package instructions    Dysfunction of eustachian tube, bilateral, Acute otitis media, unspecified laterality, unspecified otitis media type       montelukast 10 MG tablet    SINGULAIR    30 tablet    Take 1 tablet (10 mg) by mouth At Bedtime    ETD  (eustachian tube dysfunction), right, Chronic rhinitis       prenatal multivitamin  plus iron 27-0.8 MG Tabs per tablet      Take 1 tablet by mouth daily

## 2017-06-22 NOTE — NURSING NOTE
"Chief Complaint   Patient presents with     Prenatal Care       Initial /70 (BP Location: Right arm, Patient Position: Chair, Cuff Size: Adult Regular)  Pulse 88  Wt 192 lb 12 oz (87.4 kg)  LMP 03/26/2017 (Exact Date)  BMI 33.35 kg/m2 Estimated body mass index is 33.35 kg/(m^2) as calculated from the following:    Height as of 6/14/17: 5' 3.75\" (1.619 m).    Weight as of this encounter: 192 lb 12 oz (87.4 kg).  Medication Reconciliation: complete     Vianey Ryan, [facility]  June 22, 2017      "

## 2017-07-17 ENCOUNTER — PRENATAL OFFICE VISIT (OUTPATIENT)
Dept: OBGYN | Facility: OTHER | Age: 32
End: 2017-07-17
Payer: COMMERCIAL

## 2017-07-17 VITALS
SYSTOLIC BLOOD PRESSURE: 116 MMHG | DIASTOLIC BLOOD PRESSURE: 70 MMHG | BODY MASS INDEX: 33.73 KG/M2 | HEART RATE: 96 BPM | WEIGHT: 195 LBS

## 2017-07-17 DIAGNOSIS — O46.92 VAGINAL BLEEDING IN PREGNANCY, SECOND TRIMESTER: ICD-10-CM

## 2017-07-17 DIAGNOSIS — O30.042 DICHORIONIC DIAMNIOTIC TWIN PREGNANCY IN SECOND TRIMESTER: Primary | ICD-10-CM

## 2017-07-17 DIAGNOSIS — O34.219 HISTORY OF CESAREAN DELIVERY AFFECTING PREGNANCY: ICD-10-CM

## 2017-07-17 DIAGNOSIS — O09.819 PREGNANCY CONCEIVED THROUGH IN VITRO FERTILIZATION: ICD-10-CM

## 2017-07-17 PROCEDURE — 99207 ZZC COMPLICATED OB VISIT: CPT | Performed by: OBSTETRICS & GYNECOLOGY

## 2017-07-17 NOTE — PROGRESS NOTES
Presents for routine  appointment.    Nausea improved with unisom and b6.  Vaginal bleeding persists but improved.  No bleeding since Friday.  Declines vaginal exam today.  Feeling more fatigued and wondering if hgb is low.    Verify normal  No LOF/VB/Ctxs.    ROS:   and GI  negative.     Please see Prenatal Vitals and Notes Flowsheet for objective data.    A/P:  31 year old  at 16w0d     1. Dichorionic diamniotic twin pregnancy in second trimester   - Genetic screening - saw [facility] and Verify normal.    - 20 week ultrasound scheduled  With [facility] August 15.  ECHO will be set up after that US.      2. Pregnancy conceived through in vitro fertilization  See above    3. History of  delivery affecting pregnancy  Plan repeat    4.  Vaginal bleeding, improved.     - hgb with her labs scheduled for Monday per her request.      5.  Hashimoto's thyroiditis    - labs on Monday through her endocrinology.  No recent changes in medication.  Follow up with Dr. Escamilla as previously scheduled.     Follow up in 4  week(s).      Alee Duncan MD

## 2017-07-17 NOTE — NURSING NOTE
"Chief Complaint   Patient presents with     Prenatal Care       Initial /70 (BP Location: Right arm, Patient Position: Chair, Cuff Size: Adult Regular)  Pulse 96  Wt 195 lb (88.5 kg)  LMP 03/26/2017 (Exact Date)  BMI 33.73 kg/m2 Estimated body mass index is 33.73 kg/(m^2) as calculated from the following:    Height as of 6/14/17: 5' 3.75\" (1.619 m).    Weight as of this encounter: 195 lb (88.5 kg).  Medication Reconciliation: complete     Vianey Ryan, [facility]  July 17, 2017      "

## 2017-07-17 NOTE — MR AVS SNAPSHOT
After Visit Summary   2017    Lucero Ku    MRN: [id number]           Patient Information     Date Of Birth          1985        Visit Information        Provider Department      2017 2:30 PM Alee Duncan MD [facility]        Today's Diagnoses     Dichorionic diamniotic twin pregnancy in second trimester    -  1    Pregnancy conceived through in vitro fertilization        History of  delivery affecting pregnancy        Vaginal bleeding in pregnancy, second trimester           Follow-ups after your visit        Follow-up notes from your care team     Return in about 4 weeks (around 2017) for OB Visit.      Your next 10 appointments already scheduled     2017  1:30 PM CDT   Return Visit with Vamshi Wright MD   [facility] ([facility])    [address] [address] [phone]            2017 10:00 AM CDT   LAB with NL LAB [facility] ([facility])    [address] [address] [phone]           Patient must bring picture ID.  Patient should be prepared to give a urine specimen  Please do not eat 10-12 hours before your appointment if you are coming in fasting for labs on lipids, cholesterol, or glucose (sugar).  Pregnant women should follow their Care Team instructions. Water with medications is okay. Do not drink coffee or other fluids.   If you have concerns about taking  your medications, please ask at office or if scheduling via [employer] Fusion[employer], send a message by clicking on Secure Messaging, Message Your Care Team.            Aug 18, 2017  9:30 AM CDT   Return Visit with Santiago Escamilla MD   [facility]    [address] [place] [address] [phone]              Future tests that were ordered for you today     Open Future Orders         Priority Expected Expires Ordered    **Hemoglobin FUTURE anytime Routine 7/17/2017 7/17/2018 7/17/2017            Who to contact     If you have questions or need follow up information about today's clinic visit or your schedule please contact [facility] [place] directly at [phone].  Normal or non-critical lab and imaging results will be communicated to you by MyChart, letter or phone within 4 business days after the clinic has received the results. If you do not hear from us within 7 days, please contact the clinic through MyChart or phone. If you have a critical or abnormal lab result, we will notify you by phone as soon as possible.  Submit refill requests through [employer] or call your pharmacy and they will forward the refill request to us. Please allow 3 business days for your refill to be completed.          Additional Information About Your Visit        MyChart Information     [employer] gives you secure access to your electronic health record. If you see a primary care provider, you can also send messages to your care team and make appointments. If you have questions, please call your primary care clinic.  If you do not have a primary care provider, please call [phone] and they will assist you.        Care EveryWhere ID     This is your Care EveryWhere ID. This could be used by other organizations to access your [facility] medical records  [id number]        Your Vitals Were     Pulse Last Period BMI (Body Mass Index)             96 03/26/2017 (Exact Date) 33.73 kg/m2          Blood Pressure from Last 3 Encounters:   07/17/17 116/70   06/22/17 120/70   05/23/17 134/70    Weight from Last 3 Encounters:   07/17/17 195 lb (88.5 kg)   06/22/17 192 lb 12 oz (87.4 kg)   06/14/17 192 lb (87.1 kg)               Primary Care Provider Office Phone # Fax #    ESTHER Cabrera [phone]       [place]  [place] [address]        Equal Access to  Services     [facility]: Hadii aad ku hadrommelsunny Samantharossana, waaxda luqadaha, qaybta kaalmada erinn, [place] . So [facility] [phone].    ATENCIÓN: Si petrala ty, tiene a ley disposición servicios gratuitos de asistencia lingüística. Llame al [phone].    We comply with applicable federal civil rights laws and [place] laws. We do not discriminate on the basis of race, color, national origin, age, disability sex, sexual orientation or gender identity.            Thank you!     Thank you for choosing [facility]  for your care. Our goal is always to provide you with excellent care. Hearing back from our patients is one way we can continue to improve our services. Please take a few minutes to complete the written survey that you may receive in the mail after your visit with us. Thank you!             Your Updated Medication List - Protect others around you: Learn how to safely use, store and throw away your medicines at www.disposemymeds.org.          This list is accurate as of: 7/17/17  2:56 PM.  Always use your most recent med list.                   Brand Name Dispense Instructions for use Diagnosis    budesonide 32 MCG/ACT spray    RHINOCORT AQUA    1 Bottle    Spray 2 sprays into both nostrils 2 times daily    Chronic rhinitis, Dysfunction of eustachian tube, right       ENDOMETRIN 100 MG   Generic drug:  progesterone      Place 100 mg vaginally 2 times daily        methylPREDNISolone 4 MG tablet    MEDROL DOSEPAK    21 tablet    Follow package instructions    Dysfunction of eustachian tube, bilateral, Acute otitis media, unspecified laterality, unspecified otitis media type       montelukast 10 MG tablet    SINGULAIR    30 tablet    Take 1 tablet (10 mg) by mouth At Bedtime    ETD (eustachian tube dysfunction), right, Chronic rhinitis       prenatal multivitamin  plus iron 27-0.8 MG Tabs per tablet      Take 1 tablet by mouth daily

## 2017-07-21 NOTE — PROGRESS NOTES
History of Present Illness - Lucero Ku is a 31 year old female presenting in clinic today for a recheck on recheck ears  The patient had bilateral tubes placed over a month ago. Overall hears well with occ feeling that she can't hear as well. No d/c.    Present Symptoms include: otolgia and otorhea and they are   getting better .  Lucero denies otolgia.    Lucero has a history of Myringotomy and Tubes surgery.        Past Medical History -   Past Medical History:   Diagnosis Date     Hashimoto's thyroiditis      Infertility, female     in vitro, tiwn birth     PCOS (polycystic ovarian syndrome)        Current Medications -   Current Outpatient Prescriptions:      Prenatal Vit-Fe Fumarate-FA (PRENATAL MULTIVITAMIN  PLUS IRON) 27-0.8 MG TABS per tablet, Take 1 tablet by mouth daily, Disp: , Rfl:      progesterone (ENDOMETRIN) 100 MG, Place 100 mg vaginally 2 times daily, Disp: , Rfl:      methylPREDNISolone (MEDROL DOSEPAK) 4 MG tablet, Follow package instructions (Patient not taking: Reported on 6/22/2017), Disp: 21 tablet, Rfl: 0     montelukast (SINGULAIR) 10 MG tablet, Take 1 tablet (10 mg) by mouth At Bedtime (Patient not taking: Reported on 6/22/2017), Disp: 30 tablet, Rfl: 3     budesonide (RHINOCORT AQUA) 32 MCG/ACT spray, Spray 2 sprays into both nostrils 2 times daily (Patient not taking: Reported on 6/22/2017), Disp: 1 Bottle, Rfl: 11    Allergies -   Allergies   Allergen Reactions     Hydrocodone-Acetaminophen Nausea and Vomiting     Reaction after taking Vicodin for tooth extraction.  Can take plain Tylenol.       Social History -   Social History     Social History     Marital status:      Spouse name: N/A     Number of children: 2     Years of education: N/A     Occupational History      [employer]     Social History Main Topics     Smoking status: Never Smoker     Smokeless tobacco: Never Used      Comment: No smokers in home.     Alcohol use Yes       Comment: social     Drug use: No     Sexual activity: Yes     Partners: Male     Birth control/ protection: None     Other Topics Concern     Parent/Sibling W/ Cabg, Mi Or Angioplasty Before 65f 55m? No      Service No     Blood Transfusions No     Caffeine Concern No     Occupational Exposure Yes     [employer]     Hobby Hazards No     Sleep Concern No     Stress Concern No     Weight Concern No     Special Diet No     Back Care No     Exercise No     Seat Belt Yes     Social History Narrative     to [facility] and lives in [place].  No smokers in the home.  No concerns about domestic violence.  Has an indoor cat.  Aware of toxoplasmosis precautions.         Family History -   Family History   Problem Relation Age of Onset     Hypertension Mother      Hypertension Father      CANCER Father 55     Stage IV Kidney (9/13)     Thyroid Disease Father      hypothyroidism     DIABETES Maternal Grandfather      Hypertension Maternal Grandfather      Lipids Maternal Grandfather      Defibrilator     CANCER Paternal Grandmother      Thyroid Disease Paternal Grandmother      Hypertension Paternal Grandmother      CANCER Paternal Grandfather      Thyroid Disease Paternal Grandfather      Hypertension Paternal Grandfather      Hypertension Sister      Sleep Apnea Sister      Anxiety Disorder Sister      Hypertension Brother      Anxiety Disorder Brother        Review of Systems - As per HPI and PMHx, otherwise review of system review of the head and neck negative.    Physical Exam  LMP 03/26/2017 (Exact Date)  BMI: There is no height or weight on file to calculate BMI.    General - The patient is well nourished and well developed, and appears to have good nutritional status.  Alert and oriented to person and place, answers questions and cooperates with examination appropriately.    SKIN - No suspicious lesions or rashes.  Respiration - No respiratory distress.     Head and Face - Normocephalic and atraumatic, with no  gross asymmetry noted of the contour of the facial features.  The facial nerve is intact, with strong symmetric movements.    Voice and Breathing - The patient was breathing comfortably without the use of accessory muscles. There was no wheezing, stridor, or stertor.  The patients voice was clear and strong, and had appropriate pitch and quality.    Ears - Bilateral pinna and EACs with normal appearing overlying skin. Tympanic membrane intact with PE tubes in good position patent with mild myringosclerosis noted. bilaterally. Bony landmarks of the ossicular chain are normal. The tympanic membranes are normal in appearance. No retraction, perforation, or masses.  No fluid or purulence was seen in the external canal or the middle ear.     Eyes - Extraocular movements intact.  Sclera were not icteric or injected, conjunctiva were pink and moist.    Mouth - Examination of the oral cavity showed pink, healthy oral mucosa. No lesions or ulcerations noted.  The tongue was mobile and midline, and the dentition were in good condition.      Throat - The walls of the oropharynx were smooth, pink, moist, symmetric, and had no lesions or ulcerations.  The tonsillar pillars and soft palate were symmetric   The uvula was midline on elevation.    Neck - Normal midline excursion of the laryngotracheal complex during swallowing.  Full range of motion on passive movement.  Palpation of the occipital, submental, submandibular, internal jugular chain, and supraclavicular nodes did not demonstrate any abnormal lymph nodes or masses.  The carotid pulse was palpable bilaterally.  Palpation of the thyroid was soft and smooth, with no nodules or goiter appreciated.  The trachea was mobile and midline.    Nose - External contour is symmetric, no gross deflection or scars.  Nasal mucosa is pink and moist with no abnormal mucus.  The septum was midline and non-obstructive, turbinates of normal size and position.  No polyps, masses, or purulence  noted on examination.    Neuro - Nonfocal neuro exam is normal, CN 2 through 12 intact, normal gait and muscle tone.      Performed in clinic today:  Audiologic Studies - An audiogram and tympanogram were performed today as part of the evaluation and personally reviewed. The tympanogram shows normal Type A curves, with normal canal volumes and middle ear pressures.  There is no sign of eustachian tube dysfunction or middle ear effusion.  The audiogram was also normal.  The sensorineural hearing was age-appropriate, with no evidence of conductive hearing loss or significant asymmetry.  Actually tymps were flat with high volume indicating patent PE tubes.          A/P - Lucero PROSPER Elmira is a 31 year old female with bilateral PE tubes functioning well.    Lucero should follow up in in 6 months.      At Lucero next appointment they will not need a hearing test.      Vamshi Wright MD

## 2017-07-24 ENCOUNTER — OFFICE VISIT (OUTPATIENT)
Dept: OTOLARYNGOLOGY | Facility: CLINIC | Age: 32
End: 2017-07-24
Payer: COMMERCIAL

## 2017-07-24 ENCOUNTER — OFFICE VISIT (OUTPATIENT)
Dept: AUDIOLOGY | Facility: CLINIC | Age: 32
End: 2017-07-24
Payer: COMMERCIAL

## 2017-07-24 VITALS — OXYGEN SATURATION: 99 % | HEART RATE: 107 BPM | BODY MASS INDEX: 34.08 KG/M2 | WEIGHT: 197 LBS

## 2017-07-24 DIAGNOSIS — Z98.890 H/O MYRINGOTOMY: Primary | ICD-10-CM

## 2017-07-24 DIAGNOSIS — H69.93 DYSFUNCTION OF EUSTACHIAN TUBE, BILATERAL: Primary | ICD-10-CM

## 2017-07-24 PROCEDURE — 92567 TYMPANOMETRY: CPT | Performed by: AUDIOLOGIST

## 2017-07-24 PROCEDURE — 99213 OFFICE O/P EST LOW 20 MIN: CPT | Performed by: OTOLARYNGOLOGY

## 2017-07-24 PROCEDURE — 92557 COMPREHENSIVE HEARING TEST: CPT | Performed by: AUDIOLOGIST

## 2017-07-24 NOTE — MR AVS SNAPSHOT
After Visit Summary   7/24/2017    Lucero Ku    MRN: [id number]           Patient Information     Date Of Birth          1985        Visit Information        Provider Department      7/24/2017 1:15 PM Stacey Morton, [facility]        Today's Diagnoses     Dysfunction of eustachian tube, bilateral    -  1       Follow-ups after your visit        Your next 10 appointments already scheduled     Jul 25, 2017 10:00 AM CDT   LAB with NL LAB [facility]   [facility]    [address] [facility] [address] [phone]           Patient must bring picture ID.  Patient should be prepared to give a urine specimen  Please do not eat 10-12 hours before your appointment if you are coming in fasting for labs on lipids, cholesterol, or glucose (sugar).  Pregnant women should follow their Care Team instructions. Water with medications is okay. Do not drink coffee or other fluids.   If you have concerns about taking  your medications, please ask at office or if scheduling via [employer], send a message by clicking on Secure Messaging, Message Your Care Team.            Aug 18, 2017  9:30 AM CDT   Return Visit with Santiago Escamilla MD   [facility]    [address] [place] [address] [phone]            Aug 24, 2017 11:30 AM CDT   ESTABLISHED PRENATAL with Alee Duncan MD   [facility]    [address] [facility] [address] [phone]              Who to contact     If you have questions or need follow up information about today's clinic visit or your schedule please contact [facility] directly at [phone].  Normal or non-critical lab and imaging results will be communicated to you by MyChart, letter or phone within 4 business days after the clinic has received the results. If  you do not hear from us within 7 days, please contact the clinic through [employer] or phone. If you have a critical or abnormal lab result, we will notify you by phone as soon as possible.  Submit refill requests through [employer] or call your pharmacy and they will forward the refill request to us. Please allow 3 business days for your refill to be completed.          Additional Information About Your Visit        [employer] Information     [employer] gives you secure access to your electronic health record. If you see a primary care provider, you can also send messages to your care team and make appointments. If you have questions, please call your primary care clinic.  If you do not have a primary care provider, please call [phone] and they will assist you.        Care EveryWhere ID     This is your Care EveryWhere ID. This could be used by other organizations to access your [place] medical records  [id number]        Your Vitals Were     Last Period                   03/26/2017 (Exact Date)            Blood Pressure from Last 3 Encounters:   07/17/17 116/70   06/22/17 120/70   05/23/17 134/70    Weight from Last 3 Encounters:   07/17/17 195 lb (88.5 kg)   06/22/17 192 lb 12 oz (87.4 kg)   06/14/17 192 lb (87.1 kg)              We Performed the Following     AUDIOGRAM/TYMPANOGRAM - INTERFACE     COMPREHENSIVE HEARING TEST     TYMPANOMETRY        Primary Care Provider Office Phone # Fax #    Rachna ESTHER Cheung [facility] [phone]       [facility] [address] [facility]   [place] [address]        Equal Access to Services     [facility] : Hadii aad ku hadasho Soomaali, waaxda luqadaha, qaybta kaalmada adeegyada, waxvanessa idiin hayclementn greg park . So [facility] [phone].    ATENCIÓN: Si habla español, tiene a ley disposición servicios gratuitos de asistencia lingüística. Llame al [phone].    We comply with applicable federal civil rights laws and [place] laws. We do not discriminate  on the basis of race, color, national origin, age, disability sex, sexual orientation or gender identity.            Thank you!     Thank you for choosing [facility]  for your care. Our goal is always to provide you with excellent care. Hearing back from our patients is one way we can continue to improve our services. Please take a few minutes to complete the written survey that you may receive in the mail after your visit with us. Thank you!             Your Updated Medication List - Protect others around you: Learn how to safely use, store and throw away your medicines at www.disposemymeds.org.          This list is accurate as of: 7/24/17  1:58 PM.  Always use your most recent med list.                   Brand Name Dispense Instructions for use Diagnosis    budesonide 32 MCG/ACT spray    RHINOCORT AQUA    1 Bottle    Spray 2 sprays into both nostrils 2 times daily    Chronic rhinitis, Dysfunction of eustachian tube, right       ENDOMETRIN 100 MG   Generic drug:  progesterone      Place 100 mg vaginally 2 times daily        methylPREDNISolone 4 MG tablet    MEDROL DOSEPAK    21 tablet    Follow package instructions    Dysfunction of eustachian tube, bilateral, Acute otitis media, unspecified laterality, unspecified otitis media type       montelukast 10 MG tablet    SINGULAIR    30 tablet    Take 1 tablet (10 mg) by mouth At Bedtime    ETD (eustachian tube dysfunction), right, Chronic rhinitis       prenatal multivitamin  plus iron 27-0.8 MG Tabs per tablet      Take 1 tablet by mouth daily

## 2017-07-24 NOTE — NURSING NOTE
"Chief Complaint   Patient presents with     RECHECK     ears       Initial Pulse 107  Wt 89.4 kg (197 lb)  LMP 03/26/2017 (Exact Date)  SpO2 99%  BMI 34.08 kg/m2 Estimated body mass index is 34.08 kg/(m^2) as calculated from the following:    Height as of 6/14/17: 1.619 m (5' 3.75\").    Weight as of this encounter: 89.4 kg (197 lb).  Medication Reconciliation: complete  "

## 2017-07-24 NOTE — PROGRESS NOTES
AUDIOLOGY REPORT     SUMMARY: Audiology visit completed. See audiogram for results.     RECOMMENDATIONS: Follow-up with ENT    Mireya Ramos Licensed Audiologist #[id number]

## 2017-07-24 NOTE — MR AVS SNAPSHOT
After Visit Summary   7/24/2017    Lucero Ku    MRN: [id number]           Patient Information     Date Of Birth          1985        Visit Information        Provider Department      7/24/2017 1:30 PM Vamshi Wright MD [facility]        Today's Diagnoses     H/O myringotomy    -  1       Follow-ups after your visit        Additional Services     AUDIOLOGY ADULT REFERRAL                 Your next 10 appointments already scheduled     Jul 25, 2017 10:00 AM CDT   LAB with NL LAB [facility]   [facility]    [address] [facility] [id number] [phone]           Patient must bring picture ID.  Patient should be prepared to give a urine specimen  Please do not eat 10-12 hours before your appointment if you are coming in fasting for labs on lipids, cholesterol, or glucose (sugar).  Pregnant women should follow their Care Team instructions. Water with medications is okay. Do not drink coffee or other fluids.   If you have concerns about taking  your medications, please ask at office or if scheduling via [employer], send a message by clicking on Secure Messaging, Message Your Care Team.            Aug 18, 2017  9:30 AM CDT   Return Visit with Santiago Escamilla MD   [facility] ([facility])    [address] [place] [address] [phone]            Aug 24, 2017 11:30 AM CDT   ESTABLISHED PRENATAL with Alee Duncan MD   [facility] ([facility])    [address] [id number] [phone]              Who to contact     If you have questions or need follow up information about today's clinic visit or your schedule please contact [facility] directly at [phone].  Normal or non-critical lab and imaging results will be communicated to you by MyChart, letter or phone within 4 business days after the clinic  has received the results. If you do not hear from us within 7 days, please contact the clinic through [employer] or phone. If you have a critical or abnormal lab result, we will notify you by phone as soon as possible.  Submit refill requests through [employer] or call your pharmacy and they will forward the refill request to us. Please allow 3 business days for your refill to be completed.          Additional Information About Your Visit        [email or web address] Information     [employer] gives you secure access to your electronic health record. If you see a primary care provider, you can also send messages to your care team and make appointments. If you have questions, please call your primary care clinic.  If you do not have a primary care provider, please call [phone] and they will assist you.        Care EveryWhere ID     This is your Care EveryWhere ID. This could be used by other organizations to access your [place] medical records  [id number]        Your Vitals Were     Pulse Last Period Pulse Oximetry BMI (Body Mass Index)          107 03/26/2017 (Exact Date) 99% 34.08 kg/m2         Blood Pressure from Last 3 Encounters:   07/17/17 116/70   06/22/17 120/70   05/23/17 134/70    Weight from Last 3 Encounters:   07/24/17 197 lb (89.4 kg)   07/17/17 195 lb (88.5 kg)   06/22/17 192 lb 12 oz (87.4 kg)              We Performed the Following     AUDIOLOGY ADULT REFERRAL        Primary Care Provider Office Phone # Fax #    Rachna Felder, APRN [facility] [phone]       [facility] [address] [facility]   [place] [facility] [address]        Equal Access to Services     [facility]: Hadii aad ku hadasho Soomaali, waaxda luqadaha, qaybta kaalmada adeegyada, annette hobson haymaximino park . So [facility] [phone].    ATENCIÓN: Si habla español, tiene a ley disposición servicios gratuitos de asistencia lingüística. Llame al [phone].    We comply with applicable federal civil rights laws and [place] laws.  We do not discriminate on the basis of race, color, national origin, age, disability sex, sexual orientation or gender identity.            Thank you!     Thank you for choosing [facility]  for your care. Our goal is always to provide you with excellent care. Hearing back from our patients is one way we can continue to improve our services. Please take a few minutes to complete the written survey that you may receive in the mail after your visit with us. Thank you!             Your Updated Medication List - Protect others around you: Learn how to safely use, store and throw away your medicines at www.disposemymeds.org.          This list is accurate as of: 7/24/17  3:41 PM.  Always use your most recent med list.                   Brand Name Dispense Instructions for use Diagnosis    prenatal multivitamin  plus iron 27-0.8 MG Tabs per tablet      Take 1 tablet by mouth daily

## 2017-07-25 DIAGNOSIS — O46.92 VAGINAL BLEEDING IN PREGNANCY, SECOND TRIMESTER: ICD-10-CM

## 2017-07-25 DIAGNOSIS — E03.9 ACQUIRED HYPOTHYROIDISM: ICD-10-CM

## 2017-07-25 LAB
HGB BLD-MCNC: 12.4 G/DL (ref 11.7–15.7)
T4 FREE SERPL-MCNC: 0.83 NG/DL (ref 0.76–1.46)
TSH SERPL DL<=0.05 MIU/L-ACNC: 1.96 MU/L (ref 0.4–4)

## 2017-07-25 PROCEDURE — 85018 HEMOGLOBIN: CPT | Performed by: INTERNAL MEDICINE

## 2017-07-25 PROCEDURE — 36415 COLL VENOUS BLD VENIPUNCTURE: CPT | Performed by: INTERNAL MEDICINE

## 2017-07-25 PROCEDURE — 84443 ASSAY THYROID STIM HORMONE: CPT | Performed by: INTERNAL MEDICINE

## 2017-07-25 PROCEDURE — 84439 ASSAY OF FREE THYROXINE: CPT | Performed by: INTERNAL MEDICINE

## 2017-08-01 NOTE — PROGRESS NOTES
Dear Lucero,  The thyroid function test is is good range. Continue monitoring this every 3 months during pregnancy. I have ordered this for you.     Santiago Escamilla MD  [id number]  Endocrinology Service

## 2017-08-15 ENCOUNTER — TRANSFERRED RECORDS (OUTPATIENT)
Dept: HEALTH INFORMATION MANAGEMENT | Facility: CLINIC | Age: 32
End: 2017-08-15

## 2017-08-18 ENCOUNTER — MYC MEDICAL ADVICE (OUTPATIENT)
Dept: OBGYN | Facility: OTHER | Age: 32
End: 2017-08-18

## 2017-08-18 ENCOUNTER — OFFICE VISIT (OUTPATIENT)
Dept: ENDOCRINOLOGY | Facility: CLINIC | Age: 32
End: 2017-08-18
Payer: COMMERCIAL

## 2017-08-18 VITALS
DIASTOLIC BLOOD PRESSURE: 67 MMHG | HEART RATE: 81 BPM | HEIGHT: 64 IN | BODY MASS INDEX: 34.5 KG/M2 | SYSTOLIC BLOOD PRESSURE: 123 MMHG | WEIGHT: 202.1 LBS

## 2017-08-18 DIAGNOSIS — E28.2 PCOS (POLYCYSTIC OVARIAN SYNDROME): ICD-10-CM

## 2017-08-18 DIAGNOSIS — E06.3 HASHIMOTO'S THYROIDITIS: Primary | ICD-10-CM

## 2017-08-18 PROCEDURE — 99214 OFFICE O/P EST MOD 30 MIN: CPT | Performed by: INTERNAL MEDICINE

## 2017-08-18 NOTE — LETTER
8/18/2017       RE: Lucero Ku  [address] [facility] [address]     Dear Colleague,    Thank you for referring your patient, Lucero Ku, to the [facility]. Please see a copy of my visit note below.    No notes on file    Again, thank you for allowing me to participate in the care of your patient.      Sincerely,    Santiago Escamilla MD

## 2017-08-18 NOTE — LETTER
8/18/2017       RE: Lucero Ku  [address] [facility] [address]     Dear Colleague,    Thank you for referring your patient, Lucero Ku, to the [facility]. Please see a copy of my visit note below.    Endocrinology Follow up note:     Lucero Ku is a pleasant 31 year old female who presents for a follow up for Hashimotos thyroiditis. Elevated TSH in June 2015 ( Delivered 3/4/1014).  However, she decided to wait prior to starting treatment.  TSH gradually improved and by January 2016 was normal. At that time, she just stopped breast feeding. She could have had post partum thyroiditis in resolving phase.     Patient was having fatigue and some cold intolerance along with constipation when the TSH was high.  But she was not sure if this was related to her thyroid condition.During the workup TPO antibody was checked and was found to be elevated at 1545.    Lucero has a diagnosis of polycystic ovarian syndrome leading to infertility.  She currently off metformin.  She had twins have her in vitro fertilization. She had a successful IVF implant and is at 20 weeks of gestation (Due in December 2017)      Her recent thyroid hormone levels for been normal  [facility] Latest Ref Rng & Units 7/25/2017 5/31/2017   TSH 0.40 - 4.00 mU/L 1.96 2.50   T4 FREE 0.76 - 1.46 ng/dL 0.83 0.98     [facility] Latest Ref Rng & Units 4/21/2017   TSH 0.40 - 4.00 mU/L 2.25   T4 FREE 0.76 - 1.46 ng/dL      Recent onset acne in face and chest that was present in the previous pregnancy as well. Currently pt notes no dysphagia or neck fullness or pain or discomfort.  No changes recently in hair growth.  No recent changes in BMs.  No changes to temp perception. No racing heart or palpitations.    PMH/ reviewed and includes the following--  Past Medical History:   Diagnosis Date     Hashimoto's thyroiditis      Infertility, female   "   in vitro, tiwn birth     PCOS (polycystic ovarian syndrome)        Soc Hx/ reviewed and includes the following--  Social History   Substance Use Topics     Smoking status: Never Smoker     Smokeless tobacco: Never Used      Comment: No smokers in home.     Alcohol use Yes      Comment: social       Fam Hx reviewed and includes the following--  Family History     Problem (# of Occurrences) Relation (Name,Age of Onset)    Anxiety Disorder (2) Sister, Brother    CANCER (3) Father (55): Stage IV Kidney (9/13), Paternal Grandmother, Paternal Grandfather    DIABETES (1) Maternal Grandfather    Hypertension (7) Mother, Father, Maternal Grandfather, Paternal Grandmother, Paternal Grandfather, Sister, Brother    Lipids (1) Maternal Grandfather: Defibrilator    Sleep Apnea (1) Sister    Thyroid Disease (3) Father: hypothyroidism, Paternal Grandmother, Paternal Grandfather          ROS: constitutional/HEENT/repro/endo reviewed completely with pertinents as above.    PE:  Gen: NAD,  /67  Pulse 81  Ht 1.619 m (5' 3.75\")  Wt 91.7 kg (202 lb 1.6 oz)  LMP 03/26/2017 (Exact Date)  BMI 34.96 kg/m2  HEENT: eomi, no scleral icteris or proptosis  Neck: s/nt.  Thyroid is palpable but without any discrete nodules.  Lymph: no cervical or supraclvicular LAD  Abd: nondistended  Ext: no tremors on outstretched hand, extremities are warm  Neuro: no resting tremor, normal gait,  nonfocal otherwise  skin: normal temp/turgor/thickness. No rashes on exposed skin.    Data:  as above    (results reviewed/discussed/explained during her visit)    A/P:   Hashimoto's thyroiditis, normal function, currently 20 weeks pregnant, twin gestation after IVF   Acne  H/O PCOS, IVF with twin gestation.     Clinically she has no symptoms of hypothyroidism.   Her TSH has been normal 1.96 in July suggesting appropriate value for gestational age.   We will retest TSH in about 6 weeks and follow closely during pregnancy.   She was made aware of the " symptoms of underactive thyroid and asked to contact sooner should she have any symptoms.     Santiago Escamilla MD  [id number]  Endocrinology Service    Patient Instructions   Please schedule for the lab appointment for first week of September.     If you have any symptoms suggestive of hypothyroidism, you can do the labs earlier.              Labs: Standing labs for TFT  6 weeks labs then do in about 6-8 weeks and post partum.     Again, thank you for allowing me to participate in the care of your patient.      Sincerely,    Santiago Escamilla MD

## 2017-08-18 NOTE — MR AVS SNAPSHOT
After Visit Summary   8/18/2017    Lucero Ku    MRN: [id number]           Patient Information     Date Of Birth          1985        Visit Information        Provider Department      8/18/2017 9:30 AM Santiago Escamilla MD [facility]        Today's Diagnoses     Hashimoto's thyroiditis    -  1    PCOS (polycystic ovarian syndrome)          Care Instructions    Please schedule for the lab appointment for first week of September.     If you have any symptoms suggestive of hypothyroidism, you can do the labs earlier.                   Follow-ups after your visit        Follow-up notes from your care team     Return in about 3 months (around 11/18/2017).      Your next 10 appointments already scheduled     Aug 24, 2017 11:30 AM CDT   ESTABL[facility] PRENATAL with Alee Duncan MD   [facility]    [address] [facility] [address]   [phone]            Aug 25, 2017 11:00 AM CDT   Ech Fetal Complete* with URFETR1   [facility] ([facility])    [address] [facility] [address]               Aug 25, 2017 12:00 PM CDT   Ech Fetal -Twin B Complete* with URFETR1   [facility] ([facility])    [address] [place] [address]               Sep 05, 2017 10:30 AM CDT   LAB with NL LAB [facility] ([facility])    [address] [facility] [id number] [phone]           Patient must bring picture ID. Patient should be prepared to give a urine specimen  Please do not eat 10-12 hours before your appointment if you are coming in fasting for labs on lipids, cholesterol, or glucose (sugar). Pregnant women should follow their Care Team instructions. Water with medications is okay. Do not drink coffee or other fluids. If you have concerns about taking  your medications, please ask at office or if  scheduling via BeVocal, send a message by clicking on Secure Messaging, Message Your Care Team.            Nov 10, 2017  8:30 AM CST   Return Visit with Santiago Escamilla MD   [facility] ([facility])    [address] [place] [address] [phone]              Who to contact     If you have questions or need follow up information about today's clinic visit or your schedule please contact [facility] directly at [phone].  Normal or non-critical lab and imaging results will be communicated to you by MyChart, letter or phone within 4 business days after the clinic has received the results. If you do not hear from us within 7 days, please contact the clinic through Receeptt or phone. If you have a critical or abnormal lab result, we will notify you by phone as soon as possible.  Submit refill requests through BeVocal or call your pharmacy and they will forward the refill request to us. Please allow 3 business days for your refill to be completed.          Additional Information About Your Visit        BeVocal Information     BeVocal gives you secure access to your electronic health record. If you see a primary care provider, you can also send messages to your care team and make appointments. If you have questions, please call your primary care clinic.  If you do not have a primary care provider, please call [phone] and they will assist you.      BeVocal is an electronic gateway that provides easy, online access to your medical records. With BeVocal, you can request a clinic appointment, read your test results, renew a prescription or communicate with your care team.     To access your existing account, please contact your [facility] or call [phone] for assistance.        Care EveryWhere ID     This is your Care EveryWhere ID. This could be used by other organizations to access your [facility]  "medical records  [id number]        Your Vitals Were     Pulse Height Last Period BMI (Body Mass Index)          81 1.619 m (5' 3.75\") 03/26/2017 (Exact Date) 34.96 kg/m2         Blood Pressure from Last 3 Encounters:   08/18/17 123/67   07/17/17 116/70   06/22/17 120/70    Weight from Last 3 Encounters:   08/18/17 91.7 kg (202 lb 1.6 oz)   07/24/17 89.4 kg (197 lb)   07/17/17 88.5 kg (195 lb)              Today, you had the following     No orders found for display       Primary Care Provider Office Phone # Fax #    Alee Duncan [phone] [address] [address]        Equal Access to Services     [facility]: Hadii rea bansal hadasho Soomaali, waaxda luqadaha, qaybta kaalmada adeegyada, [place] . So [facility] [phone].    ATENCIÓN: Si habla español, tiene a ley disposición servicios gratuitos de asistencia lingüística. Beverley al [phone].    We comply with applicable federal civil rights laws and [place] laws. We do not discriminate on the basis of race, color, national origin, age, disability sex, sexual orientation or gender identity.            Thank you!     Thank you for choosing [facility]  for your care. Our goal is always to provide you with excellent care. Hearing back from our patients is one way we can continue to improve our services. Please take a few minutes to complete the written survey that you may receive in the mail after your visit with us. Thank you!             Your Updated Medication List - Protect others around you: Learn how to safely use, store and throw away your medicines at www.disposemymeds.org.          This list is accurate as of: 8/18/17 10:16 AM.  Always use your most recent med list.                   Brand Name Dispense Instructions for use Diagnosis    prenatal multivitamin plus iron 27-0.8 MG Tabs per tablet      Take 1 tablet by mouth daily          "

## 2017-08-18 NOTE — PATIENT INSTRUCTIONS
Please schedule for the lab appointment for first week of September.     If you have any symptoms suggestive of hypothyroidism, you can do the labs earlier.

## 2017-08-18 NOTE — PROGRESS NOTES
Endocrinology Follow up note:     Lucero Ku is a pleasant 31 year old female who presents for a follow up for Hashimotos thyroiditis. Elevated TSH in June 2015 ( Delivered 3/4/1014).  However, she decided to wait prior to starting treatment.  TSH gradually improved and by January 2016 was normal. At that time, she just stopped breast feeding. She could have had post partum thyroiditis in resolving phase.     Patient was having fatigue and some cold intolerance along with constipation when the TSH was high.  But she was not sure if this was related to her thyroid condition.During the workup TPO antibody was checked and was found to be elevated at 1545.    Lucero has a diagnosis of polycystic ovarian syndrome leading to infertility.  She currently off metformin.  She had twins have her in vitro fertilization. She had a successful IVF implant and is at 20 weeks of gestation (Due in December 2017)      Her recent thyroid hormone levels for been normal  [facility] Latest Ref Rng & Units 7/25/2017 5/31/2017   TSH 0.40 - 4.00 mU/L 1.96 2.50   T4 FREE 0.76 - 1.46 ng/dL 0.83 0.98     [facility] Latest Ref Rng & Units 4/21/2017   TSH 0.40 - 4.00 mU/L 2.25   T4 FREE 0.76 - 1.46 ng/dL      Recent onset acne in face and chest that was present in the previous pregnancy as well. Currently pt notes no dysphagia or neck fullness or pain or discomfort.  No changes recently in hair growth.  No recent changes in BMs.  No changes to temp perception. No racing heart or palpitations.    PMH/ reviewed and includes the following--  Past Medical History:   Diagnosis Date     Hashimoto's thyroiditis      Infertility, female     in vitro, tiwn birth     PCOS (polycystic ovarian syndrome)        Soc Hx/ reviewed and includes the following--  Social History   Substance Use Topics     Smoking status: Never Smoker     Smokeless tobacco: Never Used      Comment: No smokers in home.     Alcohol use Yes      Comment:  "social       Fam Hx reviewed and includes the following--  Family History     Problem (# of Occurrences) Relation (Name,Age of Onset)    Anxiety Disorder (2) Sister, Brother    CANCER (3) Father (55): Stage IV Kidney (9/13), Paternal Grandmother, Paternal Grandfather    DIABETES (1) Maternal Grandfather    Hypertension (7) Mother, Father, Maternal Grandfather, Paternal Grandmother, Paternal Grandfather, Sister, Brother    Lipids (1) Maternal Grandfather: Defibrilator    Sleep Apnea (1) Sister    Thyroid Disease (3) Father: hypothyroidism, Paternal Grandmother, Paternal Grandfather          ROS: constitutional/HEENT/repro/endo reviewed completely with pertinents as above.    PE:  Gen: NAD,  /67  Pulse 81  Ht 1.619 m (5' 3.75\")  Wt 91.7 kg (202 lb 1.6 oz)  LMP 03/26/2017 (Exact Date)  BMI 34.96 kg/m2  HEENT: eomi, no scleral icteris or proptosis  Neck: s/nt.  Thyroid is palpable but without any discrete nodules.  Lymph: no cervical or supraclvicular LAD  Abd: nondistended  Ext: no tremors on outstretched hand, extremities are warm  Neuro: no resting tremor, normal gait,  nonfocal otherwise  skin: normal temp/turgor/thickness. No rashes on exposed skin.    Data:  as above    (results reviewed/discussed/explained during her visit)    A/P:   Hashimoto's thyroiditis, normal function, currently 20 weeks pregnant, twin gestation after IVF   Acne  H/O PCOS, IVF with twin gestation.     Clinically she has no symptoms of hypothyroidism.   Her TSH has been normal 1.96 in July suggesting appropriate value for gestational age.   We will retest TSH in about 6 weeks and follow closely during pregnancy.   She was made aware of the symptoms of underactive thyroid and asked to contact sooner should she have any symptoms.     Santiago Escamilla MD  [address]  Endocrinology Service    Patient Instructions   Please schedule for the lab appointment for first week of September.     If you have any symptoms suggestive of " hypothyroidism, you can do the labs earlier.              Labs: Standing labs for TFT  6 weeks labs then do in about 6-8 weeks and post partum.

## 2017-08-18 NOTE — NURSING NOTE
"Lucero Ku's goals for this visit include:   Chief Complaint   Patient presents with     Thyroid Problem       She requests these members of her care team be copied on today's visit information: Rachna Felder      PCP: Rachna Felder    Referring Provider:  No referring provider defined for this encounter.    Chief Complaint   Patient presents with     Thyroid Problem       Initial /67  Pulse 81  Ht 1.619 m (5' 3.75\")  Wt 91.7 kg (202 lb 1.6 oz)  LMP 03/26/2017 (Exact Date)  BMI 34.96 kg/m2 Estimated body mass index is 34.96 kg/(m^2) as calculated from the following:    Height as of this encounter: 1.619 m (5' 3.75\").    Weight as of this encounter: 91.7 kg (202 lb 1.6 oz).  Medication Reconciliation: complete    Do you need any medication refills at today's visit? No    Lisa Hugo LPN      "

## 2017-08-22 NOTE — TELEPHONE ENCOUNTER
Noted patient has appt with Dr. Duncan on 08-24-17.   Dr. Duncan has been out of clinic and returns on 08-24-17.  Reply sent to patient.  Will route to Dr. Duncan for review & orders. Paige Rosario RN, BAN

## 2017-08-24 ENCOUNTER — PRENATAL OFFICE VISIT (OUTPATIENT)
Dept: OBGYN | Facility: OTHER | Age: 32
End: 2017-08-24
Payer: COMMERCIAL

## 2017-08-24 VITALS
SYSTOLIC BLOOD PRESSURE: 110 MMHG | HEART RATE: 100 BPM | DIASTOLIC BLOOD PRESSURE: 60 MMHG | BODY MASS INDEX: 35.16 KG/M2 | WEIGHT: 203.25 LBS

## 2017-08-24 DIAGNOSIS — O30.042 DICHORIONIC DIAMNIOTIC TWIN PREGNANCY IN SECOND TRIMESTER: Primary | ICD-10-CM

## 2017-08-24 DIAGNOSIS — O09.819 PREGNANCY CONCEIVED THROUGH IN VITRO FERTILIZATION: ICD-10-CM

## 2017-08-24 DIAGNOSIS — O34.219 HISTORY OF CESAREAN DELIVERY AFFECTING PREGNANCY: ICD-10-CM

## 2017-08-24 PROBLEM — O30.049 DICHORIONIC DIAMNIOTIC TWIN PREGNANCY, ANTEPARTUM: Status: ACTIVE | Noted: 2017-05-24

## 2017-08-24 PROCEDURE — 99207 ZZC COMPLICATED OB VISIT: CPT | Performed by: OBSTETRICS & GYNECOLOGY

## 2017-08-24 NOTE — NURSING NOTE
"Chief Complaint   Patient presents with     Prenatal Care       Initial /60 (BP Location: Right arm, Patient Position: Chair, Cuff Size: Adult Regular)  Pulse 100  Wt 203 lb 4 oz (92.2 kg)  LMP 03/26/2017 (Exact Date)  BMI 35.16 kg/m2 Estimated body mass index is 35.16 kg/(m^2) as calculated from the following:    Height as of 8/18/17: 5' 3.75\" (1.619 m).    Weight as of this encounter: 203 lb 4 oz (92.2 kg).  Medication Reconciliation: complete     Vianey Ryan, [facility]  August 24, 2017      "

## 2017-08-24 NOTE — PROGRESS NOTES
Presents for routine  appointment.    She feels her pulse through her whole body when she is laying down.    She is having pressure if she walking for a long a period of time.  It comes and goes at times, but gets better with rest. Infrequent.  No LOF/VB/Ctxs.    ROS:   and GI negative.     Please see Prenatal Vitals and Notes Flowsheet for objective data.    A/P:  31 year old  at 21w3d       ICD-10-CM    1. Dichorionic diamniotic twin pregnancy in second trimester O30.042 Anti Treponema     Glucose tolerance, gest screen, 1 hour     OB hemoglobin     CANCELED: Anti Treponema     CANCELED: Glucose tolerance, gest screen, 1 hour     CANCELED: OB hemoglobin   2. History of  delivery affecting pregnancy O34.219    3. Pregnancy conceived through in vitro fertilization O09.819          Discussed signs and symptoms of  labor.  Supportive care discussed.  She will let us know if her symptoms worsen or do not improve.    Requesting tubal.    Discussed ultrasound results:  Marginal cord insertion B     mild pyelectasis A  ECHO tomorrow.   GCT, hgb and anti-treponema testing  after 24 weeks   Follow up in 4  weeks.      Alee Duncan MD

## 2017-08-24 NOTE — MR AVS SNAPSHOT
After Visit Summary   2017    Lucero Ku    MRN: [id number]           Patient Information     Date Of Birth          1985        Visit Information        Provider Department      2017 11:30 AM Alee Duncan MD [facility]        Today's Diagnoses     Dichorionic diamniotic twin pregnancy in second trimester    -  1    History of  delivery affecting pregnancy        Pregnancy conceived through in vitro fertilization           Follow-ups after your visit        Follow-up notes from your care team     Return in about 4 weeks (around 2017).      Your next 10 appointments already scheduled     Aug 25, 2017 11:00 AM CDT   Ech Fetal Complete* with URFETR1   [facility] ([facility])    [address] [place] [address]               Aug 25, 2017 12:00 PM CDT   Ech Fetal -Twin B Complete* with URFETR1   [facility] ([facility])    [address] [facility] [address]               Sep 05, 2017 10:30 AM CDT   LAB with NL LAB [facility] ([facility])    [address] [facility] [address] [phone]           Patient must bring picture ID. Patient should be prepared to give a urine specimen  Please do not eat 10-12 hours before your appointment if you are coming in fasting for labs on lipids, cholesterol, or glucose (sugar). Pregnant women should follow their Care Team instructions. Water with medications is okay. Do not drink coffee or other fluids. If you have concerns about taking  your medications, please ask at office or if scheduling via MoneyMenttor, send a message by clicking on Secure Messaging, Message Your Care Team.            Sep 21, 2017 11:15 AM CDT   ESTABLISHED PRENATAL with Alee Duncan MD   [facility]    [address] [facility] [id number] [phone]             Nov 10, 2017  8:30 AM CST   [facility] Visit with Santiago Esc[facility]illa MD   [facility]    [address] [address] [place] [address] [phone]              Future tests that were ordered for you today     Open Future Orders        Priority Expected Expires Ordered    Anti Treponema Routine 8/24/2017 11/24/2017 8/24/2017    Glucose tolerance, gest screen, 1 hour Routine 8/24/2017 11/24/2017 8/24/2017    OB hemoglobin Routine 8/24/2017 11/24/2017 8/24/2017            Who to contact     If you have questions or need follow up information about today's clinic visit or your schedule please contact [facility] directly at [phone].  Normal or non-critical lab and imaging results will be communicated to you by MainOnehart, letter or phone within 4 business days after the clinic has received the results. If you do not hear from us within 7 days, please contact the clinic through MainOnehart or phone. If you have a critical or abnormal lab result, we will notify you by phone as soon as possible.  Submit refill requests through Biotectix or call your pharmacy and they will forward the refill request to us. Please allow 3 business days for your refill to be completed.          Additional Information About Your Visit        MainOnehart Information     Biotectix gives you secure access to your electronic health record. If you see a primary care provider, you can also send messages to your care team and make appointments. If you have questions, please call your primary care clinic.  If you do not have a primary care provider, please call [phone] and they will assist you.        Care EveryWhere ID     This is your Care EveryWhere ID. This could be used by other organizations to access your [facility] medical records  [id number]        Your Vitals Were     Pulse Last Period BMI (Body Mass Index)             100 03/26/2017 (Exact Date) 35.16 kg/m2          Blood  Pressure from Last 3 Encounters:   08/24/17 110/60   08/18/17 123/67   07/17/17 116/70    Weight from Last 3 Encounters:   08/24/17 203 lb 4 oz (92.2 kg)   08/18/17 202 lb 1.6 oz (91.7 kg)   07/24/17 197 lb (89.4 kg)               Primary Care Provider Office Phone # Fax #    Alee Duncan [phone]       [address] [address]        Equal Access to Services     [facility]: Hadii aad ku hadasho Soomaali, waaxda luqadaha, qaybta kaalmada adeegyada, waxvanessa hobson [place]ma[place] . So [facility] [phone].    ATENCIÓN: Si habla español, tiene a ley disposición servicios gratuitos de asistencia lingüística. Llame al [phone].    We comply with applicable federal civil rights laws and [place] laws. We do not discriminate on the basis of race, color, national origin, age, disability sex, sexual orientation or gender identity.            Thank you!     Thank you for choosing [facility]  for your care. Our goal is always to provide you with excellent care. Hearing back from our patients is one way we can continue to improve our services. Please take a few minutes to complete the written survey that you may receive in the mail after your visit with us. Thank you!             Your Updated Medication List - Protect others around you: Learn how to safely use, store and throw away your medicines at www.disposemymeds.org.          This list is accurate as of: 8/24/17  1:21 PM.  Always use your most recent med list.                   Brand Name Dispense Instructions for use Diagnosis    prenatal multivitamin plus iron 27-0.8 MG Tabs per tablet      Take 1 tablet by mouth daily

## 2017-08-25 ENCOUNTER — HOSPITAL ENCOUNTER (OUTPATIENT)
Dept: CARDIOLOGY | Facility: CLINIC | Age: 32
End: 2017-08-25
Payer: COMMERCIAL

## 2017-08-25 ENCOUNTER — HOSPITAL ENCOUNTER (OUTPATIENT)
Dept: CARDIOLOGY | Facility: CLINIC | Age: 32
Discharge: HOME | End: 2017-08-25
Payer: COMMERCIAL

## 2017-08-25 DIAGNOSIS — O30.041 TWIN PREGNANCY, DICHORIONIC/DIAMNIOTIC, FIRST TRIMESTER: ICD-10-CM

## 2017-08-25 PROCEDURE — 76825 ECHO EXAM OF FETAL HEART: CPT

## 2017-08-28 ENCOUNTER — MYC MEDICAL ADVICE (OUTPATIENT)
Dept: OBGYN | Facility: OTHER | Age: 32
End: 2017-08-28

## 2017-09-01 ENCOUNTER — THERAPY VISIT (OUTPATIENT)
Dept: CHIROPRACTIC MEDICINE | Facility: CLINIC | Age: 32
End: 2017-09-01
Payer: COMMERCIAL

## 2017-09-01 DIAGNOSIS — M54.9 MECHANICAL BACK PAIN: ICD-10-CM

## 2017-09-01 DIAGNOSIS — M99.03 SEGMENTAL DYSFUNCTION OF LUMBAR REGION: Primary | ICD-10-CM

## 2017-09-01 DIAGNOSIS — M99.04 SEGMENTAL DYSFUNCTION OF SACRAL REGION: ICD-10-CM

## 2017-09-01 DIAGNOSIS — M99.02 SEGMENTAL DYSFUNCTION OF THORACIC REGION: ICD-10-CM

## 2017-09-01 PROCEDURE — 98941 CHIROPRACT MANJ 3-4 REGIONS: CPT | Mod: AT | Performed by: CHIROPRACTOR

## 2017-09-01 PROCEDURE — 99203 OFFICE O/P NEW LOW 30 MIN: CPT | Mod: 25 | Performed by: CHIROPRACTOR

## 2017-09-01 NOTE — PROGRESS NOTES
Initial Chiropractic Clinic Visit    PCP: Alee Duncan [place] is a 31 year old female who is seen as a self referral presenting with lower back pain that began about 2 months ago as she is 23 weeks pregnant with her second set of twins. She started having mid to lower back pain about 2 months ago, and it is mostly when she is sitting or laying. When she is doing stairs, she hears a lot of popping in her back. One of the twins is high and facing posterior. She is fine when she is standing. She had no LBP with her first set of twins. The pain is across the TL region, bilaterally. The pain is achy, and feels like it needs to crack. She lays on the hard floor and can get it to crack. She has not taken anything for pain. She has just been told to use heat on her back. She takes awhile to fall asleep but then she sleeps well once she is asleep. The pain is rated 4/10 today, better now, worse with sitting down. She has 3 year old twin boys at home, also. She denies radiation in her LE. She has never been to a DC before.       Injury: Denies    Location of Pain: bilateral across TL region   Duration of Pain: 2 month(s)  Rating of Pain at worst: 6/10  Rating of Pain Currently: 4/10  Symptoms are better with: Nothing  Symptoms are worse with: sitting  Additional Features: 23 weeks PG with twins     Health History  as reported by the patient:    How does the patient rate their own health:   Good    Current or past medical history:   Currently pregnant and Thyroid problems (Hasimoto's with pregnancy)    Medical allergies:  Hydrocodone, acetominiphen    Past Traumas/Surgeries:   2014  Right elbow surgery   Tubes in ears this year    Family History:  HTN in parents  Osteoporosis in mother  Grave's Disease in father  Cancer in kidney in father who is   Sister/Brother HTN, anxiety    Medications:  PNV    Occupation:  , PT    Primary job tasks:   Prolonged standing    Barriers as  home/work:   Work has been difficult          Lucero was asked to complete the Oswestry Low Back Disability Index and Dianna Start Back screening tool today in the office.  Disability score: 14%. Keel Start Total Score:2 Sub Score: 0     Review of Systems  Musculoskeletal: as above  Remainder of review of systems is negative including constitutional, CV, pulmonary, GI, Skin and Neurologic except as noted in HPI or medical history.    Past Medical History:   Diagnosis Date     Hashimoto's thyroiditis      Infertility, female     in vitro, tiwn birth     PCOS (polycystic ovarian syndrome)      Past Surgical History:   Procedure Laterality Date      SECTION  3/4/2014    Procedure:  SECTION;;  Surgeon: Paige yL MD;  Location: [facility]     ELBOW SURGERY      Right elbow     HC TOOTH EXTRACTION W/FORCEP       HYSTEROSCOPY DIAGNOSTIC  2011     Objective  LMP 2017 (Exact Date)      GENERAL APPEARANCE: healthy, alert and no distress   GAIT: 23 weeks pregnant  SKIN: no suspicious lesions or rashes  NEURO: Normal strength and tone, mentation intact and speech normal  PSYCH:  mentation appears normal and affect normal/bright    Low back exam:    Inspection:       no visible deformity in the low back    ROM:       WNL for 23 weeks PG    Tender:  Lower thoracic spine across bilaterally      Strength:       ankle dorsiflexion 5/5       ankle plantarflexion 5/5       dorsiflexion of the great toe 5/5    Reflexes:       patellar (L3, L4) symmetric normal       achilles tendons (S1) symmetric normal    Sensation:      grossly intact throughout lower extremities    Special tests:  Milgrams - negative, Valsalva - negative, Kemps - Right negative and Left negative, SLR - Right negative and Left negative, Gaenslen's - Right negative and Left negative, Fabere - Right negative and Left negative, Yeoman's - Right negative and Left negative, Norman - Right negative and Left negative and Ely's - Right negative  and Left negative    Segmental spinal dysfunction/restrictions found at:T1 RR, LRR  T6 E, FR  T10 E, FR  L4 LR, RRR  PSIS Right posterior, restricted P to  A.        Muscle spasm found in:Lumbar erector spine and T-spine paraspinal      Radiology:  Contraindicated during pregnancy.    Assessment:    No diagnosis found.    RX ordered/plan of care: Mechanical back pain due to pregnancy, with associated myospasm and intersegmental dysfunction.  Anticipated outcomes: Patient is expected to respond favorably to conservative management.   Possible risks and side effects: Minimal soreness expected post-adjustment.     After discussing the risk and benefits of care, patient consented to treatment.    Prognosis: Good      Patient's condition:  Patient had restrictions pre-manipulation    Treatment effectiveness:  Post manipulation there is better intersegmental movement and Patient claims to feel looser post manipulation      Plan:    Procedures:  Evaluation and Management:  16184 Moderate level exam 30 min    CMT:  17314 Chiropractic manipulative treatment 3-4 regions performed   Thoracic: Diversified, T1, T6, T10, Prone  Lumbar: Drop Table, L4, Prone  Pelvis: Drop Table, PSIS Right , Prone    Modalities:  38368: MSTM:  To Lumbar erector spine and T-spine paraspinal  for 5 min    Therapeutic procedures:  Gave ice instructions post-adjustment.         Treatment plan and goals:  Goals:  Decrease pain from 4/10 to 2/10 in 4 treatments.  Maintain healthy pregnancy.  Be able to continue to work without pain.    Frequency of care  Duration of care is estimated to be 12 weeks, from the initial treatment.  It is estimated that the patient will need a total of 8 visits to resolve this episode.  For the initial therapeutic trial of care, the frequency is recommended at once per week.  A reevaluation would be clinically appropriate in 8 visits, to determine progress and further course of care.    In-Office  Treatment  Evaluation  Spinal Chiropractic Manipulative Therapy:  Trial of care - re-evaluate after 8 visits.         Recommendations:    Instructions:ice 20 minutes every other hour as needed    Follow-up:  Return to care next week.       Discussed the assessment with the patient.      Disclaimer: This note consists of symbols derived from keyboarding, dictation and/or voice recognition software. As a result, there may be errors in the script that have gone undetected. Please consider this when interpreting information found in this chart.

## 2017-09-01 NOTE — MR AVS SNAPSHOT
After Visit Summary   9/1/2017    Lucero Ku    MRN: [id number]           Patient Information     Date Of Birth          1985        Visit Information        Provider Department      9/1/2017 9:30 AM Josephine Ramos DC [facility]        Today's Diagnoses     Segmental dysfunction of lumbar region    -  1    Segmental dysfunction of sacral region        Segmental dysfunction of thoracic region        Mechanical back pain           Follow-ups after your visit        Your next 10 appointments already scheduled     Sep 05, 2017 10:30 AM CDT   LAB with NL LAB [facility] ([facility])    [address] [address] [id number] [phone]           Patient must bring picture ID. Patient should be prepared to give a urine specimen  Please do not eat 10-12 hours before your appointment if you are coming in fasting for labs on lipids, cholesterol, or glucose (sugar). Pregnant women should follow their Care Team instructions. Water with medications is okay. Do not drink coffee or other fluids. If you have concerns about taking  your medications, please ask at office or if scheduling via [employer], send a message by clicking on Secure Messaging, Message Your Care Team.            Sep 08, 2017  9:00 AM CDT   [facility] Chiropractor with Josephine Ramos DC   [facility]    [address] [id number] [phone]            Sep 21, 2017 11:15 AM CDT   ESTABLISHED PRENATAL with Alee Duncan MD   [facility]    [address] [facility] [id number] [phone]            Nov 10, 2017  8:30 AM CST   Return Visit with Santiago Escamilla MD   [facility]    [address] [address] [place] [address] [phone]              Who to contact     If you have questions  or need follow up information about today's clinic visit or your schedule please contact [facility] directly at [phone].  Normal or non-critical lab and imaging results will be communicated to you by MyChart, letter or phone within 4 business days after the clinic has received the results. If you do not hear from us within 7 days, please contact the clinic through [employer] or phone. If you have a critical or abnormal lab result, we will notify you by phone as soon as possible.  Submit refill requests through [employer]epLike! or call your pharmacy and they will forward the refill request to us. Please allow 3 business days for your refill to be completed.          Additional Information About Your Visit        [employer] Information     YepLike! gives you secure access to your electronic health record. If you see a primary care provider, you can also send messages to your care team and make appointments. If you have questions, please call your primary care clinic.  If you do not have a primary care provider, please call [phone] and they will assist you.        Care EveryWhere ID     This is your Care EveryWhere ID. This could be used by other organizations to access your [facility] medical records  [id number]        Your Vitals Were     Last Period                   03/26/2017 (Exact Date)            Blood Pressure from Last 3 Encounters:   08/24/17 110/60   08/18/17 123/67   07/17/17 116/70    Weight from Last 3 Encounters:   08/24/17 92.2 kg (203 lb 4 oz)   08/18/17 91.7 kg (202 lb 1.6 oz)   07/24/17 89.4 kg (197 lb)              We Performed the Following     CHIROPRAC MANIP,SPINAL,3-4 REGIONS     OFFICE/OUTPT VISIT,NEWLEVL III        Primary Care Provider Office Phone # Fax #    Alee Duncan [phone] [address] [address]        Equal Access to Services     DARYL JUNG : Maria Guadalupe Novoa, marita wolf, qabrandie plasencia,  annette buitrago greg salgado'aan ah. So [facility] [phone].    ATENCIÓN: Si habla ty, tiene a ley disposición servicios gratuitos de asistencia lingüística. Beverley al [phone].    We comply with applicable federal civil rights laws and [place] laws. We do not discriminate on the basis of race, color, national origin, age, disability sex, sexual orientation or gender identity.            Thank you!     Thank you for choosing [facility]  for your care. Our goal is always to provide you with excellent care. Hearing back from our patients is one way we can continue to improve our services. Please take a few minutes to complete the written survey that you may receive in the mail after your visit with us. Thank you!             Your Updated Medication List - Protect others around you: Learn how to safely use, store and throw away your medicines at www.disposemymeds.org.          This list is accurate as of: 9/1/17  9:59 AM.  Always use your most recent med list.                   Brand Name Dispense Instructions for use Diagnosis    prenatal multivitamin plus iron 27-0.8 MG Tabs per tablet      Take 1 tablet by mouth daily

## 2017-09-05 DIAGNOSIS — E03.9 ACQUIRED HYPOTHYROIDISM: ICD-10-CM

## 2017-09-05 LAB
T4 FREE SERPL-MCNC: 0.69 NG/DL (ref 0.76–1.46)
TSH SERPL DL<=0.005 MIU/L-ACNC: 1.58 MU/L (ref 0.4–4)

## 2017-09-05 PROCEDURE — 84439 ASSAY OF FREE THYROXINE: CPT | Performed by: INTERNAL MEDICINE

## 2017-09-05 PROCEDURE — 36415 COLL VENOUS BLD VENIPUNCTURE: CPT | Performed by: INTERNAL MEDICINE

## 2017-09-05 PROCEDURE — 84443 ASSAY THYROID STIM HORMONE: CPT | Performed by: INTERNAL MEDICINE

## 2017-09-08 ENCOUNTER — THERAPY VISIT (OUTPATIENT)
Dept: CHIROPRACTIC MEDICINE | Facility: CLINIC | Age: 32
End: 2017-09-08
Payer: COMMERCIAL

## 2017-09-08 DIAGNOSIS — M54.9 MECHANICAL BACK PAIN: ICD-10-CM

## 2017-09-08 DIAGNOSIS — M99.02 SEGMENTAL DYSFUNCTION OF THORACIC REGION: ICD-10-CM

## 2017-09-08 DIAGNOSIS — M99.04 SEGMENTAL DYSFUNCTION OF SACRAL REGION: ICD-10-CM

## 2017-09-08 DIAGNOSIS — M99.03 SEGMENTAL DYSFUNCTION OF LUMBAR REGION: ICD-10-CM

## 2017-09-08 PROCEDURE — 98941 CHIROPRACT MANJ 3-4 REGIONS: CPT | Mod: AT | Performed by: CHIROPRACTOR

## 2017-09-08 NOTE — PROGRESS NOTES
Visit #:  2 of 8 based on treatment plan 9/1/2017    Subjective:  Lucero Ku is a 31 year old female who is seen in f/u up for:        Segmental dysfunction of lumbar region  Segmental dysfunction of thoracic region  Mechanical back pain  Segmental dysfunction of sacral region.     Since last visit on 9/1/2017,  Lucero Ku reports the following changes: Patient presents and states that she is doing good. She notes that she was a little sore in her neck after her first adjustment. She felt relief afterwards. Currently, she feels pain in her neck and shoulders, but she worked the last 2 long days. She rates her current pain 4/10. She points to pain in her left upper traps.         Objective:  The following was observed:    P: pain elicited on palpation, bilateral upper traps    A: static palpation demonstrates intersegmental asymmetry, as noted    R: motion palpation notes restricted motion    T: localized muscle spasm at: Traps Bilaterally      Assessment:    Segmental spinal dysfunction/restrictions found at:  T1 LR, RRR  T5 E, FR  T10 E, FR  L4 LR, RRR  Right SI posterior      Diagnoses:      1. Segmental dysfunction of lumbar region    2. Segmental dysfunction of thoracic region    3. Mechanical back pain    4. Segmental dysfunction of sacral region        Patient's condition:  Patient had restrictions pre-manipulation    Treatment effectiveness:  Post manipulation there is better intersegmental movement and Patient claims to feel looser post manipulation      Procedures:  CMT:  76726 Chiropractic manipulative treatment 3-4 regions performed   Thoracic: Mobilization, T1, T5, T10, Prone  Lumbar: Drop Table, L4, Drop  Pelvis: Drop Table, PSIS Right , Prone    Modalities:  22042: MSTM:  To Traps  for 5 min    Therapeutic procedures:  None      Prognosis: Good    Progress towards Goals:    Decrease pain from 4/10 to 2/10 in 4 treatments.  Maintain healthy pregnancy.  Be able to continue to work without  pain.    Response to Treatment:   Reduction of symptoms overall with first treatment      Recommendations:    Instructions:ice 20 minutes every other hour as needed    Follow-up:  Return to care in 1 week.

## 2017-09-08 NOTE — MR AVS SNAPSHOT
After Visit Summary   9/8/2017    Lucero Ku    MRN: [id number]           Patient Information     Date Of Birth          1985        Visit Information        Provider Department      9/8/2017 9:00 AM Josephine Ramos DC [facility]        Today's Diagnoses     Segmental dysfunction of lumbar region        Segmental dysfunction of thoracic region        Mechanical back pain        Segmental dysfunction of sacral region           Follow-ups after your visit        Your next 10 appointments already scheduled     Sep 21, 2017 11:15 AM CDT   ESTABLISHED PRENATAL with Alee Duncan MD   [facility] ([facility])    [address] [facility] [address]   [phone]            Nov 10, 2017  8:30 AM CST   Return Visit with Santiago Escamilla MD   [facility] ([facility])    [address] [place] [address] [phone]              Who to contact     If you have questions or need follow up information about today's clinic visit or your schedule please contact [facility] directly at [phone].  Normal or non-critical lab and imaging results will be communicated to you by [employer], letter or phone within 4 business days after the clinic has received the results. If you do not hear from us within 7 days, please contact the clinic through [employer] or phone. If you have a critical or abnormal lab result, we will notify you by phone as soon as possible.  Submit refill requests through [employer]ingu[employer]Leo or call your pharmacy and they will forward the refill request to us. Please allow 3 business days for your refill to be completed.          Additional Information About Your Visit        MyChart Information     [employer] gives you secure access to your electronic health record. If you see a primary care provider, you can also send messages to your care team and make  appointments. If you have questions, please call your primary care clinic.  If you do not have a primary care provider, please call [phone] and they will assist you.        Care EveryWhere ID     This is your Care EveryWhere ID. This could be used by other organizations to access your [facility] medical records  [id number]        Your Vitals Were     Last Period                   03/26/2017 (Exact Date)            Blood Pressure from Last 3 Encounters:   08/24/17 110/60   08/18/17 123/67   07/17/17 116/70    Weight from Last 3 Encounters:   08/24/17 92.2 kg (203 lb 4 oz)   08/18/17 91.7 kg (202 lb 1.6 oz)   07/24/17 89.4 kg (197 lb)              We Performed the Following     CHIROPRAC MANIP,SPINAL,3-4 REGIONS        Primary Care Provider Office Phone # Fax #    Alee Duncan [phone] [address] [place] [address]        Equal Access to Services     BETHANY [facility] : Hadii aad ku hadasho Soomaali, waaxda luqadaha, qaybta kaalmada adeegyada, waxay idiin hayaan adeeg kharash la'clementn . So [facility] [phone].    ATENCIÓN: Si habla español, tiene a ley disposición servicios gratuitos de asistencia lingüística. Llame al [phone].    We comply with applicable federal civil rights laws and [place] laws. We do not discriminate on the basis of race, color, national origin, age, disability sex, sexual orientation or gender identity.            Thank you!     Thank you for choosing [facility]  for your care. Our goal is always to provide you with excellent care. Hearing back from our patients is one way we can continue to improve our services. Please take a few minutes to complete the written survey that you may receive in the mail after your visit with us. Thank you!             Your Updated Medication List - Protect others around you: Learn how to safely use, store and throw away your medicines at www.disposemymeds.org.          This list is accurate as of:  9/8/17  9:18 AM.  Always use your most recent med list.                   Brand Name Dispense Instructions for use Diagnosis    prenatal multivitamin plus iron 27-0.8 MG Tabs per tablet      Take 1 tablet by mouth daily

## 2017-09-12 DIAGNOSIS — E03.9 ACQUIRED HYPOTHYROIDISM: ICD-10-CM

## 2017-09-12 LAB
T4 SERPL-MCNC: 10.2 UG/DL (ref 4.5–13.9)
TSH SERPL DL<=0.005 MIU/L-ACNC: 1.84 MU/L (ref 0.4–4)

## 2017-09-12 PROCEDURE — 84436 ASSAY OF TOTAL THYROXINE: CPT | Performed by: INTERNAL MEDICINE

## 2017-09-12 PROCEDURE — 84443 ASSAY THYROID STIM HORMONE: CPT | Performed by: INTERNAL MEDICINE

## 2017-09-12 PROCEDURE — 36415 COLL VENOUS BLD VENIPUNCTURE: CPT | Performed by: INTERNAL MEDICINE

## 2017-09-13 ENCOUNTER — MYC MEDICAL ADVICE (OUTPATIENT)
Dept: OBGYN | Facility: OTHER | Age: 32
End: 2017-09-13

## 2017-09-13 DIAGNOSIS — M99.02 SEGMENTAL DYSFUNCTION OF THORACIC REGION: ICD-10-CM

## 2017-09-13 DIAGNOSIS — M99.03 SEGMENTAL DYSFUNCTION OF LUMBAR REGION: ICD-10-CM

## 2017-09-13 DIAGNOSIS — M99.04 SEGMENTAL DYSFUNCTION OF SACRAL REGION: ICD-10-CM

## 2017-09-13 DIAGNOSIS — M54.9 MECHANICAL BACK PAIN: ICD-10-CM

## 2017-09-14 ENCOUNTER — TELEPHONE (OUTPATIENT)
Dept: ENDOCRINOLOGY | Facility: CLINIC | Age: 32
End: 2017-09-14

## 2017-09-14 DIAGNOSIS — R94.6 BORDERLINE ABNORMAL TFTS: Primary | ICD-10-CM

## 2017-09-14 NOTE — TELEPHONE ENCOUNTER
Left a message for patient to call back at [phone] and ask for Sushma in women's.  Sushma Hilton RN

## 2017-09-14 NOTE — TELEPHONE ENCOUNTER
Patient called back and is aware of her 10 am appointment with lab on 9/21/2017 and then follow up with Dr. Duncan after.   She stated she can feel pressure and movement of the babies low in her pelvis area.  She did purchase a maternity belt and is waiting for it to be delivered.  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   She denied antoine washnigton contractions,vaginal bleeding,leaking fluids and the babies are very active. She has a follow up appointment tomorrow with PROSPER.  I suggested she discuss this pressure with the perinatologist.  If she has any concerns, I advised she call the clinic back.  Sushma Hilton RN

## 2017-09-14 NOTE — TELEPHONE ENCOUNTER
Mychart message responded to patient about glucose test.    See other concerns.    Yeimy Teresa, [facility]

## 2017-09-14 NOTE — PROGRESS NOTES
Dear Lucero,     The total T4 hormone levels as well as TSH was normal. Retest in about 6 weeks to keep monitoring this closely.     Best regards  Santiago Escamilla MD  [address]  Endocrinology Service

## 2017-09-15 ENCOUNTER — TRANSFERRED RECORDS (OUTPATIENT)
Dept: HEALTH INFORMATION MANAGEMENT | Facility: CLINIC | Age: 32
End: 2017-09-15

## 2017-09-21 ENCOUNTER — TELEPHONE (OUTPATIENT)
Dept: OBGYN | Facility: OTHER | Age: 32
End: 2017-09-21

## 2017-09-21 ENCOUNTER — PRENATAL OFFICE VISIT (OUTPATIENT)
Dept: OBGYN | Facility: OTHER | Age: 32
End: 2017-09-21
Payer: COMMERCIAL

## 2017-09-21 VITALS
DIASTOLIC BLOOD PRESSURE: 60 MMHG | WEIGHT: 211.5 LBS | BODY MASS INDEX: 36.59 KG/M2 | SYSTOLIC BLOOD PRESSURE: 120 MMHG | HEART RATE: 80 BPM

## 2017-09-21 DIAGNOSIS — O34.219 HISTORY OF CESAREAN DELIVERY AFFECTING PREGNANCY: ICD-10-CM

## 2017-09-21 DIAGNOSIS — O30.042 DICHORIONIC DIAMNIOTIC TWIN PREGNANCY IN SECOND TRIMESTER: Primary | ICD-10-CM

## 2017-09-21 DIAGNOSIS — O30.042 DICHORIONIC DIAMNIOTIC TWIN PREGNANCY IN SECOND TRIMESTER: ICD-10-CM

## 2017-09-21 LAB
GLUCOSE 1H P 50 G GLC PO SERPL-MCNC: 114 MG/DL (ref 60–129)
HGB BLD-MCNC: 11.3 G/DL (ref 11.7–15.7)

## 2017-09-21 PROCEDURE — 86780 TREPONEMA PALLIDUM: CPT | Performed by: OBSTETRICS & GYNECOLOGY

## 2017-09-21 PROCEDURE — 99207 ZZC COMPLICATED OB VISIT: CPT | Performed by: OBSTETRICS & GYNECOLOGY

## 2017-09-21 PROCEDURE — 36415 COLL VENOUS BLD VENIPUNCTURE: CPT | Performed by: OBSTETRICS & GYNECOLOGY

## 2017-09-21 PROCEDURE — 82950 GLUCOSE TEST: CPT | Performed by: OBSTETRICS & GYNECOLOGY

## 2017-09-21 PROCEDURE — 00000218 ZZHCL STATISTIC OBHBG - HEMOGLOBIN: Performed by: OBSTETRICS & GYNECOLOGY

## 2017-09-21 NOTE — TELEPHONE ENCOUNTER
Lucero Ku  1985  Gender: Female  Phone: [phone] (home)      Pre-operative diagnosis: No diagnosis found.  Surgical procedure:  Repeat  section with bilateral partial salpingectomy  Special equipment: None    Anesthesia: Spinal  Position:  Supine  Latex Allergy: No  VRE or MRSA or Other Precautions: None  Initiate Pre-op orders for above procedure: Yes as ordered in Epic.   Additional orders noted there also.   Location for Surgery: [facility].   Consent signed: Not yet.     Sterilization or Hysterectomy consent signed in advance: N/A  Date sterilization consent signed for Med Assistance pt: Not a medical assist patient.  Surgeon: Perla MILLARD assistant: No  Physician assistant: No  Operating room  requested: Yes    Electronically signed by: Perla       2017          1:44 PM   There is no height or weight on file to calculate BMI.  (Cut off for ASC  is 45)   Surgery Date: plan for 17 if possible (930 am if open).  I am on call so do not schedule right at 7:30.    Estimated Case Length: 1 hour(s).  Scheduled as: Day of Surgery Admit    Other Special Preparations: None.  Pre-anesthetic medical evaluation: at future OB visit   Post op appointment needed:  6 week PP visit

## 2017-09-21 NOTE — PROGRESS NOTES
Presents for routine  appointment.    Pressure after a long day on her feet.  No new symptoms.    No LOF/VB/Ctxs.    ROS:   and GI  negative.     Please see Prenatal Vitals and Notes Flowsheet for objective data.  Normal ECHOs since I saw her last.    US at [facility] in  9/15/17    Fetus A maternal right, presenting.  EFW 52%.  Renal pelvis 8.6 mm    Fetus B maternal left.  EFW 48%.  Marginal cord insertion      A/P:  31 year old  at 25w3d       ICD-10-CM    1. Dichorionic diamniotic twin pregnancy in second trimester O30.042    2. History of  delivery affecting pregnancy O34.219        Repeat US in 4 weeks at M for growth and  to eval A renal pelvis.    Schedule surgery for 38 weeks.  Will adjust as needed.    1 hr glucola today.  She does not have access to MyChart.  She is Rh Positive   TDAP  next visit.    Discussed signs and symptoms of  labor  Follow up in 4  weeks.      Alee Duncan MD

## 2017-09-21 NOTE — NURSING NOTE
"Chief Complaint   Patient presents with     Prenatal Care       Initial /60 (BP Location: Left arm, Patient Position: Chair, Cuff Size: Adult Regular)  Pulse 80  Wt 211 lb 8 oz (95.9 kg)  LMP 03/26/2017 (Exact Date)  BMI 36.59 kg/m2 Estimated body mass index is 36.59 kg/(m^2) as calculated from the following:    Height as of 8/18/17: 5' 3.75\" (1.619 m).    Weight as of this encounter: 211 lb 8 oz (95.9 kg).  Medication Reconciliation: complete     Vianey Ryan, [facility]  September 21, 2017      "

## 2017-09-21 NOTE — MR AVS SNAPSHOT
After Visit Summary   2017    Lucero Ku    MRN: [id number]           Patient Information     Date Of Birth          1985        Visit Information        Provider Department      2017 11:15 AM Alee Duncan MD [facility]        Today's Diagnoses     Dichorionic diamniotic twin pregnancy in second trimester    -  1    History of  delivery affecting pregnancy           Follow-ups after your visit        Follow-up notes from your care team     Return in about 4 weeks (around 10/19/2017) for OB Visit.      Your next 10 appointments already scheduled     Sep 22, 2017  9:00 AM CDT   DELMY Chiropractor with Josephine Ramos DC   [facility]    [address] [facility] [address] [address] [phone]            Oct 16, 2017 10:00 AM CDT   ESTABLISHED PRENATAL with [facility]lee Duncan MD   [facility]    [address] [place] [id number] [phone]            Nov 10, 2017  8:30 AM CST   Return Visit with Santiago Escamilla MD   [facility]    [address] [place] [address] [phone]              Who to contact     If you have questions or need follow up information about today's clinic visit or your schedule please contact [facility] directly at [phone].  Normal or non-critical lab and imaging results will be communicated to you by MyChart, letter or phone within 4 business days after the clinic has received the results. If you do not hear from us within 7 days, please contact the clinic through MyChart or phone. If you have a critical or abnormal lab result, we will notify you by phone as soon as possible.  Submit refill requests through [employer] or call your pharmacy and they will forward the refill request to us. Please allow 3 business days for your refill to be  completed.          Additional Information About Your Visit        MyChart Information     coresystems gives you secure access to your electronic health record. If you see a primary care provider, you can also send messages to your care team and make appointments. If you have questions, please call your primary care clinic.  If you do not have a primary care provider, please call [phone] and they will assist you.        Care EveryWhere ID     This is your Care EveryWhere ID. This could be used by other organizations to access your [place] medical records  [id number]        Your Vitals Were     Pulse Last Period BMI (Body Mass Index)             80 03/26/2017 (Exact Date) 36.59 kg/m2          Blood Pressure from Last 3 Encounters:   09/21/17 120/60   08/24/17 110/60   08/18/17 123/67    Weight from Last 3 Encounters:   09/21/17 211 lb 8 oz (95.9 kg)   08/24/17 203 lb 4 oz (92.2 kg)   08/18/17 202 lb 1.6 oz (91.7 kg)              Today, you had the following     No orders found for display       Primary Care Provider Office Phone # Fax #    Alee Duncan [phone]       [facility] [address]        Equal Access to Services     DARYL JUNG AH: Hadii aad ku hadasho Soomaali, waaxda luqadaha, qaybta kaalmada adeegyada, annette mcintyrein hayclementn greg hayes lamc schmitz. So [facility] [phone].    ATENCIÓN: Si habla español, tiene a ely disposición servicios gratuitos de asistencia lingüística. Llame al [phone].    We comply with applicable federal civil rights laws and [place] laws. We do not discriminate on the basis of race, color, national origin, age, disability sex, sexual orientation or gender identity.            Thank you!     Thank you for choosing [facility]  for your care. Our goal is always to provide you with excellent care. Hearing back from our patients is one way we can continue to improve our services. Please take a few minutes to complete the written  survey that you may receive in the mail after your visit with us. Thank you!             Your Updated Medication List - Protect others around you: Learn how to safely use, store and throw away your medicines at www.disposemymeds.org.          This list is accurate as of: 9/21/17  1:42 PM.  Always use your most recent med list.                   Brand Name Dispense Instructions for use Diagnosis    prenatal multivitamin plus iron 27-0.8 MG Tabs per tablet      Take 1 tablet by mouth daily

## 2017-09-22 ENCOUNTER — THERAPY VISIT (OUTPATIENT)
Dept: CHIROPRACTIC MEDICINE | Facility: CLINIC | Age: 32
End: 2017-09-22
Payer: COMMERCIAL

## 2017-09-22 DIAGNOSIS — M99.03 SEGMENTAL DYSFUNCTION OF LUMBAR REGION: ICD-10-CM

## 2017-09-22 DIAGNOSIS — M99.02 SEGMENTAL DYSFUNCTION OF THORACIC REGION: ICD-10-CM

## 2017-09-22 DIAGNOSIS — M99.04 SEGMENTAL DYSFUNCTION OF SACRAL REGION: ICD-10-CM

## 2017-09-22 DIAGNOSIS — M54.9 MECHANICAL BACK PAIN: ICD-10-CM

## 2017-09-22 LAB — T PALLIDUM IGG+IGM SER QL: NEGATIVE

## 2017-09-22 PROCEDURE — 98941 CHIROPRACT MANJ 3-4 REGIONS: CPT | Mod: AT | Performed by: CHIROPRACTOR

## 2017-09-22 NOTE — PROGRESS NOTES
Visit #:  3 of 8 based on treatment plan 9/1/2017    Subjective:  Lucero Ku is a 31 year old female who is seen in f/u up for:        Segmental dysfunction of lumbar region  Segmental dysfunction of thoracic region  Mechanical back pain  Segmental dysfunction of sacral region.     Since last visit on 9/8/2017,  Lucero Ku reports the following changes: Patient presents and states that she feels like she has really improved. She has been wearing a support belt at work which has really helped. She feels pelvic pressure, but that is all today.          Objective:  The following was observed:    P: pain elicited on palpation, bilateral upper traps    A: static palpation demonstrates intersegmental asymmetry, as noted    R: motion palpation notes restricted motion    T: localized muscle spasm at: Traps Bilaterally      Assessment:    Segmental spinal dysfunction/restrictions found at:  T1 RR, LRR  T5 E, FR  T10 E, FR  L4 LR, RRR  Right SI posterior      Diagnoses:      1. Segmental dysfunction of lumbar region    2. Segmental dysfunction of thoracic region    3. Mechanical back pain    4. Segmental dysfunction of sacral region        Patient's condition:  Patient had restrictions pre-manipulation    Treatment effectiveness:  Post manipulation there is better intersegmental movement and Patient claims to feel looser post manipulation      Procedures:  CMT:  78219 Chiropractic manipulative treatment 3-4 regions performed   Thoracic: Mobilization, T1, T5, T10, Prone  Lumbar: Drop Table, L4, Drop  Pelvis: Drop Table, PSIS Right , Prone    Modalities:  10238: MSTM:  To Traps  for 5 min    Therapeutic procedures:  None      Prognosis: Good    Progress towards Goals:    Decrease pain from 4/10 to 2/10 in 4 treatments.  Maintain healthy pregnancy.  Be able to continue to work without pain.    Response to Treatment:   Reduction of symptoms overall with care.      Recommendations:    Instructions:ice 20 minutes  every other hour as needed    Follow-up:  Return to care as needed.

## 2017-09-22 NOTE — MR AVS SNAPSHOT
After Visit Summary   9/22/2017    Lucero Ku    MRN: [id number]           Patient Information     Date Of Birth          1985        Visit Information        Provider Department      9/22/2017 9:00 AM Josephine Ramos DC [facility]        Today's Diagnoses     Segmental dysfunction of lumbar region        Segmental dysfunction of thoracic region        Mechanical back pain        Segmental dysfunction of sacral region           Follow-ups after your visit        Your next 10 appointments already scheduled     Oct 16, 2017 10:00 AM CDT   [facility]ABL[facility]    [address] [facility] [address]   [phone]            Nov 10, 2017  8:30 AM CST   Return Visit with Santiago Escamilla MD   [facility] ([facility])    [address] [place] [address] [phone]              Who to contact     If you have questions or need follow up information about today's clinic visit or your schedule please contact [facility] directly at [phone].  Normal or non-critical lab and imaging results will be communicated to you by [employer], letter or phone within 4 business days after the clinic has received the results. If you do not hear from us within 7 days, please contact the clinic through [employer] or phone. If you have a critical or abnormal lab result, we will notify you by phone as soon as possible.  Submit refill requests through [employer] or call your pharmacy and they will forward the refill request to us. Please allow 3 business days for your refill to be completed.          Additional Information About Your Visit        MyChart Information     [employer] gives you secure access to your electronic health record. If you see a primary care provider, you can also send messages to your care team and make  appointments. If you have questions, please call your primary care clinic.  If you do not have a primary care provider, please call [phone] and they will assist you.        Care EveryWhere ID     This is your Care EveryWhere ID. This could be used by other organizations to access your [place] medical records  [id number]        Your Vitals Were     Last Period                   03/26/2017 (Exact Date)            Blood Pressure from Last 3 Encounters:   09/21/17 120/60   08/24/17 110/60   08/18/17 123/67    Weight from Last 3 Encounters:   09/21/17 95.9 kg (211 lb 8 oz)   08/24/17 92.2 kg (203 lb 4 oz)   08/18/17 91.7 kg (202 lb 1.6 oz)              We Performed the Following     CHIROPRAC MANIP,SPINAL,3-4 REGIONS        Primary Care Provider Office Phone # Fax #    Alee Duncan [phone] [address] [place] [address]        Equal Access to Services     [facility] : Hadii aad ku hadasho Soomaali, waaxda luqadaha, qaybta kaalmada adeegyada, waxay idiin hayaan adeeg kharaidalmis la'maximino . So [facility] [phone].    ATENCIÓN: Si habla español, tiene a ley disposición servicios gratuitos de asistencia lingüística. Ll[facility] [phone].    We comply with applicable federal civil rights laws and [place] laws. We do not discriminate on the basis of race, color, national origin, age, disability sex, sexual orientation or gender identity.            Thank you!     Thank you for choosing [facility]  for your care. Our goal is always to provide you with excellent care. Hearing back from our patients is one way we can continue to improve our services. Please take a few minutes to complete the written survey that you may receive in the mail after your visit with us. Thank you!             Your Updated Medication List - Protect others around you: Learn how to safely use, store and throw away your medicines at www.disposemymeds.org.          This list is accurate as  of: 9/22/17  9:13 AM.  Always use your most recent med list.                   Brand Name Dispense Instructions for use Diagnosis    prenatal multivitamin plus iron 27-0.8 MG Tabs per tablet      Take 1 tablet by mouth daily

## 2017-09-27 NOTE — TELEPHONE ENCOUNTER
Surgery Scheduled    Date of Surgery 17 Time of Surgery 9:30am  Procedure: Repeat  Section & Bilateral Partial Salpingectomy  Hospital/Surgical Facility: [facility]  Surgeon: Dr Duncan  Type of Anesthesia Anticipated: Spinal  Pre-Op: 17 with Dr Duncan   Post-Op: 18 with Dr Duncan  Pre-Certification -to be completed  Consent Signed -to be completed  Hospital Stay -inpatient procedure    Surgery Packet (and/or) Colonscopy Prep (was given/or mailed) to patient. Patient was also instructed to arrive 1 1/2 hour(s) prior to surgery.  Patient understood and agrees to the plan.      Ivory Moody  Specialty    ______________________________________________  Surgery Pre-Certification    Medical Record Number: [id number]  Luceroyanet Ku  YOB: 1985   Phone: [phone] (home)   Primary Provider: Alee Duncan    Reason for Admit:  OB/GYN    Surgeon: Dr Duncan  Surgical Procedure: Repeat  Section & Bilateral Partial Salpingectomy  ICD-9 Coded: O34.219 & O30.049  Date of Surgery: 17  Consent signed? No    Date signed:   Hospital: [facility]  Inpatient- Length of stay:  3 days.    Requestor:  Aneta Moody     Location:  [facility]

## 2017-10-11 ENCOUNTER — TRANSFERRED RECORDS (OUTPATIENT)
Dept: HEALTH INFORMATION MANAGEMENT | Facility: CLINIC | Age: 32
End: 2017-10-11

## 2017-10-16 ENCOUNTER — THERAPY VISIT (OUTPATIENT)
Dept: CHIROPRACTIC MEDICINE | Facility: CLINIC | Age: 32
End: 2017-10-16
Payer: COMMERCIAL

## 2017-10-16 ENCOUNTER — PRENATAL OFFICE VISIT (OUTPATIENT)
Dept: OBGYN | Facility: OTHER | Age: 32
End: 2017-10-16
Payer: COMMERCIAL

## 2017-10-16 VITALS
WEIGHT: 218.5 LBS | BODY MASS INDEX: 37.8 KG/M2 | DIASTOLIC BLOOD PRESSURE: 70 MMHG | SYSTOLIC BLOOD PRESSURE: 126 MMHG | HEART RATE: 80 BPM

## 2017-10-16 DIAGNOSIS — M99.04 SEGMENTAL DYSFUNCTION OF SACRAL REGION: ICD-10-CM

## 2017-10-16 DIAGNOSIS — Z23 NEED FOR PROPHYLACTIC VACCINATION AND INOCULATION AGAINST INFLUENZA: ICD-10-CM

## 2017-10-16 DIAGNOSIS — M99.03 SEGMENTAL DYSFUNCTION OF LUMBAR REGION: ICD-10-CM

## 2017-10-16 DIAGNOSIS — O30.043 DICHORIONIC DIAMNIOTIC TWIN PREGNANCY IN THIRD TRIMESTER: Primary | ICD-10-CM

## 2017-10-16 DIAGNOSIS — M99.02 SEGMENTAL DYSFUNCTION OF THORACIC REGION: ICD-10-CM

## 2017-10-16 DIAGNOSIS — M54.9 MECHANICAL BACK PAIN: ICD-10-CM

## 2017-10-16 PROCEDURE — 99207 ZZC COMPLICATED OB VISIT: CPT | Performed by: OBSTETRICS & GYNECOLOGY

## 2017-10-16 PROCEDURE — 90471 IMMUNIZATION ADMIN: CPT | Performed by: OBSTETRICS & GYNECOLOGY

## 2017-10-16 PROCEDURE — 98941 CHIROPRACT MANJ 3-4 REGIONS: CPT | Mod: AT | Performed by: CHIROPRACTOR

## 2017-10-16 PROCEDURE — 90686 IIV4 VACC NO PRSV 0.5 ML IM: CPT | Performed by: OBSTETRICS & GYNECOLOGY

## 2017-10-16 NOTE — MR AVS SNAPSHOT
After Visit Summary   10/16/2017    Lucero Ku    MRN: [id number]           Patient Information     Date Of Birth          1985        Visit Information        Provider Department      10/16/2017 10:00 AM Alee Duncan MD [facility]        Today's Diagnoses     Dichorionic diamniotic twin pregnancy in third trimester    -  1       Follow-ups after your visit        Follow-up notes from your care team     Return in about 2 weeks (around 10/30/2017) for OB Visit.      Your next 10 appointments already scheduled     Oct 16, 2017  1:30 PM CDT   DELMY Chiropractor with Josephine Ramos DC   [facility]    [address] [place] [address]   [phone]            Nov 10, 2017  8:30 AM CST   Return Visit with Santiago Escamilla MD   [facility]    [address] [place] [address] [phone]            Dec 14, 2017  9:00 AM CST   ESTABLISHED PRENATAL with Alee Duncan MD   [facility]    [address] [facility] [id number] [phone]            Feb 01, 2018  9:00 AM CST   Office Visit with Alee Duncan MD   [facility] ([facility])    [address] [facility] [id number] [phone]           Bring a current list of meds and any records pertaining to this visit. For Physicals, please bring immunization records and any forms needing to be filled out. Please arrive 10 minutes early to complete paperwork.              Who to contact     If you have questions or need follow up information about today's clinic visit or your schedule please contact [facility] directly at [phone].  Normal or non-critical lab and imaging results will be communicated to you by MyChart, letter or phone within 4 business days after the clinic has received  the results. If you do not hear from us within 7 days, please contact the clinic through [employer] or phone. If you have a critical or abnormal lab result, we will notify you by phone as soon as possible.  Submit refill requests through [employer] or call your pharmacy and they will forward the refill request to us. Please allow 3 business days for your refill to be completed.          Additional Information About Your Visit        [employer] Information     [employer] gives you secure access to your electronic health record. If you see a primary care provider, you can also send messages to your care team and make appointments. If you have questions, please call your primary care clinic.  If you do not have a primary care provider, please call [phone] and they will assist you.        Care EveryWhere ID     This is your Care EveryWhere ID. This could be used by other organizations to access your [place] medical records  [id number]        Your Vitals Were     Pulse Last Period BMI (Body Mass Index)             80 03/26/2017 (Exact Date) 37.8 kg/m2          Blood Pressure from Last 3 Encounters:   10/16/17 126/70   09/21/17 120/60   08/24/17 110/60    Weight from Last 3 Encounters:   10/16/17 218 lb 8 oz (99.1 kg)   09/21/17 211 lb 8 oz (95.9 kg)   08/24/17 203 lb 4 oz (92.2 kg)              Today, you had the following     No orders found for display       Primary Care Provider Office Phone # Fax #    Alee Duncan [phone]       [address] [place] [address]        Equal Access to Services     [facility] AH: Hadii aad ku hadasho Soomaali, waaxda luqadaha, qaybta kaalmada adeegyada, annette schmitz. So [facility] [phone].    ATENCIÓN: Si habla español, tiene a ley disposición servicios gratuitos de asistencia lingüística. Llame al [phone].    We comply with applicable federal civil rights laws and [place] laws. We do not discriminate on the basis of race,  color, national origin, age, disability, sex, sexual orientation, or gender identity.            Thank you!     Thank you for choosing [facility]  for your care. Our goal is always to provide you with excellent care. Hearing back from our patients is one way we can continue to improve our services. Please take a few minutes to complete the written survey that you may receive in the mail after your visit with us. Thank you!             Your Updated Medication List - Protect others around you: Learn how to safely use, store and throw away your medicines at www.disposemymeds.org.          This list is accurate as of: 10/16/17 10:29 AM.  Always use your most recent med list.                   Brand Name Dispense Instructions for use Diagnosis    prenatal multivitamin plus iron 27-0.8 MG Tabs per tablet      Take 1 tablet by mouth daily

## 2017-10-16 NOTE — MR AVS SNAPSHOT
After Visit Summary   10/16/2017    Lucero Ku    MRN: [id number]           Patient Information     Date Of Birth          1985        Visit Information        Provider Department      10/16/2017 1:30 PM Josephine Ramos DC [facility]        Today's Diagnoses     Segmental dysfunction of lumbar region        Segmental dysfunction of thoracic region        Mechanical back pain        Segmental dysfunction of sacral region           Follow-ups after your visit        Your next 10 appointments already scheduled     Oct 24, 2017  2:15 PM CDT   LAB with NL LAB C   [facility]    [address] [place] [address] [phone]           Patient must bring picture ID. Patient should be prepared to give a urine specimen  Please do not eat 10-12 hours before your appointment if you are coming in fasting for labs on lipids, cholesterol, or glucose (sugar). Pregnant women should follow their Care Team instructions. Water with medications is okay. Do not drink coffee or other fluids. If you have concerns about taking  your medications, please ask at office or if scheduling via [employer], send a message by clicking on Secure Messaging, Message Your Care Team.            Oct 30, 2017  8:45 AM CDT   ESTABLISHED PRENATAL with Alee Duncan MD   [facility]    [address] [facility] [address] [phone]            Nov 10, 2017  8:30 AM CST   Return Visit with Santiago Escamilla MD   [facility]    [address] [place] [address]   [phone]            Dec 14, 2017  9:00 AM CST   ESTABLISHED PRENATAL with Alee Duncan MD   [facility]    [address] [facility] [address]   [phone]            Feb 01, 2018  9:00 AM CST   Office Visit  with Alee Duncan MD   [facility] ([facility])    [address] [facility] [address] [phone]           Bring a current list of meds and any records pertaining to this visit. For Physicals, please bring immunization records and any forms needing to be filled out. Please arrive 10 minutes early to complete paperwork.              Who to contact     If you have questions or need follow up information about today's clinic visit or your schedule please contact [facility] directly at [phone].  Normal or non-critical lab and imaging results will be communicated to you by Spotistichart, letter or phone within 4 business days after the clinic has received the results. If you do not hear from us within 7 days, please contact the clinic through Gustot or phone. If you have a critical or abnormal lab result, we will notify you by phone as soon as possible.  Submit refill requests through [employer] or call your pharmacy and they will forward the refill request to us. Please allow 3 business days for your refill to be completed.          Additional Information About Your Visit        MyChart Information     [employer] gives you secure access to your electronic health record. If you see a primary care provider, you can also send messages to your care team and make appointments. If you have questions, please call your primary care clinic.  If you do not have a primary care provider, please call [phone] and they will assist you.        Care EveryWhere ID     This is your Care EveryWhere ID. This could be used by other organizations to access your [place] medical records  [id number]        Your Vitals Were     Last Period                   03/26/2017 (Exact Date)            Blood Pressure from Last 3 Encounters:   10/16/17 126/70   09/21/17 120/60   08/24/17 110/60    Weight from Last 3 Encounters:   10/16/17 99.1 kg (218 lb 8 oz)   09/21/17 95.9 kg (211 lb  8 oz)   08/24/17 92.2 kg (203 lb 4 oz)              We Performed the Following     CHIROPRAC MANIP,SPINAL,3-4 REGIONS        Primary Care Provider Office Phone # Fax #    Alee Duncan [phone]       [address] [address]        Equal Access to Services     [facility]: Hadii aad ku hadasho Soomaali, waaxda luqadaha, qaybta kaalmada adeegyada, waxay idiin hayaan adeeg kharash la'aan . So [facility] [phone].    ATENCIÓN: Si habla español, tiene a ley disposición servicios gratuitos de asistencia lingüística. Llame al [phone].    We comply with applicable federal civil rights laws and [place] laws. We do not discriminate on the basis of race, color, national origin, age, disability, sex, sexual orientation, or gender identity.            Thank you!     Thank you for choosing [facility]  for your care. Our goal is always to provide you with excellent care. Hearing back from our patients is one way we can continue to improve our services. Please take a few minutes to complete the written survey that you may receive in the mail after your visit with us. Thank you!             Your Updated Medication List - Protect others around you: Learn how to safely use, store and throw away your medicines at www.disposemymeds.org.          This list is accurate as of: 10/16/17  1:44 PM.  Always use your most recent med list.                   Brand Name Dispense Instructions for use Diagnosis    prenatal multivitamin plus iron 27-0.8 MG Tabs per tablet      Take 1 tablet by mouth daily

## 2017-10-16 NOTE — NURSING NOTE
"Chief Complaint   Patient presents with     Prenatal Care       Initial /70 (BP Location: Right arm, Patient Position: Chair, Cuff Size: Adult Regular)  Pulse 80  Wt 218 lb 8 oz (99.1 kg)  LMP 03/26/2017 (Exact Date)  BMI 37.8 kg/m2 Estimated body mass index is 37.8 kg/(m^2) as calculated from the following:    Height as of 8/18/17: 5' 3.75\" (1.619 m).    Weight as of this encounter: 218 lb 8 oz (99.1 kg).  Medication Reconciliation: complete     Vianey Ryan, [facility]  October 16, 2017      "

## 2017-10-16 NOTE — PROGRESS NOTES
Injectable Influenza Immunization Documentation    1.  Is the person to be vaccinated sick today?   No    2. Does the person to be vaccinated have an allergy to a component   of the vaccine?   No    3. Has the person to be vaccinated ever had a serious reaction   to influenza vaccine in the past?   No    4. Has the person to be vaccinated ever had Guillain-Barré syndrome?   No    Form completed by Vianey Ryan [facility]

## 2017-10-16 NOTE — PROGRESS NOTES
Visit #:  4 of 8 based on treatment plan 9/1/2017    Subjective:  Lucero Ku is a 31 year old female who is seen in f/u up for:        Segmental dysfunction of lumbar region  Segmental dysfunction of thoracic region  Mechanical back pain  Segmental dysfunction of sacral region.     Since last visit on 9/8/2017,  Lucero Ku reports the following changes: Patient presents and states that she is 29 weeks pregnant with another set of twins. She states that her lower back just started really bothering her again. She is working a full schedule up until 37 weeks, and hopes to make it 38 weeks. She notes pulling in her anterior groin lately. She rates her current pain 3/10, but when she is working it is 7-8/10 and couldn't get off the couch. If she lays in one position too long, she is gets really sore.         Objective:  The following was observed:    P: pain elicited on palpation, bilateral upper traps    A: static palpation demonstrates intersegmental asymmetry, as noted    R: motion palpation notes restricted motion    T: localized muscle spasm at: Traps Bilaterally      Assessment:    Segmental spinal dysfunction/restrictions found at:  T1 RR, LRR  T5 E, FR  T10 E, FR  L4 LR, RRR  Right SI posterior  Left sacrum      Diagnoses:      1. Segmental dysfunction of lumbar region    2. Segmental dysfunction of thoracic region    3. Mechanical back pain    4. Segmental dysfunction of sacral region        Patient's condition:  Patient had restrictions pre-manipulation    Treatment effectiveness:  Post manipulation there is better intersegmental movement and Patient claims to feel looser post manipulation      Procedures:  CMT:  52245 Chiropractic manipulative treatment 3-4 regions performed   Thoracic: Mobilization, T1, T5, T10, Prone  Lumbar: Drop Table, L4, Drop  Pelvis: Drop Table, PSIS Right , sacrum,  Prone    Modalities:  95002: MSTM:  To Traps  for 5 min    Therapeutic procedures:  None      Prognosis:  Good    Progress towards Goals:    Decrease pain from 4/10 to 2/10 in 4 treatments.  Maintain healthy pregnancy.  Be able to continue to work without pain.    Response to Treatment:   Reduction of symptoms overall with care.      Recommendations:    Instructions:ice 20 minutes every other hour as needed    Follow-up:  Return to care in 2 weeks.

## 2017-10-16 NOTE — PROGRESS NOTES
Presents for routine  appointment.     No complaints aside from some low pelvic pulling when she goes up the stairs.    No LOF/VB/Ctxs.    ROS:   and GI  negative.     Please see Prenatal Vitals and Notes Flowsheet for objective data.    A/P:  32 year old  at 29w0d       ICD-10-CM    1. Dichorionic diamniotic twin pregnancy in third trimester O30.043        CS scheduled   MFM repeat US in November.    GCT Normal  TDAP today.    Rhogam: not  needed  Follow up in 2 weeks.      Alee Duncan MD

## 2017-10-23 ENCOUNTER — MYC MEDICAL ADVICE (OUTPATIENT)
Dept: OBGYN | Facility: OTHER | Age: 32
End: 2017-10-23

## 2017-10-23 NOTE — TELEPHONE ENCOUNTER
Noted patient is pregnant with twins. Next OB appt scheduled with Dr. Duncan on 10-30-17.   Unsure if patient needs to be seen sooner to evaluate.  BP Readings from Last 3 Encounters:   10/16/17 126/70   09/21/17 120/60   08/24/17 110/60       Will route to Dr. Duncan for review & orders. Paige Rosario RN, BAN

## 2017-10-24 ENCOUNTER — ALLIED HEALTH/NURSE VISIT (OUTPATIENT)
Dept: FAMILY MEDICINE | Facility: OTHER | Age: 32
End: 2017-10-24
Payer: COMMERCIAL

## 2017-10-24 VITALS — DIASTOLIC BLOOD PRESSURE: 66 MMHG | HEART RATE: 82 BPM | SYSTOLIC BLOOD PRESSURE: 112 MMHG

## 2017-10-24 DIAGNOSIS — R94.6 BORDERLINE ABNORMAL TFTS: ICD-10-CM

## 2017-10-24 DIAGNOSIS — Z01.30 BP CHECK: Primary | ICD-10-CM

## 2017-10-24 LAB
T4 SERPL-MCNC: 9.7 UG/DL (ref 4.5–13.9)
TSH SERPL DL<=0.005 MIU/L-ACNC: 1.63 MU/L (ref 0.4–4)

## 2017-10-24 PROCEDURE — 36415 COLL VENOUS BLD VENIPUNCTURE: CPT | Performed by: INTERNAL MEDICINE

## 2017-10-24 PROCEDURE — 84443 ASSAY THYROID STIM HORMONE: CPT | Performed by: INTERNAL MEDICINE

## 2017-10-24 PROCEDURE — 99207 ZZC NO CHARGE NURSE ONLY: CPT

## 2017-10-24 PROCEDURE — 84436 ASSAY OF TOTAL THYROXINE: CPT | Performed by: INTERNAL MEDICINE

## 2017-10-24 NOTE — MR AVS SNAPSHOT
After Visit Summary   10/24/2017    Lucero Ku    MRN: [id number]           Patient Information     Date Of Birth          1985        Visit Information        Provider Department      10/24/2017 2:00 PM NL FLOAT NURSE [facility]        Today's Diagnoses     BP check    -  1       Follow-ups after your visit        Your next 10 appointments already scheduled     Oct 24, 2017  2:15 PM CDT   LAB with NL LAB [facility] ([facility])    [address] [facility] [id number] [phone]           Patient must bring picture ID. Patient should be prepared to give a urine specimen  Please do not eat 10-12 hours before your appointment if you are coming in fasting for labs on lipids, cholesterol, or glucose (sugar). Pregnant women should follow their Care Team instructions. Water with medications is okay. Do not drink coffee or other fluids. If you have concerns about taking  your medications, please ask at office or if scheduling via [employer], send a message by clicking on Secure Messaging, Message Your Care Team.            Oct 30, 2017  8:45 AM CDT   ESTABLISHED PRENATAL with Alee Duncan MD   [facility]    [address] [facility] [address]   [phone]            Oct 30, 2017 10:00 AM CDT   DELMY Chiropractor with Josephine Ramos DC   [facility] ([facility])    [address] [address]   [phone]            Nov 10, 2017  8:30 AM CST   Return Visit with Santiago Escamilla MD   [facility] ([facility])    [address] [place] [address]   [phone]            Dec 14, 2017  9:00 AM CST   ESTABLISHED PRENATAL with Alee Duncan MD   [facility])    [address] [facility] [address]   [phone]             Feb 01, 2018  9:00 AM CST   Office Visit with Alee Duncan MD   [facility]    [address] [facility] [address] [phone]           Bring a current list of meds and any records pertaining to this visit. For Physicals, please bring immunization records and any forms needing to be filled out. Please arrive 10 minutes early to complete paperwork.              Who to contact     If you have questions or need follow up information about today's clinic visit or your schedule please contact [facility] directly at [phone].  Normal or non-critical lab and imaging results will be communicated to you by MyChart, letter or phone within 4 business days after the clinic has received the results. If you do not hear from us within 7 days, please contact the clinic through [employer] or phone. If you have a critical or abnormal lab result, we will notify you by phone as soon as possible.  Submit refill requests through [employer] or call your pharmacy and they will forward the refill request to us. Please allow 3 business days for your refill to be completed.          Additional Information About Your Visit        MyChart Information     [employer] gives you secure access to your electronic health record. If you see a primary care provider, you can also send messages to your care team and make appointments. If you have questions, please call your primary care clinic.  If you do not have a primary care provider, please call [phone] and they will assist you.        Care EveryWhere ID     This is your Care EveryWhere ID. This could be used by other organizations to access your [facility] medical records  [id number]        Your Vitals Were     Pulse Last Period                82 03/26/2017 (Exact Date)           Blood Pressure from Last 3 Encounters:   10/24/17 112/66   10/16/17 126/70   09/21/17 120/60    Weight from Last 3 Encounters:   10/16/17 218 lb 8 oz  (99.1 kg)   09/21/17 211 lb 8 oz (95.9 kg)   08/24/17 203 lb 4 oz (92.2 kg)              Today, you had the following     No orders found for display       Primary Care Provider Office Phone # Fax #    Alee Duncan [phone] [address] [address]        Equal Access to Services     [facility]: Hadii aad ku hadasho Soomaali, waaxda luqadaha, qaybta kaalmada adeegyada, waxay idiin hayaan adeeg kharash la'aan . So [facility] [phone].    ATENCIÓN: Si habla español, tiene a ley disposición servicios gratuitos de asistencia lingüística. Brynname al [phone].    We comply with applicable federal civil rights laws and [place] laws. We do not discriminate on the basis of race, color, national origin, age, disability, sex, sexual orientation, or gender identity.            Thank you!     Thank you for choosing [facility]  for your care. Our goal is always to provide you with excellent care. Hearing back from our patients is one way we can continue to improve our services. Please take a few minutes to complete the written survey that you may receive in the mail after your visit with us. Thank you!             Your Updated Medication List - Protect others around you: Learn how to safely use, store and throw away your medicines at www.disposemymeds.org.          This list is accurate as of: 10/24/17  2:01 PM.  Always use your most recent med list.                   Brand Name Dispense Instructions for use Diagnosis    prenatal multivitamin plus iron 27-0.8 MG Tabs per tablet      Take 1 tablet by mouth daily

## 2017-10-24 NOTE — NURSING NOTE
Chief Complaint   Patient presents with     BP Check     Lucero Ku is a 32 year old female who comes in today for a Blood Pressure check because of edema during pregnancy.    *Document pulse and BP  *Use new set of vitals button for multiple readings.  *Use extended vitals for orthostatic    Vitals as recorded, a regular cuff was used.    Patient is not taking medication as prescribed  Patient n/a tolerating medications well.  Patient is not monitoring Blood Pressure at home.  Average readings if yes are n/a    Current complaints: swelling or edema    Disposition: follow-up as indicated by MD/TAHIR Zhang CMA (AAMA)

## 2017-10-26 NOTE — PROGRESS NOTES
Dear Lucero,     The thyroid function test is normal. We will continue to monitor this 6-8 weeks going forward. This is ordered for you.     Best regards,   Santiago Escamilla MD  [id number]  Endocrinology Service

## 2017-10-30 ENCOUNTER — PRENATAL OFFICE VISIT (OUTPATIENT)
Dept: OBGYN | Facility: OTHER | Age: 32
End: 2017-10-30
Payer: COMMERCIAL

## 2017-10-30 ENCOUNTER — THERAPY VISIT (OUTPATIENT)
Dept: CHIROPRACTIC MEDICINE | Facility: CLINIC | Age: 32
End: 2017-10-30
Payer: COMMERCIAL

## 2017-10-30 VITALS
HEART RATE: 84 BPM | BODY MASS INDEX: 38.75 KG/M2 | WEIGHT: 224 LBS | SYSTOLIC BLOOD PRESSURE: 114 MMHG | DIASTOLIC BLOOD PRESSURE: 70 MMHG

## 2017-10-30 DIAGNOSIS — M99.02 SEGMENTAL DYSFUNCTION OF THORACIC REGION: ICD-10-CM

## 2017-10-30 DIAGNOSIS — M54.9 MECHANICAL BACK PAIN: ICD-10-CM

## 2017-10-30 DIAGNOSIS — Z23 NEED FOR TDAP VACCINATION: ICD-10-CM

## 2017-10-30 DIAGNOSIS — M99.03 SEGMENTAL DYSFUNCTION OF LUMBAR REGION: ICD-10-CM

## 2017-10-30 DIAGNOSIS — M99.04 SEGMENTAL DYSFUNCTION OF SACRAL REGION: ICD-10-CM

## 2017-10-30 DIAGNOSIS — O30.043 DICHORIONIC DIAMNIOTIC TWIN PREGNANCY IN THIRD TRIMESTER: Primary | ICD-10-CM

## 2017-10-30 DIAGNOSIS — O34.219 HISTORY OF CESAREAN DELIVERY AFFECTING PREGNANCY: ICD-10-CM

## 2017-10-30 PROCEDURE — 98941 CHIROPRACT MANJ 3-4 REGIONS: CPT | Mod: AT | Performed by: CHIROPRACTOR

## 2017-10-30 PROCEDURE — 90471 IMMUNIZATION ADMIN: CPT | Performed by: OBSTETRICS & GYNECOLOGY

## 2017-10-30 PROCEDURE — 99207 ZZC COMPLICATED OB VISIT: CPT | Performed by: OBSTETRICS & GYNECOLOGY

## 2017-10-30 PROCEDURE — 90715 TDAP VACCINE 7 YRS/> IM: CPT | Performed by: OBSTETRICS & GYNECOLOGY

## 2017-10-30 NOTE — MR AVS SNAPSHOT
After Visit Summary   10/30/2017    Lucero Ku    MRN: [id number]           Patient Information     Date Of Birth          1985        Visit Information        Provider Department      10/30/2017 8:45 AM Alee Duncan MD [facility]        Today's Diagnoses     Dichorionic diamniotic twin pregnancy in third trimester    -  1    History of  delivery affecting pregnancy           Follow-ups after your visit        Follow-up notes from your care team     Return in about 2 weeks (around 2017) for OB Visit.      Your next 10 appointments already scheduled     Oct 30, 2017 10:00 AM CDT   DEL[facility] Chiropractor with Josephine Ramos DC   [facility]    [address] [place] [address]   [phone]            Nov 10, 2017  8:30 AM CST   Return Visit with Santiago Escamilla MD   [facility])    [address] [place] [address]   [phone]            2017  9:00 AM CST   ESTABLISHED PRENATAL with Alee Duncan MD   [facility] ([facility])    [address] [address]   [phone]            Dec 14, 2017  9:00 AM CST   ESTABLISHED PRENATAL with Alee Duncan MD   [facility] ([facility])    [address] [address]   [phone]            2018  9:00 AM CST   Office Visit with Alee Duncan MD   [facility] ([facility])    [address] [address]   [phone]           Bring a current list of meds and any records pertaining to this visit. For Physicals, please bring immunization records and any forms needing to be filled out. Please arrive 10 minutes early to complete paperwork.              Who to contact     If you have questions or need follow up information about  today's clinic visit or your schedule please contact [facility] [place] directly at [phone].  Normal or non-critical lab and imaging results will be communicated to you by MyChart, letter or phone within 4 business days after the clinic has received the results. If you do not hear from us within 7 days, please contact the clinic through MyChart or phone. If you have a critical or abnormal lab result, we will notify you by phone as soon as possible.  Submit refill requests through [employer] or call your pharmacy and they will forward the refill request to us. Please allow 3 business days for your refill to be completed.          Additional Information About Your Visit        [employer] Information     [employer] gives you secure access to your electronic health record. If you see a primary care provider, you can also send messages to your care team and make appointments. If you have questions, please call your primary care clinic.  If you do not have a primary care provider, please call [phone] and they will assist you.        Care EveryWhere ID     This is your Care EveryWhere ID. This could be used by other organizations to access your [place] medical records  [id number]        Your Vitals Were     Pulse Last Period BMI (Body Mass Index)             84 03/26/2017 (Exact Date) 38.75 kg/m2          Blood Pressure from Last 3 Encounters:   10/30/17 114/70   10/24/17 112/66   10/16/17 126/70    Weight from Last 3 Encounters:   10/30/17 224 lb (101.6 kg)   10/16/17 218 lb 8 oz (99.1 kg)   09/21/17 211 lb 8 oz (95.9 kg)              Today, you had the following     No orders found for display       Primary Care Provider Office Phone # Fax #    Alee Duncan [phone] [address] [address]        Equal Access to Services     DARYL JUNG : Maria Guadalupe Novoa, watruman luqadaha, qaybta kaalmaoly plasencia, [place] . So [employer]  [phone].    ATENCIÓN: Si don crawford, tiene a ley disposición servicios gratuitos de asistencia lingüística. Beverley al [phone].    We comply with applicable federal civil rights laws and [place] laws. We do not discriminate on the basis of race, color, national origin, age, disability, sex, sexual orientation, or gender identity.            Thank you!     Thank you for choosing [facility]  for your care. Our goal is always to provide you with excellent care. Hearing back from our patients is one way we can continue to improve our services. Please take a few minutes to complete the written survey that you may receive in the mail after your visit with us. Thank you!             Your Updated Medication List - Protect others around you: Learn how to safely use, store and throw away your medicines at www.disposemymeds.org.          This list is accurate as of: 10/30/17  9:15 AM.  Always use your most recent med list.                   Brand Name Dispense Instructions for use Diagnosis    prenatal multivitamin plus iron 27-0.8 MG Tabs per tablet      Take 1 tablet by mouth daily

## 2017-10-30 NOTE — PROGRESS NOTES
Presents for routine  appointment.    She continues to have some swelling.  A little better.  Pressure, but not concerned.    No LOF/VB/Ctxs.    ROS:   and GI  negative.     Please see Prenatal Vitals and Notes Flowsheet for objective data.    A/P:  32 year old  at 31w0d       ICD-10-CM    1. Dichorionic diamniotic twin pregnancy in third trimester O30.043    2. History of  delivery affecting pregnancy O34.219        Tdap given today  Plan repeat   w/tubal      at 930 am  She will continue to see M  Follow up in 2 weeks.      Alee Duncan MD

## 2017-10-30 NOTE — PROGRESS NOTES
"Visit #:  4  5 of 8 based on treatment plan 9/1/2017    Subjective:  Lucero Ku is a 31 year old female who is seen in f/u up for:        Segmental dysfunction of lumbar region  Segmental dysfunction of thoracic region  Mechanical back pain  Segmental dysfunction of sacral region.     Since last visit on 10/16/2017,  Lucero Ku reports the following changes: Patient presents and states that she is feeling a lot of pelvic pressure, like there is a baby butt \"right there.\" She notes that it turns into pain at times. She is currently 31 weeks.       Objective:  The following was observed:    P: pain elicited on palpation, bilateral upper traps    A: static palpation demonstrates intersegmental asymmetry, as noted    R: motion palpation notes restricted motion    T: localized muscle spasm at: Traps Bilaterally      Assessment:    Segmental spinal dysfunction/restrictions found at:  T1 RR, LRR  T5 E, FR  T10 E, FR  L4 RR, LRR  Left sacrum      Diagnoses:      1. Segmental dysfunction of lumbar region    2. Segmental dysfunction of thoracic region    3. Mechanical back pain    4. Segmental dysfunction of sacral region        Patient's condition:  Patient had restrictions pre-manipulation    Treatment effectiveness:  Post manipulation there is better intersegmental movement and Patient claims to feel looser post manipulation      Procedures:  CMT:  18688 Chiropractic manipulative treatment 3-4 regions performed   Thoracic: Mobilization, T1, T5, T10, Prone  Lumbar: Drop Table, L4, Drop  Pelvis: Drop Table, Left sacrum,  Prone    Modalities:  29651: MSTM:  To Traps  for 5 min    Therapeutic procedures:  None      Prognosis: Good    Progress towards Goals:    Decrease pain from 4/10 to 2/10 in 4 treatments.  Maintain healthy pregnancy.  Be able to continue to work without pain.    Response to Treatment:   Reduction of symptoms overall with care. Patient has been able to continue to " work.      Recommendations:    Instructions:ice 20 minutes every other hour as needed    Follow-up:  Return to care in 2 weeks.

## 2017-10-30 NOTE — MR AVS SNAPSHOT
After Visit Summary   10/30/2017    Lucero Ku    MRN: [id number]           Patient Information     Date Of Birth          1985        Visit Information        Provider Department      10/30/2017 10:00 AM Josephine Ramos DC [facility]        Today's Diagnoses     Segmental dysfunction of lumbar region        Segmental dysfunction of thoracic region        Mechanical back pain        Segmental dysfunction of sacral region           Follow-ups after your visit        Your next 10 appointments already scheduled     Nov 10, 2017  8:30 AM CST   Return Visit with Santiago Escamilla MD   [facility] ([facility])    [address] [place] [address]   [phone]            Nov 13, 2017  9:00 AM CST   ESTABLISHED PRENATAL with Alee Duncan MD   [facility] ([facility])    [address] [place] [address]   [phone]            Dec 14, 2017  9:00 AM CST   ESTABLISHED PRENATAL with Alee Duncan MD   [facility] ([facility])    [address] [place] [address]   [phone]            Feb 01, 2018  9:00 AM CST   Office Visit with Alee Duncan MD   [facility] ([address] [address] [phone]           Bring a current list of meds and any records pertaining to this visit. For Physicals, please bring immunization records and any forms needing to be filled out. Please arrive 10 minutes early to complete paperwork.              Who to contact     If you have questions or need follow up information about today's clinic visit or your schedule please contact [facility] directly at [phone].  Normal or non-critical lab and imaging results will be communicated to you by MyChart, letter or phone within 4 business days after the clinic  has received the results. If you do not hear from us within 7 days, please contact the clinic through [employer] or phone. If you have a critical or abnormal lab result, we will notify you by phone as soon as possible.  Submit refill requests through [employer] or call your pharmacy and they will forward the refill request to us. Please allow 3 business days for your refill to be completed.          Additional Information About Your Visit        [employer] Information     [employer] gives you secure access to your electronic health record. If you see a primary care provider, you can also send messages to your care team and make appointments. If you have questions, please call your primary care clinic.  If you do not have a primary care provider, please call [phone] and they will assist you.        Care EveryWhere ID     This is your Care EveryWhere ID. This could be used by other organizations to access your [place] medical records  [id number]        Your Vitals Were     Last Period                   03/26/2017 (Exact Date)            Blood Pressure from Last 3 Encounters:   10/30/17 114/70   10/24/17 112/66   10/16/17 126/70    Weight from Last 3 Encounters:   10/30/17 101.6 kg (224 lb)   10/16/17 99.1 kg (218 lb 8 oz)   09/21/17 95.9 kg (211 lb 8 oz)              We Performed the Following     CHIROPRAC MANIP,SPINAL,3-4 REGIONS        Primary Care Provider Office Phone # Fax #    Alee Duncan [phone] [address] [place] [address]        Equal Access to Services     DARYL JUNG : Hadii aad ku hadasho Soomaali, waaxda luqadaha, qaybta kaalmada adeegyada, annette hobson haymaximino park . So [facility] [phone].    ATENCIÓN: Si habla español, tiene a ley disposición servicios gratuitos de asistencia lingüística. Llame al [phone].    We comply with applicable federal civil rights laws and [place] laws. We do not discriminate on the basis of race, color, national  origin, age, disability, sex, sexual orientation, or gender identity.            Thank you!     Thank you for choosing [facility]  for your care. Our goal is always to provide you with excellent care. Hearing back from our patients is one way we can continue to improve our services. Please take a few minutes to complete the written survey that you may receive in the mail after your visit with us. Thank you!             Your Updated Medication List - Protect others around you: Learn how to safely use, store and throw away your medicines at www.disposemymeds.org.          This list is accurate as of: 10/30/17 10:10 AM.  Always use your most recent med list.                   Brand Name Dispense Instructions for use Diagnosis    prenatal multivitamin plus iron 27-0.8 MG Tabs per tablet      Take 1 tablet by mouth daily

## 2017-10-30 NOTE — NURSING NOTE
"Chief Complaint   Patient presents with     Prenatal Care       Initial /70 (BP Location: Right arm, Patient Position: Chair, Cuff Size: Adult Regular)  Pulse 84  Wt 224 lb (101.6 kg)  LMP 03/26/2017 (Exact Date)  BMI 38.75 kg/m2 Estimated body mass index is 38.75 kg/(m^2) as calculated from the following:    Height as of 8/18/17: 5' 3.75\" (1.619 m).    Weight as of this encounter: 224 lb (101.6 kg).  Medication Reconciliation: complete     Screening Questionnaire for Adult Immunization    Are you sick today?   No   Do you have allergies to medications, food, a vaccine component or latex?   No   Have you ever had a serious reaction after receiving a vaccination?   No   Do you have a long-term health problem with heart disease, lung disease, asthma, kidney disease, metabolic disease (e.g. diabetes), anemia, or other blood disorder?   No   Do you have cancer, leukemia, HIV/AIDS, or any other immune system problem?   No   In the past 3 months, have you taken medications that affect  your immune system, such as prednisone, other steroids, or anticancer drugs; drugs for the treatment of rheumatoid arthritis, Crohn s disease, or psoriasis; or have you had radiation treatments?   No   Have you had a seizure, or a brain or other nervous system problem?   No   During the past year, have you received a transfusion of blood or blood     products, or been given immune (gamma) globulin or antiviral drug?   No   For women: Are you pregnant or is there a chance you could become        pregnant during the next month?   Yes   Have you received any vaccinations in the past 4 weeks?   Yes     Immunization questionnaire was positive for at least one answer.  Notified Dr Duncan.        Per orders of Dr. Duncan, injection of Tdap given by Vianey Ryan. Patient instructed to remain in clinic for 15 minutes afterwards, and to report any adverse reaction to me immediately.    Prior to injection verified patient identity using " patient's name and date of birth.       Screening performed by Vianey Ryan on 10/30/2017 at 8:49 AM.    Vianey Ryan, [facility]  October 30, 2017

## 2017-10-31 ENCOUNTER — MYC MEDICAL ADVICE (OUTPATIENT)
Dept: OBGYN | Facility: OTHER | Age: 32
End: 2017-10-31

## 2017-10-31 NOTE — TELEPHONE ENCOUNTER
MM please review and advise if you are okay with the  generating a letter with the patient's  date on it for the spouse. This is per the patients request. Spouses name is located in the [employer] message. Freya Nelson RN, BSN

## 2017-10-31 NOTE — LETTER
[address] [address]  [phone]          2017              To whom it may concern,     Talib Dahl wife Lucero Ku is scheduled to have a  Section delivery on 17.  If you have further questions or concerns please contact the clinic at the number listed above.        Sincerely,           Alee Duncan MD

## 2017-11-07 ENCOUNTER — MYC MEDICAL ADVICE (OUTPATIENT)
Dept: ENDOCRINOLOGY | Facility: CLINIC | Age: 32
End: 2017-11-07

## 2017-11-08 ENCOUNTER — MYC MEDICAL ADVICE (OUTPATIENT)
Dept: OBGYN | Facility: OTHER | Age: 32
End: 2017-11-08

## 2017-11-10 ENCOUNTER — TRANSFERRED RECORDS (OUTPATIENT)
Dept: HEALTH INFORMATION MANAGEMENT | Facility: CLINIC | Age: 32
End: 2017-11-10

## 2017-11-13 ENCOUNTER — PRENATAL OFFICE VISIT (OUTPATIENT)
Dept: OBGYN | Facility: OTHER | Age: 32
End: 2017-11-13
Payer: COMMERCIAL

## 2017-11-13 ENCOUNTER — THERAPY VISIT (OUTPATIENT)
Dept: CHIROPRACTIC MEDICINE | Facility: CLINIC | Age: 32
End: 2017-11-13
Payer: COMMERCIAL

## 2017-11-13 VITALS
HEART RATE: 80 BPM | WEIGHT: 226.5 LBS | DIASTOLIC BLOOD PRESSURE: 70 MMHG | BODY MASS INDEX: 39.18 KG/M2 | SYSTOLIC BLOOD PRESSURE: 116 MMHG

## 2017-11-13 DIAGNOSIS — O34.219 HISTORY OF CESAREAN DELIVERY AFFECTING PREGNANCY: ICD-10-CM

## 2017-11-13 DIAGNOSIS — M99.04 SEGMENTAL DYSFUNCTION OF SACRAL REGION: ICD-10-CM

## 2017-11-13 DIAGNOSIS — M54.9 MECHANICAL BACK PAIN: ICD-10-CM

## 2017-11-13 DIAGNOSIS — M99.03 SEGMENTAL DYSFUNCTION OF LUMBAR REGION: ICD-10-CM

## 2017-11-13 DIAGNOSIS — O30.043 DICHORIONIC DIAMNIOTIC TWIN PREGNANCY IN THIRD TRIMESTER: Primary | ICD-10-CM

## 2017-11-13 DIAGNOSIS — M99.02 SEGMENTAL DYSFUNCTION OF THORACIC REGION: ICD-10-CM

## 2017-11-13 PROCEDURE — 98941 CHIROPRACT MANJ 3-4 REGIONS: CPT | Mod: AT | Performed by: CHIROPRACTOR

## 2017-11-13 PROCEDURE — 99207 ZZC COMPLICATED OB VISIT: CPT | Performed by: OBSTETRICS & GYNECOLOGY

## 2017-11-13 NOTE — PROGRESS NOTES
"Visit #:  6 of 8 based on treatment plan 9/1/2017    Subjective:  Lucero Ku is a 31 year old female who is seen in f/u up for:        Segmental dysfunction of lumbar region  Segmental dysfunction of thoracic region  Mechanical back pain  Segmental dysfunction of sacral region.     Since last visit on 10/30/2017,  Lucero Ku reports the following changes: Patient presents and states that she is doing pretty good today. She states that she is \"so done\" with being pregnant. She is 33 weeks pregnant today with twins. She states that after work, she feels like she needs a wheelchair to get out of work. She worked on Saturday and couldn't get off the couch yesterday. She rates her current pain in her hips and mid-thoracic spine 7-8/10.       Objective:  The following was observed:    P: pain elicited on palpation, bilateral upper traps    A: static palpation demonstrates intersegmental asymmetry, as noted    R: motion palpation notes restricted motion    T: localized muscle spasm at: Traps Bilaterally      Assessment:    Segmental spinal dysfunction/restrictions found at:  T1 RR, LRR  T6 E, FR  T11 E, FR  L4 RR, LRR  Left posterior, restricted P to A  Right sacrum      Diagnoses:      1. Segmental dysfunction of lumbar region    2. Segmental dysfunction of thoracic region    3. Mechanical back pain    4. Segmental dysfunction of sacral region        Patient's condition:  Patient had restrictions pre-manipulation    Treatment effectiveness:  Post manipulation there is better intersegmental movement and Patient claims to feel looser post manipulation      Procedures:  CMT:  66452 Chiropractic manipulative treatment 3-4 regions performed   Thoracic: Mobilization, T1, T6, T11, Prone  Lumbar: Drop Table, L4, Drop  Pelvis: Drop Table, Left SI, Right sacrum,  Prone    Modalities:  30120: MSTM:  To Traps  for 5 min    Therapeutic procedures:  None      Prognosis: Good    Progress towards Goals:    Decrease pain " from 4/10 to 2/10 in 4 treatments.  Maintain healthy pregnancy.  Be able to continue to work without pain.    Response to Treatment:   Reduction of symptoms overall with care. Patient has been able to continue to work.      Recommendations:    Instructions:ice 20 minutes every other hour as needed    Follow-up:  Return to care in 1 week.

## 2017-11-13 NOTE — MR AVS SNAPSHOT
After Visit Summary   11/13/2017    Lucero Ku    MRN: [id number]           Patient Information     Date Of Birth          1985        Visit Information        Provider Department      11/13/2017 10:30 AM Josephine Ramos DC [facility]        Today's Diagnoses     Segmental dysfunction of lumbar region        Segmental dysfunction of thoracic region        Mechanical back pain        Segmental dysfunction of sacral region           Follow-ups after your visit        Your next 10 appointments already scheduled     Nov 27, 2017 10:00 AM CST   ESTABL[facility] PR[facility]ATAL with Alee Duncan MD   [facility]    [address] [address]   [phone]            Dec 14, 2017  9:00 AM CST   ESTABLISHED PRENATAL with Alee Duncan MD   [facility]    [address] [address]   [phone]            Feb 01, 2018  9:00 AM CST   Office Visit with Alee Duncan MD   [facility]    [address] [address]   [phone]           Bring a current list of meds and any records pertaining to this visit. For Physicals, please bring immunization records and any forms needing to be filled out. Please arrive 10 minutes early to complete paperwork.              Who to contact     If you have questions or need follow up information about today's clinic visit or your schedule please contact [facility] directly at [phone].  Normal or non-critical lab and imaging results will be communicated to you by MyChart, letter or phone within 4 business days after the clinic has received the results. If you do not hear from us within 7 days, please contact the clinic through MyChart or phone. If you have a critical or abnormal lab result, we will notify you by phone as soon as  possible.  Submit refill requests through [employer] or call your pharmacy and they will forward the refill request to us. Please allow 3 business days for your refill to be completed.          Additional Information About Your Visit        [employer] Information     [employer] gives you secure access to your electronic health record. If you see a primary care provider, you can also send messages to your care team and make appointments. If you have questions, please call your primary care clinic.  If you do not have a primary care provider, please call [phone] and they will assist you.        Care EveryWhere ID     This is your Care EveryWhere ID. This could be used by other organizations to access your [place] medical records  [id number]        Your Vitals Were     Last Period                   03/26/2017 (Exact Date)            Blood Pressure from Last 3 Encounters:   11/13/17 116/70   10/30/17 114/70   10/24/17 112/66    Weight from Last 3 Encounters:   11/13/17 102.7 kg (226 lb 8 oz)   10/30/17 101.6 kg (224 lb)   10/16/17 99.1 kg (218 lb 8 oz)              We Performed the Following     CHIROPRAC MANIP,SPINAL,3-4 REGIONS        Primary Care Provider Office Phone # Fax #    Alee Duncan [phone] [address] [place] [address]        Equal Access to Services     [facility]L [facility] : Hadii aad ku hadasho Soomaali, waaxda luqadaha, qaybta kaalmada adeegyada, waxay idiin hayaan greg park . So [facility] [phone].    ATENCIÓN: Si habla español, tiene a ley disposición servicios gratuitos de asistencia lingüística. Llame al [phone].    We comply with applicable federal civil rights laws and [place] laws. We do not discriminate on the basis of race, color, national origin, age, disability, sex, sexual orientation, or gender identity.            Thank you!     Thank you for choosing [facility]  for your care. Our goal is always to provide you with  excellent care. Hearing back from our patients is one way we can continue to improve our services. Please take a few minutes to complete the written survey that you may receive in the mail after your visit with us. Thank you!             Your Updated Medication List - Protect others around you: Learn how to safely use, store and throw away your medicines at www.disposemymeds.org.          This list is accurate as of: 11/13/17 10:42 AM.  Always use your most recent med list.                   Brand Name Dispense Instructions for use Diagnosis    prenatal multivitamin plus iron 27-0.8 MG Tabs per tablet      Take 1 tablet by mouth daily

## 2017-11-13 NOTE — NURSING NOTE
"Chief Complaint   Patient presents with     Prenatal Care       Initial /70 (BP Location: Right arm, Patient Position: Chair, Cuff Size: Adult Regular)  Pulse 80  Wt 226 lb 8 oz (102.7 kg)  LMP 03/26/2017 (Exact Date)  BMI 39.18 kg/m2 Estimated body mass index is 39.18 kg/(m^2) as calculated from the following:    Height as of 8/18/17: 5' 3.75\" (1.619 m).    Weight as of this encounter: 226 lb 8 oz (102.7 kg).  Medication Reconciliation: complete     Vianey Ryan, [facility]  November 13, 2017      "

## 2017-11-13 NOTE — MR AVS SNAPSHOT
After Visit Summary   2017    Lucero Ku    MRN: [id number]           Patient Information     Date Of Birth          1985        Visit Information        Provider Department      2017 9:00 AM Alee Duncan MD [facility]        Today's Diagnoses     Dichorionic diamniotic twin pregnancy in third trimester    -  1    History of  delivery affecting pregnancy           Follow-ups after your visit        Follow-up notes from your care team     Return in about 2 weeks (around 2017) for OB Visit.      Your next 10 appointments already scheduled     2017 10:30 AM CST   [facility] Chiropractor with Josephine Ramos DC   [facility]    [address] [place] [address]   [phone]            2017 10:00 AM CST   ESTABLISHED PRENATAL with Alee Duncan MD   [facility]    [address] [facility] [address] [facility] [id number] [phone]            Dec 14, 2017  9:00 AM CST   ESTABLISHED PRENATAL with Alee Duncan MD   [facility]    [address] [facility] [id number] [phone]            2018  9:00 AM CST   Office Visit with Alee Duncan MD   [facility]    [address] [facility] [id number] [phone]           Bring a current list of meds and any records pertaining to this visit. For Physicals, please bring immunization records and any forms needing to be filled out. Please arrive 10 minutes early to complete paperwork.              Who to contact     If you have questions or need follow up information about today's clinic visit or your schedule please contact [facility] directly at [phone].  Normal or non-critical lab and imaging results will be communicated to you by MyChart, letter or phone within 4  business days after the clinic has received the results. If you do not hear from us within 7 days, please contact the clinic through [employer] or phone. If you have a critical or abnormal lab result, we will notify you by phone as soon as possible.  Submit refill requests through [employer] or call your pharmacy and they will forward the refill request to us. Please allow 3 business days for your refill to be completed.          Additional Information About Your Visit        [employer] Information     [employer] gives you secure access to your electronic health record. If you see a primary care provider, you can also send messages to your care team and make appointments. If you have questions, please call your primary care clinic.  If you do not have a primary care provider, please call [phone] and they will assist you.        Care EveryWhere ID     This is your Care EveryWhere ID. This could be used by other organizations to access your [place] medical records  [id number]        Your Vitals Were     Pulse Last Period BMI (Body Mass Index)             80 03/26/2017 (Exact Date) 39.18 kg/m2          Blood Pressure from Last 3 Encounters:   11/13/17 116/70   10/30/17 114/70   10/24/17 112/66    Weight from Last 3 Encounters:   11/13/17 102.7 kg (226 lb 8 oz)   10/30/17 101.6 kg (224 lb)   10/16/17 99.1 kg (218 lb 8 oz)              Today, you had the following     No orders found for display       Primary Care Provider Office Phone # Fax #    Alee Duncan [phone]       [address] [place] [address]        Equal Access to Services     BETHANY JUNG : Hadii aad ku hadasho Soomaali, waaxda luqadaha, qaybta kaalmada gregyaoly, annette schmitz. So [facility] [phone].    ATENCIÓN: Si habla español, tiene a ley disposición servicios gratuitos de asistencia lingüística. Llame al [phone].    We comply with applicable federal civil rights laws and [place] laws. We do  not discriminate on the basis of race, color, national origin, age, disability, sex, sexual orientation, or gender identity.            Thank you!     Thank you for choosing [facility]  for your care. Our goal is always to provide you with excellent care. Hearing back from our patients is one way we can continue to improve our services. Please take a few minutes to complete the written survey that you may receive in the mail after your visit with us. Thank you!             Your Updated Medication List - Protect others around you: Learn how to safely use, store and throw away your medicines at www.disposemymeds.org.          This list is accurate as of: 11/13/17  9:20 AM.  Always use your most recent med list.                   Brand Name Dispense Instructions for use Diagnosis    prenatal multivitamin plus iron 27-0.8 MG Tabs per tablet      Take 1 tablet by mouth daily

## 2017-11-17 ENCOUNTER — TRANSFERRED RECORDS (OUTPATIENT)
Dept: HEALTH INFORMATION MANAGEMENT | Facility: CLINIC | Age: 32
End: 2017-11-17

## 2017-11-22 ENCOUNTER — TRANSFERRED RECORDS (OUTPATIENT)
Dept: HEALTH INFORMATION MANAGEMENT | Facility: CLINIC | Age: 32
End: 2017-11-22

## 2017-11-27 ENCOUNTER — PRENATAL OFFICE VISIT (OUTPATIENT)
Dept: OBGYN | Facility: OTHER | Age: 32
End: 2017-11-27
Payer: COMMERCIAL

## 2017-11-27 VITALS
WEIGHT: 228 LBS | BODY MASS INDEX: 39.44 KG/M2 | HEART RATE: 100 BPM | DIASTOLIC BLOOD PRESSURE: 88 MMHG | SYSTOLIC BLOOD PRESSURE: 120 MMHG

## 2017-11-27 DIAGNOSIS — O30.049 DICHORIONIC DIAMNIOTIC TWIN PREGNANCY, ANTEPARTUM: Primary | ICD-10-CM

## 2017-11-27 DIAGNOSIS — O09.813 PREGNANCY RESULTING FROM IN VITRO FERTILIZATION IN THIRD TRIMESTER: ICD-10-CM

## 2017-11-27 DIAGNOSIS — O34.219 HISTORY OF CESAREAN DELIVERY AFFECTING PREGNANCY: ICD-10-CM

## 2017-11-27 PROCEDURE — 99207 ZZC COMPLICATED OB VISIT: CPT | Performed by: OBSTETRICS & GYNECOLOGY

## 2017-11-27 PROCEDURE — 87653 STREP B DNA AMP PROBE: CPT | Performed by: OBSTETRICS & GYNECOLOGY

## 2017-11-27 NOTE — MR AVS SNAPSHOT
After Visit Summary   2017    Lucero Ku    MRN: [id number]           Patient Information     Date Of Birth          1985        Visit Information        Provider Department      2017 10:00 AM Alee Duncan MD [facility]        Today's Diagnoses     Dichorionic diamniotic twin pregnancy, antepartum    -  1    Pregnancy resulting from in vitro fertilization in third trimester        History of  delivery affecting pregnancy           Follow-ups after your visit        Follow-up notes from your care team     Return in about 1 week (around 2017) for OB Visit.      Your next 10 appointments already scheduled     Dec 14, 2017  9:00 AM CST   ESTABLISHED PRENATAL with Alee Duncan MD   [facility]    [address] [id number] [phone]            2018  9:00 AM CST   Office Visit with Alee Duncan MD   [facility]    [address] [facility] [id number] [phone]           Bring a current list of meds and any records pertaining to this visit. For Physicals, please bring immunization records and any forms needing to be filled out. Please arrive 10 minutes early to complete paperwork.              Who to contact     If you have questions or need follow up information about today's clinic visit or your schedule please contact [facility] directly at [phone].  Normal or non-critical lab and imaging results will be communicated to you by MyChart, letter or phone within 4 business days after the clinic has received the results. If you do not hear from us within 7 days, please contact the clinic through MyChart or phone. If you have a critical or abnormal lab result, we will notify you by phone as soon as possible.  Submit refill requests through [employer] or call your pharmacy and they will forward the refill  request to us. Please allow 3 business days for your refill to be completed.          Additional Information About Your Visit        MyChart Information     [employer] gives you secure access to your electronic health record. If you see a primary care provider, you can also send messages to your care team and make appointments. If you have questions, please call your primary care clinic.  If you do not have a primary care provider, please call [phone] and they will assist you.        Care EveryWhere ID     This is your Care EveryWhere ID. This could be used by other organizations to access your [place] medical records  [id number]        Your Vitals Were     Pulse Last Period BMI (Body Mass Index)             100 03/26/2017 (Exact Date) 39.44 kg/m2          Blood Pressure from Last 3 Encounters:   11/27/17 120/88   11/13/17 116/70   10/30/17 114/70    Weight from Last 3 Encounters:   11/27/17 228 lb (103.4 kg)   11/13/17 226 lb 8 oz (102.7 kg)   10/30/17 224 lb (101.6 kg)              We Performed the Following     Strep, Group B by PCR        Primary Care Provider Office Phone # Fax #    Alee Duncan [phone] [address] [place] [address]        Equal Access to Services     DARYL JUNG AH: Hadii aad ku hadasho Soomaali, waaxda luqadaha, qaybta kaalmada adeegyada, waxay idiin hayclementn greg hayes lamc schmitz. So [facility] [phone].    ATENCIÓN: Si habla español, tiene a ley disposición servicios gratuitos de asistencia lingüística. Llame al [phone].    We comply with applicable federal civil rights laws and [place] laws. We do not discriminate on the basis of race, color, national origin, age, disability, sex, sexual orientation, or gender identity.            Thank you!     Thank you for choosing [facility]  for your care. Our goal is always to provide you with excellent care. Hearing back from our patients is one way we can continue to improve our  services. Please take a few minutes to complete the written survey that you may receive in the mail after your visit with us. Thank you!             Your Updated Medication List - Protect others around you: Learn how to safely use, store and throw away your medicines at www.disposemymeds.org.          This list is accurate as of: 11/27/17 10:14 AM.  Always use your most recent med list.                   Brand Name Dispense Instructions for use Diagnosis    prenatal multivitamin plus iron 27-0.8 MG Tabs per tablet      Take 1 tablet by mouth daily

## 2017-11-27 NOTE — NURSING NOTE
"Chief Complaint   Patient presents with     Prenatal Care       Initial /88 (BP Location: Left arm, Patient Position: Chair, Cuff Size: Adult Regular)  Pulse 100  Wt 228 lb (103.4 kg)  LMP 03/26/2017 (Exact Date)  BMI 39.44 kg/m2 Estimated body mass index is 39.44 kg/(m^2) as calculated from the following:    Height as of 8/18/17: 5' 3.75\" (1.619 m).    Weight as of this encounter: 228 lb (103.4 kg).  Medication Reconciliation: complete     Vianey Ryan, [facility]  November 27, 2017      "

## 2017-11-27 NOTE — PROGRESS NOTES
2017     NAME:  Lucero Ku  PCP:  Alee Duncan  MRN:  [id number]    PREOPERATIVE EXAM    Impression / Plan     Assessment:     32 year old  at 35w0d with history of      Di/di twins.  A on right, breech, presenting.  B on left, breech.     IVF pregnancy - do not discuss in front of family.      Family status complete.      Mild pyelectasis both twins    Marginal cord insertion twin B    Body mass index is 39.44 kg/(m^2).     Patient is at intermediate risk for the proposed surgery    Plan:      Repeat  section with bilateral partial salpingectomy    We discussed the plans for  section. She is aware of the risks, benefits, and alternatives to  delivery.  Risks include but are not limited to bleeding, infections which may require antibiotic therapy, injury maternal organs (to include but not limited to blood vessels, nerves, muscle, skin, bladder, ureters, bowel, uterus, tubes, ovaries), as well as risk of injury to the fetus.  If bleeding is excessive it might require transfusion or rarely a hysterectomy.  She acknowledged understanding of the risks.  We did discuss the tubal ligation as an option at the same time and she does want that to be done.     Group B Strep done today    Continue BPP with MFM    She is cleared for surgery    HPI     Lucero Ku is a  32 year old who is seen for routine prenatal appointment.  She has a repeat  with tubal scheduled for  when she is 38 wks 1 day.      Patient has no concerns today, but she did have some BH on Saturday.  Better today.  She denies contractions, leaking of amniotic fluid, and vaginal bleeding.      1. NO - Do you have a history of heart attack, stroke, stent, bypass or surgery on an artery in the head, neck, heart or legs?  2. NO - Do you ever have any pain or discomfort in your chest?  3. NO - Do you have a history of  Heart Failure?  4. NO - Are you troubled by shortness of  breath when: walking on the level, up a slight hill or at night?  5. NO - Do you currently have a cold, bronchitis or other respiratory infection?  6. NO - Do you have a cough, shortness of breath or wheezing?  7. NO - Do you sometimes get pains in the calves of your legs when you walk?  8. NO - Do you or anyone in your family have previous history of blood clots?  9. NO - Do you or does anyone in your family have a serious bleeding problem such as prolonged bleeding following surgeries or cuts?  10. NO - Have you ever had problems with anemia or been told to take iron pills?  11. NO - Have you had any abnormal blood loss such as black, tarry or bloody stools, or abnormal vaginal bleeding?  12. NO - Have you ever had a blood transfusion?  13. NO - Have you or any of your relatives ever had problems with anesthesia?  14. NO - Do you have sleep apnea, excessive snoring or daytime drowsiness?  15. NO - Do you have any prosthetic heart valves?  16. NO - Do you have prosthetic joints?  17. Yes - Is there any chance that you may be pregnant?        Problem List   Patient Active Problem List   Diagnosis     S/P  section     PCOS (polycystic ovarian syndrome)     Hashimoto's thyroiditis     Chronic rhinitis     Dysfunction of Eustachian tube, right     Dichorionic diamniotic twin pregnancy, antepartum     Need for diphtheria-tetanus-pertussis (Tdap) vaccine     Subchorionic bleed, first trimester     Pregnancy conceived through in vitro fertilization     Rubella non-immune status, antepartum     History of  delivery affecting pregnancy     Segmental dysfunction of lumbar region     Segmental dysfunction of sacral region     Segmental dysfunction of thoracic region     Mechanical back pain        Medications     Current Outpatient Prescriptions   Medication     Prenatal Vit-Fe Fumarate-FA (PRENATAL MULTIVITAMIN  PLUS IRON) 27-0.8 MG TABS per tablet     No current facility-administered medications for this  visit.           Allergies     Allergies   Allergen Reactions     Hydrocodone-Acetaminophen Nausea and Vomiting     Reaction after taking Vicodin for tooth extraction.  Can take plain Tylenol.       Medical / Surgical History     Past Medical History:   Diagnosis Date     Hashimoto's thyroiditis      Infertility, female     in vitro, tiwn birth     PCOS (polycystic ovarian syndrome)        Past Surgical History:   Procedure Laterality Date      SECTION  3/4/2014    Procedure:  SECTION;;  Surgeon: Paige Ly MD;  Location: [facility]     ELBOW SURGERY      Right elbow     HC TOOTH EXTRACTION W/FORCEP       HYSTEROSCOPY DIAGNOSTIC  2011       Social History     Social History     Social History     Marital status:      Spouse name: N/A     Number of children: 2     Years of education: N/A     Occupational History      [employer]     Social History Main Topics     Smoking status: Never Smoker     Smokeless tobacco: Never Used      Comment: No smokers in home.     Alcohol use Yes      Comment: social     Drug use: No     Sexual activity: Yes     Partners: Male     Birth control/ protection: None     Other Topics Concern     Parent/Sibling W/ Cabg, Mi Or Angioplasty Before 65f 55m? No      Service No     Blood Transfusions No     Caffeine Concern No     Occupational Exposure Yes     QHB HOLDINGSby Hazards No     Sleep Concern No     Stress Concern No     Weight Concern No     Special Diet No     Back Care No     Exercise No     Seat Belt Yes     Social History Narrative     to [place] and lives in [place].  No smokers in the home.  No concerns about domestic violence.  Has an indoor cat.  Aware of toxoplasmosis precautions.         Family History     Family History   Problem Relation Age of Onset     Hypertension Mother      Hypertension Father      CANCER Father 55     Stage IV Kidney ()     Thyroid Disease Father      hypothyroidism      DIABETES Maternal Grandfather      Hypertension Maternal Grandfather      Lipids Maternal Grandfather      Defibrilator     CANCER Paternal Grandmother      Thyroid Disease Paternal Grandmother      Hypertension Paternal Grandmother      CANCER Paternal Grandfather      Thyroid Disease Paternal Grandfather      Hypertension Paternal Grandfather      Hypertension Sister      Sleep Apnea Sister      Anxiety Disorder Sister      Hypertension Brother      Anxiety Disorder Brother        ROS     A 6 organ review of systems was asked and the pertinent positives and negatives are listed in the HPI. All other organ systems can be considered negative.     Physical Exam   Vitals: /88 (BP Location: Left arm, Patient Position: Chair, Cuff Size: Adult Regular)  Pulse 100  Wt 228 lb (103.4 kg)  LMP 03/26/2017 (Exact Date)  BMI 39.44 kg/m2    General: Well developed, well nourished, pleasant. No acute distress.    Neck: Trachea midline. Supple, no palpable masses.  No thyromegaly.  CV: Regular rhythm, normal rate. No murmurs auscultated  Resp: Nonlabored. Clear to auscultation bilaterally, no wheezes.   Abdomen: Soft, non tender, gravid  Skin: No rashes, lesions, or subcutaneous nodules.   : closed, thick, high.    Neuro:  Alert and oriented times 3.  Appropriate.    See obstetrical flowsheet for additional findings.     Labs/Imaging       Labs were reviewed in Epic    No results found for: GBS      Imaging was reviewed in Epic.        20 minutes was spent with the patient, more than 50% spent counseling and coordinating care.      Alee Duncan MD

## 2017-11-28 LAB
GP B STREP DNA SPEC QL NAA+PROBE: NEGATIVE
SPECIMEN SOURCE: NORMAL

## 2017-12-01 ENCOUNTER — TRANSFERRED RECORDS (OUTPATIENT)
Dept: HEALTH INFORMATION MANAGEMENT | Facility: CLINIC | Age: 32
End: 2017-12-01

## 2017-12-04 ENCOUNTER — PRENATAL OFFICE VISIT (OUTPATIENT)
Dept: OBGYN | Facility: OTHER | Age: 32
End: 2017-12-04
Payer: COMMERCIAL

## 2017-12-04 VITALS
HEART RATE: 108 BPM | SYSTOLIC BLOOD PRESSURE: 124 MMHG | BODY MASS INDEX: 40.48 KG/M2 | WEIGHT: 234 LBS | DIASTOLIC BLOOD PRESSURE: 82 MMHG

## 2017-12-04 DIAGNOSIS — O09.899 RUBELLA NON-IMMUNE STATUS, ANTEPARTUM: ICD-10-CM

## 2017-12-04 DIAGNOSIS — Z30.2 ENCOUNTER FOR STERILIZATION: ICD-10-CM

## 2017-12-04 DIAGNOSIS — O30.049 DICHORIONIC DIAMNIOTIC TWIN PREGNANCY, ANTEPARTUM: ICD-10-CM

## 2017-12-04 DIAGNOSIS — O34.219 HISTORY OF CESAREAN DELIVERY AFFECTING PREGNANCY: Primary | ICD-10-CM

## 2017-12-04 DIAGNOSIS — O09.813 PREGNANCY RESULTING FROM IN VITRO FERTILIZATION IN THIRD TRIMESTER: ICD-10-CM

## 2017-12-04 DIAGNOSIS — Z28.39 RUBELLA NON-IMMUNE STATUS, ANTEPARTUM: ICD-10-CM

## 2017-12-04 PROCEDURE — 99207 ZZC PRENATAL VISIT: CPT | Performed by: OBSTETRICS & GYNECOLOGY

## 2017-12-04 ASSESSMENT — PAIN SCALES - GENERAL: PAINLEVEL: NO PAIN (0)

## 2017-12-04 NOTE — PROGRESS NOTES
32 year old  at 36w0d weeks with di/di twins presents to the clinic for a routine prenatal visit.  BPPs=Friday ultrasounds with MFM  Peds Urology to follow up after delivery due to mild pyelectasis in Baby A  No vaginal bleeding or leakage of fluid  Some irregular contractions   Fundal height=47cm  HYOz=O=790's and B=150's  CX=Ft/50-3  GBS=negative  Scheduled c section with TL on   Patient desires a tubal ligation=consent signed today.  Patient states she had discussed this previously with Dr. Duncan  Labor precautions discussed  RTC 1 week    Chelo Mart

## 2017-12-04 NOTE — NURSING NOTE
"Chief Complaint   Patient presents with     Prenatal Care     GBS was done       Initial /82  Pulse 108  Wt 234 lb (106.1 kg)  LMP 03/26/2017 (Exact Date)  BMI 40.48 kg/m2 Estimated body mass index is 40.48 kg/(m^2) as calculated from the following:    Height as of 8/18/17: 5' 3.75\" (1.619 m).    Weight as of this encounter: 234 lb (106.1 kg).  Medication Reconciliation: complete     36w0d    "

## 2017-12-04 NOTE — PATIENT INSTRUCTIONS
What to watch out for are: regular contractions every 5 min, vaginal bleeding, decreased fetal movement, or leakage of fluid.  Please call the office or go to L&D if you develop any of these signs and symptoms.      I will see you for your follow up appointment.  Please feel free to call if you have any questions or concerns.      Thanks,  Chelo Mart, DO

## 2017-12-04 NOTE — MR AVS SNAPSHOT
After Visit Summary   2017    Lucero Ku    MRN: [id number]           Patient Information     Date Of Birth          1985        Visit Information        Provider Department      2017 10:45 AM Chelo Mart DO [facility]        Today's Diagnoses     History of  delivery affecting pregnancy    -  1    Dichorionic diamniotic twin pregnancy, antepartum        Pregnancy resulting from in vitro fertilization in third trimester        Encounter for sterilization        Rubella non-immune status, antepartum          Care Instructions    What to watch out for are: regular contractions every 5 min, vaginal bleeding, decreased fetal movement, or leakage of fluid.  Please call the office or go to L&D if you develop any of these signs and symptoms.      I will see you for your follow up appointment.  Please feel free to call if you have any questions or concerns.      Thanks,  Chelo Mart, DO            Follow-ups after your visit        Follow-up notes from your care team     Return in about 1 week (around 2017).      Your next 10 appointments already scheduled     Dec 11, 2017 10:30 AM CST   [facility]    [address] [facility] [address]   [phone]            Dec 14, 2017  9:00 AM CST   ESTABL[facility]AT[facility]    [address] [place] [address]   [phone]            Dec 18, 2017 10:00 AM CST   ESTABLISHED PRENATAL with [facility] MD   [facility]    [address] [facility] [address]   [phone]            2018  9:00 AM CST   [facility] Visit with [facility]    [address] [facility] [address]   [phone]            Bring a current list of meds and any records pertaining to this visit. For Physicals, please bring immunization records and any forms needing to be filled out. Please arrive 10 minutes early to complete paperwork.              Who to contact     If you have questions or need follow up information about today's clinic visit or your schedule please contact [facility] directly at [phone].  Normal or non-critical lab and imaging results will be communicated to you by [employer], letter or phone within 4 business days after the clinic has received the results. If you do not hear from us within 7 days, please contact the clinic through [employer] or phone. If you have a critical or abnormal lab result, we will notify you by phone as soon as possible.  Submit refill requests through [employer] or call your pharmacy and they will forward the refill request to us. Please allow 3 business days for your refill to be completed.          Additional Information About Your Visit        [employer] Information     [employer] gives you secure access to your electronic health record. If you see a primary care provider, you can also send messages to your care team and make appointments. If you have questions, please call your primary care clinic.  If you do not have a primary care provider, please call [phone] and they will assist you.        Care EveryWhere ID     This is your Care EveryWhere ID. This could be used by other organizations to access your [facility] medical records  [id number]        Your Vitals Were     Pulse Last Period BMI (Body Mass Index)             108 03/26/2017 (Exact Date) 40.48 kg/m2          Blood Pressure from Last 3 Encounters:   12/04/17 124/82   11/27/17 120/88   11/13/17 116/70    Weight from Last 3 Encounters:   12/04/17 234 lb (106.1 kg)   11/27/17 228 lb (103.4 kg)   11/13/17 226 lb 8 oz (102.7 kg)              Today, you had the following     No orders found for display       Primary  Care Provider Office Phone # Fax #    Alee Duncan [phone] [address] [address]        Equal Access to Services     LILLIANBETHANY ERICH : Hadporfirio rea bansal jonathano Sojaidaali, waaxda luqadaha, qaybta kaalmada crispinda, annette frankconrad nadir. So [facility] [phone].    ATENCIÓN: Si habla español, tiene a ley disposición servicios gratuitos de asistencia lingüística. Llame al [phone].    We comply with applicable federal civil rights laws and [place] laws. We do not discriminate on the basis of race, color, national origin, age, disability, sex, sexual orientation, or gender identity.            Thank you!     Thank you for choosing [facility]  for your care. Our goal is always to provide you with excellent care. Hearing back from our patients is one way we can continue to improve our services. Please take a few minutes to complete the written survey that you may receive in the mail after your visit with us. Thank you!             Your Updated Medication List - Protect others around you: Learn how to safely use, store and throw away your medicines at www.disposemymeds.org.          This list is accurate as of: 12/4/17 11:48 AM.  Always use your most recent med list.                   Brand Name Dispense Instructions for use Diagnosis    prenatal multivitamin plus iron 27-0.8 MG Tabs per tablet      Take 1 tablet by mouth daily

## 2017-12-08 ENCOUNTER — TRANSFERRED RECORDS (OUTPATIENT)
Dept: HEALTH INFORMATION MANAGEMENT | Facility: CLINIC | Age: 32
End: 2017-12-08

## 2017-12-11 ENCOUNTER — PRENATAL OFFICE VISIT (OUTPATIENT)
Dept: OBGYN | Facility: OTHER | Age: 32
End: 2017-12-11
Payer: COMMERCIAL

## 2017-12-11 VITALS
BODY MASS INDEX: 39.92 KG/M2 | WEIGHT: 230.75 LBS | DIASTOLIC BLOOD PRESSURE: 80 MMHG | SYSTOLIC BLOOD PRESSURE: 120 MMHG | HEART RATE: 88 BPM

## 2017-12-11 DIAGNOSIS — O34.219 HISTORY OF CESAREAN DELIVERY AFFECTING PREGNANCY: ICD-10-CM

## 2017-12-11 DIAGNOSIS — O30.049 DICHORIONIC DIAMNIOTIC TWIN PREGNANCY, ANTEPARTUM: Primary | ICD-10-CM

## 2017-12-11 PROCEDURE — 99207 ZZC COMPLICATED OB VISIT: CPT | Performed by: OBSTETRICS & GYNECOLOGY

## 2017-12-11 NOTE — MR AVS SNAPSHOT
After Visit Summary   2017    Lucero Ku    MRN: [id number]           Patient Information     Date Of Birth          1985        Visit Information        Provider Department      2017 10:30 AM Alee Duncan MD [facility]        Today's Diagnoses     Dichorionic diamniotic twin pregnancy, antepartum    -  1    History of  delivery affecting pregnancy           Follow-ups after your visit        Your next 10 appointments already scheduled     Dec 14, 2017  9:00 AM CST   ESTABL[facility] PRENATAL with [facility] MD   [facility]    [address] [place] [address]   [phone]            Dec 18, 2017 10:00 AM CST   ESTABLISHED PRENATAL with [facility] MD   [facility]    [address] [facility] [address]   [phone]            2018  9:00 AM CST   [facility] Visit with [facility]   [facility]    [address] [address]   [phone]           Bring a current list of meds and any records pertaining to this visit. For Physicals, please bring immunization records and any forms needing to be filled out. Please arrive 10 minutes early to complete paperwork.              Who to contact     If you have questions or need follow up information about today's clinic visit or your schedule please contact [facility] directly at [phone].  Normal or non-critical lab and imaging results will be communicated to you by MyChart, letter or phone within 4 business days after the clinic has received the results. If you do not hear from us within 7 days, please contact the clinic through [employer] or phone. If you have a critical or abnormal lab result, we will notify you by phone as soon as possible.  Submit refill requests through [email or web address] or call your pharmacy and  they will forward the refill request to us. Please allow 3 business days for your refill to be completed.          Additional Information About Your Visit        [employer] Information     [employer] gives you secure access to your electronic health record. If you see a primary care provider, you can also send messages to your care team and make appointments. If you have questions, please call your primary care clinic.  If you do not have a primary care provider, please call [phone] and they will assist you.        Care EveryWhere ID     This is your Care EveryWhere ID. This could be used by other organizations to access your [facility] medical records  [id number]        Your Vitals Were     Pulse Last Period BMI (Body Mass Index)             88 03/26/2017 (Exact Date) 39.92 kg/m2          Blood Pressure from Last 3 Encounters:   12/11/17 120/80   12/04/17 124/82   11/27/17 120/88    Weight from Last 3 Encounters:   12/11/17 230 lb 12 oz (104.7 kg)   12/04/17 234 lb (106.1 kg)   11/27/17 228 lb (103.4 kg)              Today, you had the following     No orders found for display       Primary Care Provider Office Phone # Fax #    Alee Duncan [phone] [address] [place] [address]        Equal Access to Services     DARYL JUNG AH: Hadii aad ku hadasho Soomaali, waaxda luqadaha, qaybta kaalmada adeegyada, waxay idiin hayaan greg hayes lamc schmitz. So [facility] [phone].    ATENCIÓN: Si habla español, tiene a ley disposición servicios gratuitos de asistencia lingüística. Llame al [phone].    We comply with applicable federal civil rights laws and [place] laws. We do not discriminate on the basis of race, color, national origin, age, disability, sex, sexual orientation, or gender identity.            Thank you!     Thank you for choosing [facility]  for your care. Our goal is always to provide you with excellent care. Hearing back from our patients is one way we  can continue to improve our services. Please take a few minutes to complete the written survey that you may receive in the mail after your visit with us. Thank you!             Your Updated Medication List - Protect others around you: Learn how to safely use, store and throw away your medicines at www.disposemymeds.org.          This list is accurate as of: 12/11/17 10:52 AM.  Always use your most recent med list.                   Brand Name Dispense Instructions for use Diagnosis    prenatal multivitamin plus iron 27-0.8 MG Tabs per tablet      Take 1 tablet by mouth daily

## 2017-12-11 NOTE — NURSING NOTE
"Chief Complaint   Patient presents with     Prenatal Care       Initial /80 (BP Location: Left arm, Patient Position: Chair, Cuff Size: Adult Regular)  Pulse 88  Wt 230 lb 12 oz (104.7 kg)  LMP 03/26/2017 (Exact Date)  BMI 39.92 kg/m2 Estimated body mass index is 39.92 kg/(m^2) as calculated from the following:    Height as of 8/18/17: 5' 3.75\" (1.619 m).    Weight as of this encounter: 230 lb 12 oz (104.7 kg).  Medication Reconciliation: complete     Vianey Ryan, [facility]  December 11, 2017      "

## 2017-12-13 ENCOUNTER — MYC MEDICAL ADVICE (OUTPATIENT)
Dept: OBGYN | Facility: OTHER | Age: 32
End: 2017-12-13

## 2017-12-14 NOTE — TELEPHONE ENCOUNTER
[employer] message read 12/13/2017 at 6:20 PM by Lucero Ku. No response received at this time will close encounter. Freya Nelson RN, BSN

## 2017-12-15 ENCOUNTER — TRANSFERRED RECORDS (OUTPATIENT)
Dept: HEALTH INFORMATION MANAGEMENT | Facility: CLINIC | Age: 32
End: 2017-12-15

## 2017-12-19 ENCOUNTER — TRANSFERRED RECORDS (OUTPATIENT)
Dept: HEALTH INFORMATION MANAGEMENT | Facility: CLINIC | Age: 32
End: 2017-12-19

## 2017-12-26 ENCOUNTER — TELEPHONE (OUTPATIENT)
Dept: OBGYN | Facility: OTHER | Age: 32
End: 2017-12-26

## 2017-12-26 NOTE — TELEPHONE ENCOUNTER
Left message for patient to return call to notify her the forms that were given to Dr Duncan still need the patients signature in order for us to complete and send off to [facility].

## 2017-12-27 DIAGNOSIS — E06.3 HASHIMOTO'S THYROIDITIS: ICD-10-CM

## 2017-12-27 LAB
T3 SERPL-MCNC: 176 NG/DL (ref 60–181)
T4 SERPL-MCNC: 12 UG/DL (ref 4.5–13.9)
TSH SERPL DL<=0.005 MIU/L-ACNC: 1.26 MU/L (ref 0.4–4)

## 2017-12-27 PROCEDURE — 84480 ASSAY TRIIODOTHYRONINE (T3): CPT | Performed by: INTERNAL MEDICINE

## 2017-12-27 PROCEDURE — 84443 ASSAY THYROID STIM HORMONE: CPT | Performed by: INTERNAL MEDICINE

## 2017-12-27 PROCEDURE — 36415 COLL VENOUS BLD VENIPUNCTURE: CPT | Performed by: INTERNAL MEDICINE

## 2017-12-27 PROCEDURE — 84436 ASSAY OF TOTAL THYROXINE: CPT | Performed by: INTERNAL MEDICINE

## 2017-12-27 NOTE — TELEPHONE ENCOUNTER
Patient stopped in to sign the forms today.  Completed forms were faxed to [facility] and scanned to this encounter.

## 2017-12-29 ENCOUNTER — TELEPHONE (OUTPATIENT)
Dept: OBGYN | Facility: OTHER | Age: 32
End: 2017-12-29

## 2017-12-29 NOTE — TELEPHONE ENCOUNTER
Notified  [facility] Medical Records Department that Dr Duncan is out of the office and will return on Thursday 01/04/18.

## 2017-12-29 NOTE — TELEPHONE ENCOUNTER
Waiting for you to respond on coding sherri for this patient. Medical records [facility]. [facility] [phone]

## 2018-01-04 NOTE — PROGRESS NOTES
Dear Lucero,     Thyroid function test continue to remain normal. No changes are needed at this time. Please FU after few months of delivery.     With Best Regards,   Santiago Escamilla MD  1955  Endocrinology Service

## 2018-02-01 ENCOUNTER — OFFICE VISIT (OUTPATIENT)
Dept: OBGYN | Facility: OTHER | Age: 33
End: 2018-02-01
Payer: COMMERCIAL

## 2018-02-01 VITALS
BODY MASS INDEX: 33.91 KG/M2 | SYSTOLIC BLOOD PRESSURE: 110 MMHG | DIASTOLIC BLOOD PRESSURE: 70 MMHG | HEART RATE: 82 BPM | WEIGHT: 196 LBS

## 2018-02-01 DIAGNOSIS — N92.6 IRREGULAR MENSTRUAL CYCLE: ICD-10-CM

## 2018-02-01 PROBLEM — O30.049 DICHORIONIC DIAMNIOTIC TWIN PREGNANCY, ANTEPARTUM: Status: RESOLVED | Noted: 2017-05-24 | Resolved: 2018-02-01

## 2018-02-01 PROBLEM — Z23 NEED FOR DIPHTHERIA-TETANUS-PERTUSSIS (TDAP) VACCINE: Status: RESOLVED | Noted: 2017-05-24 | Resolved: 2018-02-01

## 2018-02-01 PROBLEM — O41.8X10 SUBCHORIONIC BLEED, FIRST TRIMESTER: Status: RESOLVED | Noted: 2017-05-24 | Resolved: 2018-02-01

## 2018-02-01 PROBLEM — O34.219 HISTORY OF CESAREAN DELIVERY AFFECTING PREGNANCY: Status: RESOLVED | Noted: 2017-06-22 | Resolved: 2018-02-01

## 2018-02-01 PROBLEM — O09.899 RUBELLA NON-IMMUNE STATUS, ANTEPARTUM: Status: RESOLVED | Noted: 2017-06-01 | Resolved: 2018-02-01

## 2018-02-01 PROBLEM — Z28.39 RUBELLA NON-IMMUNE STATUS, ANTEPARTUM: Status: RESOLVED | Noted: 2017-06-01 | Resolved: 2018-02-01

## 2018-02-01 PROBLEM — O46.8X1 SUBCHORIONIC BLEED, FIRST TRIMESTER: Status: RESOLVED | Noted: 2017-05-24 | Resolved: 2018-02-01

## 2018-02-01 PROBLEM — O09.819 PREGNANCY CONCEIVED THROUGH IN VITRO FERTILIZATION: Status: RESOLVED | Noted: 2017-05-24 | Resolved: 2018-02-01

## 2018-02-01 PROBLEM — Z30.2 ENCOUNTER FOR STERILIZATION: Status: RESOLVED | Noted: 2017-12-04 | Resolved: 2018-02-01

## 2018-02-01 PROCEDURE — 99207 ZZC POST PARTUM EXAM: CPT | Performed by: OBSTETRICS & GYNECOLOGY

## 2018-02-01 RX ORDER — ACETAMINOPHEN AND CODEINE PHOSPHATE 120; 12 MG/5ML; MG/5ML
1 SOLUTION ORAL DAILY
Qty: 84 TABLET | Refills: 3 | Status: SHIPPED | OUTPATIENT
Start: 2018-02-01 | End: 2018-12-03

## 2018-02-01 ASSESSMENT — ANXIETY QUESTIONNAIRES
6. BECOMING EASILY ANNOYED OR IRRITABLE: NOT AT ALL
3. WORRYING TOO MUCH ABOUT DIFFERENT THINGS: NOT AT ALL
7. FEELING AFRAID AS IF SOMETHING AWFUL MIGHT HAPPEN: NOT AT ALL
5. BEING SO RESTLESS THAT IT IS HARD TO SIT STILL: NOT AT ALL
1. FEELING NERVOUS, ANXIOUS, OR ON EDGE: SEVERAL DAYS
GAD7 TOTAL SCORE: 1
IF YOU CHECKED OFF ANY PROBLEMS ON THIS QUESTIONNAIRE, HOW DIFFICULT HAVE THESE PROBLEMS MADE IT FOR YOU TO DO YOUR WORK, TAKE CARE OF THINGS AT HOME, OR GET ALONG WITH OTHER PEOPLE: NOT DIFFICULT AT ALL
2. NOT BEING ABLE TO STOP OR CONTROL WORRYING: NOT AT ALL

## 2018-02-01 ASSESSMENT — PATIENT HEALTH QUESTIONNAIRE - PHQ9: 5. POOR APPETITE OR OVEREATING: NOT AT ALL

## 2018-02-01 NOTE — NURSING NOTE
"Chief Complaint   Patient presents with     Post Partum Exam     6 week post partum       Initial /70 (BP Location: Right arm, Patient Position: Chair, Cuff Size: Adult Regular)  Pulse 82  Wt 196 lb (88.9 kg)  LMP 2017 (Exact Date)  BMI 33.91 kg/m2 Estimated body mass index is 33.91 kg/(m^2) as calculated from the following:    Height as of 17: 5' 3.75\" (1.619 m).    Weight as of this encounter: 196 lb (88.9 kg).  BP completed using cuff size: regular        The following HM Due: NONE      The following patient reported/Care Every where data was sent to:  P ABSTRACT QUALITY INITIATIVES [88395]       N/a    Vianey Ryan CMA  2018           "

## 2018-02-01 NOTE — PROGRESS NOTES
SUBJECTIVE:  32 year old  here for a 6-week postpartum checkup.  She had a repeat CS for Di/Di twins.  She also had a tubal.   She has had some spotting since delivery.  This is getting better.         Obstetric History       T1      L4     SAB0   TAB0   Ectopic0   Multiple2   Live Births4       # Outcome Date GA Lbr Zak/2nd Weight Sex Delivery Anes PTL Lv   2A Term 17 38w1d  6 lb 6 oz (2.892 kg) F CS-LTranv Spinal N ZAYDA      Complications: Breech presentation      Apgar1:  8                Apgar5: 9   2B Term 17 38w1d  7 lb 10 oz (3.459 kg) F CS-LTranv Spinal N ZAYDA      Complications: Breech presentation      Apgar1:  9                Apgar5: 9   1A  14 35w0d  4 lb 2.3 oz (1.88 kg) M -SEC   ZAYDA      Name: SIDNEYBABY1 HEAVEN PROSPER      Complications: IUGR (intrauterine growth restriction) affecting care of mother      Apgar1:  8                Apgar5: 9   1B  14 35w0d  6 lb 8.4 oz (2.96 kg) M -SEC   ZAYDA      Name: SIDNEY,BABY2 HEAVEN M      Apgar1:  6                Apgar5: 9          Complications: none    Since delivery, she has been breast feeding.  She has no bleeding, abnormal discharge or pain. She has not had intercourse since delivery. Patient screened for postpartum depression. Has good support system in place.      Lab Results   Component Value Date    PAP NIL 07/15/2016    PAP NIL 2013         EXAM:  Wt 196 lb (88.9 kg)  LMP 2017 (Exact Date)  BMI 33.91 kg/m2     General: alert, healthy appearing woman  Abd:  subcentimeter umbilical hernia. No bowel protruding through the hernia.    Incision well healed  Pelvic exam:  deferred    Pap:  ______ with HPV cotesting;    _______with Reflex HPV;  ____x__ not indicated    ASSESSMENT:  Normal postpartum exam  subcentimeter umbilical hernia.     PLAN:  1. Contraceptive options reviewed; patient wishes to utilize: tubal and minipill for cycles.  Will switch to apri when she is  done breast feeding.  She will call in and we can send in a prescription.      2. Continue with annual health maintenance exams.   3.  Acne management per Derm.  She is using benzoyl peroxide wash, clinda lotion, and an oral abx.    4.  Patient will see one of our general surgeons if she has questions about the umbilical hernia.      Alee Duncan MD

## 2018-02-02 ASSESSMENT — PATIENT HEALTH QUESTIONNAIRE - PHQ9: SUM OF ALL RESPONSES TO PHQ QUESTIONS 1-9: 5

## 2018-02-02 ASSESSMENT — ANXIETY QUESTIONNAIRES: GAD7 TOTAL SCORE: 1

## 2018-03-12 DIAGNOSIS — E06.3 HASHIMOTO'S THYROIDITIS: ICD-10-CM

## 2018-03-12 LAB
T3 SERPL-MCNC: 103 NG/DL (ref 60–181)
T4 SERPL-MCNC: 7.8 UG/DL (ref 4.5–13.9)
TSH SERPL DL<=0.005 MIU/L-ACNC: 2.01 MU/L (ref 0.4–4)

## 2018-03-12 PROCEDURE — 84480 ASSAY TRIIODOTHYRONINE (T3): CPT | Performed by: INTERNAL MEDICINE

## 2018-03-12 PROCEDURE — 36415 COLL VENOUS BLD VENIPUNCTURE: CPT | Performed by: INTERNAL MEDICINE

## 2018-03-12 PROCEDURE — 84436 ASSAY OF TOTAL THYROXINE: CPT | Performed by: INTERNAL MEDICINE

## 2018-03-12 PROCEDURE — 84443 ASSAY THYROID STIM HORMONE: CPT | Performed by: INTERNAL MEDICINE

## 2018-04-05 NOTE — PROGRESS NOTES
Dear Lucero,     Normal thyroid function is noted. I recommend once a year thyroid function testing with your primary physician and earlier if you have any new symptoms.     Best Regards,   Santiago Escamilla MD  [id number]  Endocrinology Service

## 2018-09-21 ENCOUNTER — ALLIED HEALTH/NURSE VISIT (OUTPATIENT)
Dept: FAMILY MEDICINE | Facility: OTHER | Age: 33
End: 2018-09-21
Payer: COMMERCIAL

## 2018-09-21 DIAGNOSIS — Z23 NEED FOR PROPHYLACTIC VACCINATION AND INOCULATION AGAINST INFLUENZA: Primary | ICD-10-CM

## 2018-09-21 PROCEDURE — 90686 IIV4 VACC NO PRSV 0.5 ML IM: CPT

## 2018-09-21 PROCEDURE — 99207 ZZC NO CHARGE LOS: CPT

## 2018-09-21 PROCEDURE — 90471 IMMUNIZATION ADMIN: CPT

## 2018-09-21 NOTE — PROGRESS NOTES
Injectable Influenza Immunization Documentation    1.  Is the person to be vaccinated sick today?   No    2. Does the person to be vaccinated have an allergy to a component   of the vaccine?   No  Egg Allergy Algorithm Link    3. Has the person to be vaccinated ever had a serious reaction   to influenza vaccine in the past?   No    4. Has the person to be vaccinated ever had Guillain-Barré syndrome?   No    Form completed by Marisa Dickson CMA     Prior to injection verified patient identity using patient's name and date of birth. Patient instructed to remain in clinic for 20 minutes afterwards, and to report any adverse reaction to me immediately.

## 2018-09-21 NOTE — MR AVS SNAPSHOT
After Visit Summary   9/21/2018    Lucero Ku    MRN: [id number]           Patient Information     Date Of Birth          1985        Visit Information        Provider Department      9/21/2018 9:00 AM NL FLU SHOT [facility]        Today's Diagnoses     Need for prophylactic vaccination and inoculation against influenza    -  1       Follow-ups after your visit        Who to contact     If you have questions or need follow up information about today's clinic visit or your schedule please contact [facility] directly at [phone].  Normal or non-critical lab and imaging results will be communicated to you by [employer], letter or phone within 4 business days after the clinic has received the results. If you do not hear from us within 7 days, please contact the clinic through Talk[employer]Land[employer] or phone. If you have a critical or abnormal lab result, we will notify you by phone as soon as possible.  Submit refill requests through [employer] or call your pharmacy and they will forward the refill request to us. Please allow 3 business days for your refill to be completed.          Additional Information About Your Visit        MyChart Information     [employer] gives you secure access to your electronic health record. If you see a primary care provider, you can also send messages to your care team and make appointments. If you have questions, please call your primary care clinic.  If you do not have a primary care provider, please call [phone] and they will assist you.        Care EveryWhere ID     This is your Care EveryWhere ID. This could be used by other organizations to access your [place] medical records  [id number]         Blood Pressure from Last 3 Encounters:   02/01/18 110/70   12/11/17 120/80   12/04/17 124/82    Weight from Last 3 Encounters:   02/01/18 196 lb (88.9 kg)   12/11/17 230 lb 12 oz (104.7 kg)   12/04/17 234 lb (106.1 kg)              We  Performed the Following     FLU VACCINE, SPLIT VIRUS, IM (QUADRIVALENT) [98175]- >3 YRS     Vaccine Administration, Initial [18238]        Primary Care Provider Office Phone # Fax #    Alee Duncan [phone]       [address] [address]        Equal Access to Services     [facility]: Hadii aad ku hadasho Soomaali, waaxda luqadaha, qaybta kaalmada adeegyada, waxay miguelinain hayaan adejamey kemidalmis lamc . So [facility] [phone].    ATENCIÓN: Si habla español, tiene a ley disposición servicios gratuitos de asistencia lingüística. Beverley al [phone].    We comply with applicable federal civil rights laws and [place] laws. We do not discriminate on the basis of race, color, national origin, age, disability, sex, sexual orientation, or gender identity.            Thank you!     Thank you for choosing [facility]  for your care. Our goal is always to provide you with excellent care. Hearing back from our patients is one way we can continue to improve our services. Please take a few minutes to complete the written survey that you may receive in the mail after your visit with us. Thank you!             Your Updated Medication List - Protect others around you: Learn how to safely use, store and throw away your medicines at www.disposemymeds.org.          This list is accurate as of 9/21/18  9:56 AM.  Always use your most recent med list.                   Brand Name Dispense Instructions for use Diagnosis    norethindrone 0.35 MG per tablet    MICRONOR    84 tablet    Take 1 tablet (0.35 mg) by mouth daily    Irregular menstrual cycle       prenatal multivitamin plus iron 27-0.8 MG Tabs per tablet      Take 1 tablet by mouth daily

## 2018-12-03 ENCOUNTER — OFFICE VISIT (OUTPATIENT)
Dept: OBGYN | Facility: OTHER | Age: 33
End: 2018-12-03
Payer: COMMERCIAL

## 2018-12-03 ENCOUNTER — OFFICE VISIT (OUTPATIENT)
Dept: SURGERY | Facility: OTHER | Age: 33
End: 2018-12-03
Payer: COMMERCIAL

## 2018-12-03 VITALS
WEIGHT: 196.25 LBS | TEMPERATURE: 98.3 F | DIASTOLIC BLOOD PRESSURE: 78 MMHG | SYSTOLIC BLOOD PRESSURE: 112 MMHG | BODY MASS INDEX: 33.51 KG/M2 | HEIGHT: 64 IN

## 2018-12-03 VITALS
HEIGHT: 64 IN | BODY MASS INDEX: 33.51 KG/M2 | DIASTOLIC BLOOD PRESSURE: 78 MMHG | HEART RATE: 74 BPM | SYSTOLIC BLOOD PRESSURE: 112 MMHG | WEIGHT: 196.25 LBS

## 2018-12-03 DIAGNOSIS — E66.811 OBESITY (BMI 30.0-34.9): ICD-10-CM

## 2018-12-03 DIAGNOSIS — E28.2 PCOS (POLYCYSTIC OVARIAN SYNDROME): ICD-10-CM

## 2018-12-03 DIAGNOSIS — K42.9 UMBILICAL HERNIA WITHOUT OBSTRUCTION AND WITHOUT GANGRENE: Primary | ICD-10-CM

## 2018-12-03 DIAGNOSIS — Z01.419 WELL FEMALE EXAM WITH ROUTINE GYNECOLOGICAL EXAM: Primary | ICD-10-CM

## 2018-12-03 DIAGNOSIS — M62.08 DIASTASIS OF RECTUS ABDOMINIS: ICD-10-CM

## 2018-12-03 DIAGNOSIS — E06.3 HASHIMOTO'S THYROIDITIS: ICD-10-CM

## 2018-12-03 DIAGNOSIS — Z13.6 CARDIOVASCULAR SCREENING; LDL GOAL LESS THAN 160: ICD-10-CM

## 2018-12-03 LAB
CHOLEST SERPL-MCNC: 160 MG/DL
HDLC SERPL-MCNC: 40 MG/DL
LDLC SERPL CALC-MCNC: 95 MG/DL
NONHDLC SERPL-MCNC: 120 MG/DL
T3 SERPL-MCNC: 112 NG/DL (ref 60–181)
T4 FREE SERPL-MCNC: 0.85 NG/DL (ref 0.76–1.46)
TRIGL SERPL-MCNC: 124 MG/DL
TSH SERPL DL<=0.005 MIU/L-ACNC: 4.87 MU/L (ref 0.4–4)

## 2018-12-03 PROCEDURE — 80061 LIPID PANEL: CPT | Performed by: OBSTETRICS & GYNECOLOGY

## 2018-12-03 PROCEDURE — 84443 ASSAY THYROID STIM HORMONE: CPT | Performed by: OBSTETRICS & GYNECOLOGY

## 2018-12-03 PROCEDURE — 99204 OFFICE O/P NEW MOD 45 MIN: CPT | Performed by: SURGERY

## 2018-12-03 PROCEDURE — 84480 ASSAY TRIIODOTHYRONINE (T3): CPT | Performed by: OBSTETRICS & GYNECOLOGY

## 2018-12-03 PROCEDURE — 84439 ASSAY OF FREE THYROXINE: CPT | Performed by: OBSTETRICS & GYNECOLOGY

## 2018-12-03 PROCEDURE — 99395 PREV VISIT EST AGE 18-39: CPT | Performed by: OBSTETRICS & GYNECOLOGY

## 2018-12-03 PROCEDURE — 36415 COLL VENOUS BLD VENIPUNCTURE: CPT | Performed by: OBSTETRICS & GYNECOLOGY

## 2018-12-03 RX ORDER — DESOGESTREL AND ETHINYL ESTRADIOL 0.15-0.03
1 KIT ORAL DAILY
Qty: 84 TABLET | Refills: 3 | Status: SHIPPED | OUTPATIENT
Start: 2018-12-03 | End: 2019-12-12

## 2018-12-03 NOTE — MR AVS SNAPSHOT
After Visit Summary   12/3/2018    Lucero Ku    MRN: [id number]           Patient Information     Date Of Birth          1985        Visit Information        Provider Department      12/3/2018 8:30 AM Alee Duncan MD [facility]        Today's Diagnoses     Well female exam with routine gynecological exam    -  1    CARDIOVASCULAR SCREENING; LDL GOAL LESS THAN 160        Hashimoto's thyroiditis        PCOS (polycystic ovarian syndrome)          Care Instructions      Preventive Health Recommendations  Female Ages 26 - 39  Yearly exam:   See your health care provider every year in order to    Review health changes.     Discuss preventive care.      Review your medicines if you your doctor has prescribed any.    Until age 30: Get a Pap test every three years (more often if you have had an abnormal result).    After age 30: Talk to your doctor about whether you should have a Pap test every 3 years or have a Pap test with HPV screening every 5 years.   You do not need a Pap test if your uterus was removed (hysterectomy) and you have not had cancer.  You should be tested each year for STDs (sexually transmitted diseases), if you're at risk.   Talk to your provider about how often to have your cholesterol checked.  If you are at risk for diabetes, you should have a diabetes test (fasting glucose).  Shots: Get a flu shot each year. Get a tetanus shot every 10 years.   Nutrition:     Eat at least 5 servings of fruits and vegetables each day.    Eat whole-grain bread, whole-wheat pasta and brown rice instead of white grains and rice.    Get adequate Calcium and Vitamin D.     Lifestyle    Exercise at least 150 minutes a week (30 minutes a day, 5 days of the week). This will help you control your weight and prevent disease.    Limit alcohol to one drink per day.    No smoking.     Wear sunscreen to prevent skin cancer.    See your dentist every six months for an exam and  cleaning.            Follow-ups after your visit        Follow-up notes from your care team     Return in about 1 year (around 12/3/2019).      Your next 10 appointments already scheduled     Dec 03, 2018  9:15 AM CST   [facility]    [address]  [facility] [address]   [phone]            Dec 05, 2018  9:45 AM CST   [facility]    [address] [address] [phone]            Dec 05, 2018 10:15 AM CST   [facility] Visit with [facility] Wright [facility]   [facility]    [address] [address] [phone]              Who to contact     If you have questions or need follow up information about today's clinic visit or your schedule please contact [facility] directly at [phone].  Normal or non-critical lab and imaging results will be communicated to you by MyChart, letter or phone within 4 business days after the clinic has received the results. If you do not hear from us within 7 days, please contact the clinic through JiaThishart or phone. If you have a critical or abnormal lab result, we will notify you by phone as soon as possible.  Submit refill requests through [employer] or call your pharmacy and they will forward the refill request to us. Please allow 3 business days for your refill to be completed.          Additional Information About Your Visit        MyChart Information     [employer] gives you secure access to your electronic health record. If you see a primary care provider, you can also send messages to your care team and make appointments. If you have questions, please call your primary care clinic.  If you do not have a primary care provider, please call [phone] and they will assist you.        Care EveryWhere  "ID     This is your Care EveryWhere ID. This could be used by other organizations to access your [place] medical records  [id number]        Your Vitals Were     Pulse Height Last Period BMI (Body Mass Index)          74 5' 4.37\" (1.635 m) 11/22/2018 (Approximate) 33.3 kg/m2         Blood Pressure from Last 3 Encounters:   12/03/18 112/78   02/01/18 110/70   12/11/17 120/80    Weight from Last 3 Encounters:   12/03/18 196 lb 4 oz (89 kg)   02/01/18 196 lb (88.9 kg)   12/11/17 230 lb 12 oz (104.7 kg)              We Performed the Following     Lipid panel reflex to direct LDL Fasting     T3, total     T4, free     TSH          Today's Medication Changes          These changes are accurate as of 12/3/18  9:11 AM.  If you have any questions, ask your nurse or doctor.               Start taking these medicines.        Dose/Directions    desogestrel-ethinyl estradiol 0.15-30 MG-MCG tablet   Commonly known as:  APRI   Used for:  PCOS (polycystic ovarian syndrome)   Started by:  Alee Duncan MD        Dose:  1 tablet   Take 1 tablet by mouth daily   Quantity:  84 tablet   Refills:  3         Stop taking these medicines if you haven't already. Please contact your care team if you have questions.     norethindrone 0.35 MG tablet   Commonly known as:  MICRONOR   Stopped by:  Alee Duncan MD                Where to get your medicines      These medications were sent to [facility] - [address]  [address] [address]     Phone:  [phone]     desogestrel-ethinyl estradiol 0.15-30 MG-MCG tablet                Primary Care Provider Office Phone # Fax #    Alee Duncan [phone]       [address] [address]        Equal Access to Services     DARYL JUNG AH: Maria Guadalupe cole Sorossana, waaxda luqadaha, qaybta kaalmada adeegyaoly, annette schmitz. [facility] [phone].    ATENCIÓN: Si habla " español, tiene a ley disposición servicios gratuitos de asistencia lingüística. Beverley chen [phone].    We comply with applicable federal civil rights laws and [place] laws. We do not discriminate on the basis of race, color, national origin, age, disability, sex, sexual orientation, or gender identity.            Thank you!     Thank you for choosing [facility]  for your care. Our goal is always to provide you with excellent care. Hearing back from our patients is one way we can continue to improve our services. Please take a few minutes to complete the written survey that you may receive in the mail after your visit with us. Thank you!             Your Updated Medication List - Protect others around you: Learn how to safely use, store and throw away your medicines at www.disposemymeds.org.          This list is accurate as of 12/3/18  9:11 AM.  Always use your most recent med list.                   Brand Name Dispense Instructions for use Diagnosis    desogestrel-ethinyl estradiol 0.15-30 MG-MCG tablet    APRI    84 tablet    Take 1 tablet by mouth daily    PCOS (polycystic ovarian syndrome)       prenatal multivitamin w/iron 27-0.8 MG tablet      Take 1 tablet by mouth daily

## 2018-12-03 NOTE — LETTER
"    12/3/2018         RE: Lucero Ku  [address]  [place] [address]        Dear Colleague,    Thank you for referring your patient, Lucero Ku, to the [facility]. Please see a copy of my visit note below.    /78 (BP Location: Left arm, Patient Position: Sitting, Cuff Size: Adult Large)  Temp 98.3  F (36.8  C) (Temporal)  Ht 1.635 m (5' 4.37\")  Wt 89 kg (196 lb 4 oz)  LMP 11/22/2018 (Approximate)  BMI 33.3 kg/m2  Body mass index is 33.3 kg/(m^2).  5' 4.37\"  196 lbs 4 oz        Belkis Maynard MA on 12/3/2018 at 9:10 AM      General Surgery Consultation    Lucero Ku MRN# [id number]   Age: 33 year old YOB: 1985     Reason for consult: Tender Umbilical hernia                        Assessment and Plan:    Lucero Ku is a 33 year old female who presented as a self-referral with history, exam, laboratory and imaging most consistent with:        ICD-10-CM    1. Umbilical hernia without obstruction and without gangrene K42.9    2. Diastasis of rectus abdominis M62.08    3. Obesity (BMI 30.0-34.9) E66.9        Lucero has a very small umbilical hernia with likely diastases just above the umbilicus.  It is difficult to tell if this is a true hernia above the umbilicus however I discussed with the patient that if I repair her umbilical hernia and if there is another hernia above the umbilicus I would place a bigger mesh intraoperatively.  I would do this laparoscopically.  I explained to her the risks, benefits and alternatives of the surgery.  Patient has 2 sets of twin -and would like to have the surgery done once her younger set of twins start walking.  Thus I recommend that she make another appointment with me once she is ready.  At that appointment we will discuss the risks, benefits and alternatives.  And will have more of a comprehensive exam to see if there is any changes to her hernia.  In the meantime I recommend that the patient " undergo weight loss and also watch for any obstructive symptoms.  I explained to the patient what those symptoms are.  Patient understands.            Chief Complaint:   Tender umbilical hernia     History is obtained from the patient         History of Present Illness:   This patient is a 33 year old  female without a significant past medical history who presents with with periumbilical tenderness.  Stated this started several months ago.  Patient is status post  for twins about a year ago.  Patient stated the pain is worse when her kids jumps onto her abdomen.  There is no bulge within her umbilicus however she noted that there is a bulge above the umbilicus.  Patient denies any nausea, vomiting.  Denies any issues with bowel movements or melena or hematochezia.  She shows no sign of obstruction.  Denies any similar bulges in the past.  Denies any past surgical history except for the  x2 as she has had 2 sets of twins.  No medication except for oral contraceptive.          Past Medical History:    has a past medical history of Hashimoto's thyroiditis; Infertility, female; and PCOS (polycystic ovarian syndrome).          Past Surgical History:     Past Surgical History:   Procedure Laterality Date      SECTION  3/4/2014    Procedure:  SECTION;;  Surgeon: Paige Ly MD;  Location: [facility] L[facility]D      SECTION, TUBAL LIGATION, COMBINED  2017    [facility]. left partial salpingectomy;  right salpingectomy including fimbria      ELBOW SURGERY      Right elbow     HC TOOTH EXTRACTION W/FORCEP       HYSTEROSCOPY DIAGNOSTIC  2011           Medications:     Current Outpatient Prescriptions on File Prior to Visit:  desogestrel-ethinyl estradiol (APRI) 0.15-30 MG-MCG tablet Take 1 tablet by mouth daily   Prenatal Vit-Fe Fumarate-FA (PRENATAL MULTIVITAMIN  PLUS IRON) 27-0.8 MG TABS per tablet Take 1 tablet by mouth daily     No current facility-administered  medications on file prior to visit.       Allergies:      Allergies   Allergen Reactions     Hydrocodone-Acetaminophen Nausea and Vomiting     Reaction after taking Vicodin for tooth extraction.  Can take plain Tylenol.            Social History:   Lucero Ku  reports that she has never smoked. She has never used smokeless tobacco. She reports that she drinks alcohol. She reports that she does not use illicit drugs.          Family History:   The patient has no family history of any bleeding, clotting or anesthesia problems.          Review of Systems:     Constitutional: Denies fever or chills   Eyes: Denies change in visual acuity   HENT: Denies nasal congestion or sore throat   Respiratory: Denies cough or shortness of breath   Cardiovascular: Denies chest pain or edema   GI: Denies  nausea, vomiting, bloody stools or diarrhea; +abdominal pain  : Denies dysuria   Musculoskeletal: Denies back pain or joint pain   Integument: Denies rash   Neurologic: Denies headache, focal weakness or sensory changes   Endocrine: Denies polyuria or polydipsia   Lymphatic: Denies swollen glands   Psychiatric: Denies depression or anxiety          Physical Exam:     Constitutional: Awake, alert, no acute distress.  Eyes:  No scleral icterus.  Conjunctiva are without injection.  ENMT: Mucous membranes moist, dentition and gums are intact.   Neck: Soft, supple, trachea midline.    Endocrine: The thyroid is without masses and mobile with swallow.   Lymphatic: There is no cervical, submandibular, or inguinal adenopathy.  Respiratory: Lungs are clear to auscultation and percussion bilaterally.   Cardiovascular: Regular rate and rhythm. No murmurs, rubs, or gallops.    Abdomen: Non-distended, non-tender, normoactive bowel sounds present, No masses, small umbilical hernia - difficult to palpate the umbilical defect thus questionable if there is incarceration of preperitoneal fat within this defect.  Patient does have diastases  supraumbilically however I was able to feel a firm edge within this thus questionable hernia defect versus the lateral displacement of the rectus muscle. No hepatosplenomegaly.   Musculoskeletal: No spinal or CVA tenderness. Full range of motion in the upper and lower extremities.    Skin: No skin rashes or lesions to inspection.  No petechia.    Neurologic: Cranial nerves II through XII are grossly intact and symmetric.  Psychiatric: The patient is alert and oriented times 3.  The patient's affect is not blunted and mood is appropriate.          Data:   WBC -   WBC   Date Value Ref Range Status   05/31/2017 7.2 4.0 - 11.0 10e9/L Final   ], HgB -   Hemoglobin   Date Value Ref Range Status   09/21/2017 11.3 (L) 11.7 - 15.7 g/dL Final   ]   Liver Function Studies - No results for input(s): PROTTOTAL, ALBUMIN, BILITOTAL, ALKPHOS, AST, ALT, BILIDIRECT in the last 58639 hours.  No results found for this or any previous visit (from the past 744 hour(s)).     Tonya Moreno DO 12/3/2018 9:47 AM           Again, thank you for allowing me to participate in the care of your patient.        Sincerely,        Tonya Moreno MD

## 2018-12-03 NOTE — PROGRESS NOTES
General Surgery Consultation    Lucero Ku MRN# [id number]   Age: 33 year old YOB: 1985     Reason for consult: Tender Umbilical hernia                        Assessment and Plan:    Lucero Ku is a 33 year old female who presented as a self-referral with history, exam, laboratory and imaging most consistent with:        ICD-10-CM    1. Umbilical hernia without obstruction and without gangrene K42.9    2. Diastasis of rectus abdominis M62.08    3. Obesity (BMI 30.0-34.9) E66.9        Lucero has a very small umbilical hernia with likely diastases just above the umbilicus.  It is difficult to tell if this is a true hernia above the umbilicus however I discussed with the patient that if I repair her umbilical hernia and if there is another hernia above the umbilicus I would place a bigger mesh intraoperatively.  I would do this laparoscopically.  I explained to her the risks, benefits and alternatives of the surgery.  Patient has 2 sets of twin -and would like to have the surgery done once her younger set of twins start walking.  Thus I recommend that she make another appointment with me once she is ready.  At that appointment we will discuss the risks, benefits and alternatives.  And will have more of a comprehensive exam to see if there is any changes to her hernia.  In the meantime I recommend that the patient undergo weight loss and also watch for any obstructive symptoms.  I explained to the patient what those symptoms are.  Patient understands.            Chief Complaint:   Tender umbilical hernia     History is obtained from the patient         History of Present Illness:   This patient is a 33 year old  female without a significant past medical history who presents with with periumbilical tenderness.  Stated this started several months ago.  Patient is status post  for twins about a year ago.  Patient stated the pain is worse when her kids jumps onto her abdomen.   There is no bulge within her umbilicus however she noted that there is a bulge above the umbilicus.  Patient denies any nausea, vomiting.  Denies any issues with bowel movements or melena or hematochezia.  She shows no sign of obstruction.  Denies any similar bulges in the past.  Denies any past surgical history except for the  x2 as she has had 2 sets of twins.  No medication except for oral contraceptive.          Past Medical History:    has a past medical history of Hashimoto's thyroiditis; Infertility, female; and PCOS (polycystic ovarian syndrome).          Past Surgical History:     Past Surgical History:   Procedure Laterality Date      SECTION  3/4/2014    Procedure:  SECTION;;  Surgeon: Paige Ly MD;  [facility]      SECTION, TUBAL LIGATION, COMBINED  2017    [facility]. left partial salpingectomy;  right salpingectomy including fimbria      ELBOW SURGERY      Right elbow     HC TOOTH EXTRACTION W/FORCEP       HYSTEROSCOPY DIAGNOSTIC  2011           Medications:     Current Outpatient Prescriptions on File Prior to Visit:  desogestrel-ethinyl estradiol (APRI) 0.15-30 MG-MCG tablet Take 1 tablet by mouth daily   Prenatal Vit-Fe Fumarate-FA (PRENATAL MULTIVITAMIN  PLUS IRON) 27-0.8 MG TABS per tablet Take 1 tablet by mouth daily     No current facility-administered medications on file prior to visit.       Allergies:      Allergies   Allergen Reactions     Hydrocodone-Acetaminophen Nausea and Vomiting     Reaction after taking Vicodin for tooth extraction.  Can take plain Tylenol.            Social History:   Lucero Ku  reports that she has never smoked. She has never used smokeless tobacco. She reports that she drinks alcohol. She reports that she does not use illicit drugs.          Family History:   The patient has no family history of any bleeding, clotting or anesthesia problems.          Review of Systems:     Constitutional:  Denies fever or chills   Eyes: Denies change in visual acuity   HENT: Denies nasal congestion or sore throat   Respiratory: Denies cough or shortness of breath   Cardiovascular: Denies chest pain or edema   GI: Denies  nausea, vomiting, bloody stools or diarrhea; +abdominal pain  : Denies dysuria   Musculoskeletal: Denies back pain or joint pain   Integument: Denies rash   Neurologic: Denies headache, focal weakness or sensory changes   Endocrine: Denies polyuria or polydipsia   Lymphatic: Denies swollen glands   Psychiatric: Denies depression or anxiety          Physical Exam:     Constitutional: Awake, alert, no acute distress.  Eyes:  No scleral icterus.  Conjunctiva are without injection.  ENMT: Mucous membranes moist, dentition and gums are intact.   Neck: Soft, supple, trachea midline.    Endocrine: The thyroid is without masses and mobile with swallow.   Lymphatic: There is no cervical, submandibular, or inguinal adenopathy.  Respiratory: Lungs are clear to auscultation and percussion bilaterally.   Cardiovascular: Regular rate and rhythm. No murmurs, rubs, or gallops.    Abdomen: Non-distended, non-tender, normoactive bowel sounds present, No masses, small umbilical hernia - difficult to palpate the umbilical defect thus questionable if there is incarceration of preperitoneal fat within this defect.  Patient does have diastases supraumbilically however I was able to feel a firm edge within this thus questionable hernia defect versus the lateral displacement of the rectus muscle. No hepatosplenomegaly.   Musculoskeletal: No spinal or CVA tenderness. Full range of motion in the upper and lower extremities.    Skin: No skin rashes or lesions to inspection.  No petechia.    Neurologic: Cranial nerves II through XII are grossly intact and symmetric.  Psychiatric: The patient is alert and oriented times 3.  The patient's affect is not blunted and mood is appropriate.          Data:   WBC -   WBC   Date Value Ref  Range Status   05/31/2017 7.2 4.0 - 11.0 10e9/L Final   ], HgB -   Hemoglobin   Date Value Ref Range Status   09/21/2017 11.3 (L) 11.7 - 15.7 g/dL Final   ]   Liver Function Studies - No results for input(s): PROTTOTAL, ALBUMIN, BILITOTAL, ALKPHOS, AST, ALT, BILIDIRECT in the last 07639 hours.  No results found for this or any previous visit (from the past 744 hour(s)).     Forrest-Bev Moreno, DO 12/3/2018 9:47 AM

## 2018-12-03 NOTE — NURSING NOTE
"Chief Complaint   Patient presents with     Physical       Initial /78 (BP Location: Left arm, Patient Position: Chair, Cuff Size: Adult Regular)  Pulse 74  Ht 5' 4.37\" (1.635 m)  Wt 196 lb 4 oz (89 kg)  LMP 2018 (Approximate)  BMI 33.3 kg/m2 Estimated body mass index is 33.3 kg/(m^2) as calculated from the following:    Height as of this encounter: 5' 4.37\" (1.635 m).    Weight as of this encounter: 196 lb 4 oz (89 kg).  BP completed using cuff size: regular        The following HM Due: NONE      The following patient reported/Care Every where data was sent to:  P ABSTRACT QUALITY INITIATIVES [[id number]]       N/a    Vianey Ryan CMA  December 3, 2018           "

## 2018-12-03 NOTE — PROGRESS NOTES
"/78 (BP Location: Left arm, Patient Position: Sitting, Cuff Size: Adult Large)  Temp 98.3  F (36.8  C) (Temporal)  Ht 1.635 m (5' 4.37\")  Wt 89 kg (196 lb 4 oz)  LMP 11/22/2018 (Approximate)  BMI 33.3 kg/m2  Body mass index is 33.3 kg/(m^2).  5' 4.37\"  196 lbs 4 oz        Belkis Maynard MA on 12/3/2018 at 9:10 AM    "

## 2018-12-03 NOTE — MR AVS SNAPSHOT
After Visit Summary   12/3/2018    Lucero Ku    MRN: [id number]           Patient Information     Date Of Birth          1985        Visit Information        Provider Department      12/3/2018 9:15 AM Tonya Moreno MD [facility]        Today's Diagnoses     Umbilical hernia without obstruction and without gangrene    -  1    Diastasis of rectus abdominis        Obesity (BMI 30.0-34.9)           Follow-ups after your visit        Your next 10 appointments already scheduled     Dec 05, 2018  9:45 AM CST   [facility] Aquilino [facility]   [facility]    [address]  [facility] [address] [phone]            Dec 05, 2018 10:15 AM CST   [facility] Visit with Vamshi Wright MD   [facility]    [address]  [address]  [facility] [address] [phone]              Who to contact     If you have questions or need follow up information about today's clinic visit or your schedule please contact [facility] directly at [phone].  Normal or non-critical lab and imaging results will be communicated to you by MyChart, letter or phone within 4 business days after the clinic has received the results. If you do not hear from us within 7 days, please contact the clinic through [employer] or phone. If you have a critical or abnormal lab result, we will notify you by phone as soon as possible.  Submit refill requests through [employer] or call your pharmacy and they will forward the refill request to us. Please allow 3 business days for your refill to be completed.          Additional Information About Your Visit        MyChart Information     [employer] gives you secure access to your electronic health record. If you see a primary care provider, you can also send messages to your care team and make appointments. If you have questions, please call your primary  "care clinic.  If you do not have a primary care provider, please call [phone] and they will assist you.        Care EveryWhere ID     This is your Care EveryWhere ID. This could be used by other organizations to access your [facility] medical records  [id number]        Your Vitals Were     Temperature Height Last Period BMI (Body Mass Index)          98.3  F (36.8  C) (Temporal) 1.635 m (5' 4.37\") 11/22/2018 (Approximate) 33.3 kg/m2         Blood Pressure from Last 3 Encounters:   12/03/18 112/78   12/03/18 112/78   02/01/18 110/70    Weight from Last 3 Encounters:   12/03/18 89 kg (196 lb 4 oz)   12/03/18 89 kg (196 lb 4 oz)   02/01/18 88.9 kg (196 lb)              Today, you had the following     No orders found for display         Today's Medication Changes          These changes are accurate as of 12/3/18 10:11 AM.  If you have any questions, ask your nurse or doctor.               Start taking these medicines.        Dose/Directions    desogestrel-ethinyl estradiol 0.15-30 MG-MCG tablet   Commonly known as:  APRI   Used for:  PCOS (polycystic ovarian syndrome)   Started by:  Alee Duncan MD        Dose:  1 tablet   Take 1 tablet by mouth daily   Quantity:  84 tablet   Refills:  3         Stop taking these medicines if you haven't already. Please contact your care team if you have questions.     norethindrone 0.35 MG tablet   Commonly known as:  MICRONOR   Stopped by:  Alee Duncan MD                Where to get your medicines      These medications were sent to [facility] - [address]  [address] [place] [address]     Phone:  [phone]     desogestrel-ethinyl estradiol 0.15-30 MG-MCG tablet                Primary Care Provider Office Phone # Fax #    Alee Duncan [phone] [address] [address]        Equal Access to Services     [facility] ERICH AH: marita Qureshi, " anjelica mchughlindaemily navarrovanessa miguelinain hayaan adeeg kharash la'aan ah. So [facility] [phone].    ATENCIÓN: Si don crawford, tiene a ley disposición servicios gratuitos de asistencia lingüística. Beverley al [phone].    We comply with applicable federal civil rights laws and [place] laws. We do not discriminate on the basis of race, color, national origin, age, disability, sex, sexual orientation, or gender identity.            Thank you!     Thank you for choosing [facility]  for your care. Our goal is always to provide you with excellent care. Hearing back from our patients is one way we can continue to improve our services. Please take a few minutes to complete the written survey that you may receive in the mail after your visit with us. Thank you!             Your Updated Medication List - Protect others around you: Learn how to safely use, store and throw away your medicines at www.disposemymeds.org.          This list is accurate as of 12/3/18 10:11 AM.  Always use your most recent med list.                   Brand Name Dispense Instructions for use Diagnosis    desogestrel-ethinyl estradiol 0.15-30 MG-MCG tablet    APRI    84 tablet    Take 1 tablet by mouth daily    PCOS (polycystic ovarian syndrome)       prenatal multivitamin w/iron 27-0.8 MG tablet      Take 1 tablet by mouth daily

## 2018-12-03 NOTE — PROGRESS NOTES
SUBJECTIVE:   CC: Lucero Ku is an 33 year old woman who presents for preventive health visit.     Physical   Annual:     Getting at least 3 servings of Calcium per day:  Yes    Bi-annual eye exam:  NO    Dental care twice a year:  Yes    Sleep apnea or symptoms of sleep apnea:  Daytime drowsiness    Diet:  Regular (no restrictions)    Frequency of exercise:  None    Taking medications regularly:  Yes    Medication side effects:  None    Additional concerns today:  No    PHQ-2 Total Score: 0      No concerns    Tubal and mini pill for contraception.  Plan to change to Apri when done breast feeding.  Stopped breast feeding two days ago.      No history of GDM or GHTN    Today's PHQ-2 Score:   PHQ-2 (  [employer]) 12/3/2018   Q1: Little interest or pleasure in doing things 0   Q2: Feeling down, depressed or hopeless 0   PHQ-2 Score 0   Q1: Little interest or pleasure in doing things Not at all   Q2: Feeling down, depressed or hopeless Not at all   PHQ-2 Score 0       Abuse: Current or Past(Physical, Sexual or Emotional)- No  Do you feel safe in your environment? Yes    Social History   Substance Use Topics     Smoking status: Never Smoker     Smokeless tobacco: Never Used      Comment: No smokers in home.     Alcohol use Yes      Comment: social     Alcohol Use 12/3/2018   If you drink alcohol do you typically have greater than 3 drinks per day OR greater than 7 drinks per week? No   No flowsheet data found.    BP Readings from Last 3 Encounters:   18 112/78   18 110/70   17 120/80    Wt Readings from Last 3 Encounters:   18 196 lb 4 oz (89 kg)   18 196 lb (88.9 kg)   17 230 lb 12 oz (104.7 kg)                  Patient Active Problem List   Diagnosis     S/P  section     PCOS (polycystic ovarian syndrome)     Hashimoto's thyroiditis     Chronic rhinitis     Dysfunction of Eustachian tube, right     Segmental dysfunction of lumbar region     Segmental dysfunction of  sacral region     Segmental dysfunction of thoracic region     Mechanical back pain     Past Surgical History:   Procedure Laterality Date      SECTION  3/4/2014    Procedure:  SECTION;;  Surgeon: Paige Ly MD;  [facility]      SECTION, TUBAL LIGATION, COMBINED  2017    [facility]. left partial salpingectomy;  right salpingectomy including fimbria      ELBOW SURGERY      Right elbow     HC TOOTH EXTRACTION W/FORCEP       HYSTEROSCOPY DIAGNOSTIC  2011       Social History   Substance Use Topics     Smoking status: Never Smoker     Smokeless tobacco: Never Used      Comment: No smokers in home.     Alcohol use Yes      Comment: social     Family History   Problem Relation Age of Onset     Hypertension Mother      Hypertension Father      Cancer Father 55     Stage IV Kidney ()     Thyroid Disease Father      hypothyroidism     Diabetes Maternal Grandfather      Hypertension Maternal Grandfather      Lipids Maternal Grandfather      Defibrilator     Cancer Paternal Grandmother      Thyroid Disease Paternal Grandmother      Hypertension Paternal Grandmother      Cancer Paternal Grandfather      Thyroid Disease Paternal Grandfather      Hypertension Paternal Grandfather      Hypertension Sister      Sleep Apnea Sister      Anxiety Disorder Sister      Hypertension Brother      Anxiety Disorder Brother          Current Outpatient Prescriptions   Medication Sig Dispense Refill     desogestrel-ethinyl estradiol (APRI) 0.15-30 MG-MCG tablet Take 1 tablet by mouth daily 84 tablet 3     Prenatal Vit-Fe Fumarate-FA (PRENATAL MULTIVITAMIN  PLUS IRON) 27-0.8 MG TABS per tablet Take 1 tablet by mouth daily       Allergies   Allergen Reactions     Hydrocodone-Acetaminophen Nausea and Vomiting     Reaction after taking Vicodin for tooth extraction.  Can take plain Tylenol.     Recent Labs   Lab Test  18   0939  17   1118   14   0913   LDL   --    --    --   " 90   HDL   --    --    --   43*   TRIG   --    --    --   69   TSH  2.01  1.26   < >   --     < > = values in this interval not displayed.        Mammogram not appropriate for this patient based on age.    History of abnormal Pap smear: No   PAP / HPV 7/15/2016 1/16/2013   PAP NIL NIL       Review of Systems  CONSTITUTIONAL: NEGATIVE for fever, chills, change in weight  INTEGUMENTARU/SKIN: NEGATIVE for worrisome rashes, moles or lesions  EYES: NEGATIVE for vision changes or irritation  ENT: NEGATIVE for ear, mouth and throat problems  RESP: NEGATIVE for significant cough or SOB  BREAST: NEGATIVE for masses, tenderness or discharge  CV: NEGATIVE for chest pain, palpitations or peripheral edema  GI: NEGATIVE for nausea, abdominal pain, heartburn, or change in bowel habits  : NEGATIVE for unusual urinary or vaginal symptoms.  MUSCULOSKELETAL: NEGATIVE for significant arthralgias or myalgia  NEURO: NEGATIVE for weakness, dizziness or paresthesias  PSYCHIATRIC: NEGATIVE for changes in mood or affect     OBJECTIVE:   /78 (BP Location: Left arm, Patient Position: Chair, Cuff Size: Adult Regular)  Pulse 74  Ht 5' 4.37\" (1.635 m)  Wt 196 lb 4 oz (89 kg)  LMP 11/22/2018 (Approximate)  BMI 33.3 kg/m2  Physical Exam  Gen: Alert and oriented times 3, no acute distress.  Well developed, well nourished, pleasant.    Neck: Supple, no masses.  No thyromegaly.  Breast: deferred  Chest:  Non labored.  Clear to auscultation bilaterally.    Heart: Regular, normal S1, S2.  No murmurs.   Abdomen: Soft, nontender, nondistended.  No hepatosplenomegaly.  Umbilical hernia.    :  Normal female external genitalia.  Folliculitis.  Urethral meatus normal.  Speculum exam reveals a normal vaginal vault, normal cervix.  No abnormal discharge.  Bimanual exam reveals a normal, mobile, nontender uterus.  No cervical motion tenderness.  Adnexa nontender with no palpable masses.   Extremities:  Nontender, no edema.      ASSESSMENT/PLAN: " "      ICD-10-CM    1. Well female exam with routine gynecological exam Z01.419    2. CARDIOVASCULAR SCREENING; LDL GOAL LESS THAN 160 Z13.6 Lipid panel reflex to direct LDL Fasting   3. Hashimoto's thyroiditis E06.3 TSH     T4, free     T3, total   4. PCOS (polycystic ovarian syndrome) E28.2 desogestrel-ethinyl estradiol (APRI) 0.15-30 MG-MCG tablet       Planning to see General surgery for umbilical hernia.    Acne management - continue benzoyl peroxide.  May need to consider adding back the clinda lotion and oral abx.    Pap next year  Hashimoto's thyroiditis  - endocrinologist recommend labs per me.   Apri for acne and cycle control.  Plan to follow up in the office - spironolactone if needed in the future  Family history of kidney cancer with her father, age 55, passed age 57.  Consider referral to genetic counseling.   Supportive care for breast pain as she has recently stopped breast feeding.       COUNSELING:  Reviewed preventive health counseling, as reflected in patient instructions    BP Readings from Last 1 Encounters:   12/03/18 112/78     Estimated body mass index is 33.3 kg/(m^2) as calculated from the following:    Height as of this encounter: 5' 4.37\" (1.635 m).    Weight as of this encounter: 196 lb 4 oz (89 kg).      Weight management plan: diet, exercise, and portion control     reports that she has never smoked. She has never used smokeless tobacco.      Counseling Resources:  ATP IV Guidelines  Pooled Cohorts Equation Calculator  Breast Cancer Risk Calculator  FRAX Risk Assessment  ICSI Preventive Guidelines  Dietary Guidelines for Americans, 2010  USDA's MyPlate  ASA Prophylaxis  Lung CA Screening    Alee Duncan MD  [facility]  "

## 2018-12-05 ENCOUNTER — OFFICE VISIT (OUTPATIENT)
Dept: OTOLARYNGOLOGY | Facility: OTHER | Age: 33
End: 2018-12-05
Payer: COMMERCIAL

## 2018-12-05 ENCOUNTER — OFFICE VISIT (OUTPATIENT)
Dept: AUDIOLOGY | Facility: OTHER | Age: 33
End: 2018-12-05
Payer: COMMERCIAL

## 2018-12-05 VITALS — BODY MASS INDEX: 33.46 KG/M2 | HEART RATE: 71 BPM | WEIGHT: 196 LBS | HEIGHT: 64 IN | OXYGEN SATURATION: 98 %

## 2018-12-05 DIAGNOSIS — Z98.890 HX OF MYRINGOTOMY: Primary | ICD-10-CM

## 2018-12-05 DIAGNOSIS — H69.93 DISORDER OF BOTH EUSTACHIAN TUBES: Primary | ICD-10-CM

## 2018-12-05 PROCEDURE — 92567 TYMPANOMETRY: CPT | Performed by: AUDIOLOGIST

## 2018-12-05 PROCEDURE — 99213 OFFICE O/P EST LOW 20 MIN: CPT | Performed by: OTOLARYNGOLOGY

## 2018-12-05 PROCEDURE — 92557 COMPREHENSIVE HEARING TEST: CPT | Performed by: AUDIOLOGIST

## 2018-12-05 PROCEDURE — 99207 ZZC NO CHARGE LOS: CPT | Performed by: AUDIOLOGIST

## 2018-12-05 NOTE — PROGRESS NOTES
AUDIOLOGY REPORT:    Patient was referred from ENT by Dr. Wright for audiology evaluation. Patient has a history of bilateral PE tube placement on 6/14/2017. Post-procedure hearing evaluation on 7/24/2017 was consistent with grossly normal hearing bilaterally. Today patient reports an episode of right-sided ear pressure and pain, beginning in October while she was on a trip to [place]. She states that the symptoms have since resolved. Patient reports occasional hearing difficulty. She denies tinnitus.     Testing:    Otoscopy:   Otoscopic exam indicates PE tubes present bilaterally     Tympanograms:    RIGHT: normal eardrum mobility     LEFT: could not obtain seal    Thresholds:   Pure Tone Thresholds assessed using conventional audiometry with good  reliability from 250-8000 Hz bilaterally using insert earphones and circumaural headphones     RIGHT:  normal hearing sensitivity at all frequencies tested     LEFT:    normal hearing sensitivity at all frequencies tested     Speech Reception Threshold:    RIGHT: 5 dB HL    LEFT:   10 dB HL  Results are in agreement with pure tone average.     Word Recognition Score:     RIGHT: 100% at 50 dB HL using NU-6 recorded word list.    LEFT:   100% at 850 dB HL using NU-6 recorded word list.    Discussed results with the patient.     Patient was returned to ENT for follow up.     Mireya Herman, CCC-A  Licensed Audiologist #[id number]  12/5/2018

## 2018-12-05 NOTE — MR AVS SNAPSHOT
"              After Visit Summary   12/5/2018    Lucero Ku    MRN: [id number]           Patient Information     Date Of Birth          1985        Visit Information        Provider Department      12/5/2018 10:15 AM Vamshi Wright MD [facility]        Today's Diagnoses     Hx of myringotomy    -  1       Follow-ups after your visit        Additional Services     AUDIOLOGY ADULT REFERRAL                 Who to contact     If you have questions or need follow up information about today's clinic visit or your schedule please contact [facility] directly at [phone].  Normal or non-critical lab and imaging results will be communicated to you by Wayfairhart, letter or phone within 4 business days after the clinic has received the results. If you do not hear from us within 7 days, please contact the clinic through Wayfairhart or phone. If you have a critical or abnormal lab result, we will notify you by phone as soon as possible.  Submit refill requests through [employer] or call your pharmacy and they will forward the refill request to us. Please allow 3 business days for your refill to be completed.          Additional Information About Your Visit        MyChart Information     [employer] gives you secure access to your electronic health record. If you see a primary care provider, you can also send messages to your care team and make appointments. If you have questions, please call your primary care clinic.  If you do not have a primary care provider, please call [phone] and they will assist you.        Care EveryWhere ID     This is your Care EveryWhere ID. This could be used by other organizations to access your [place] medical records  [id number]        Your Vitals Were     Pulse Height Last Period Pulse Oximetry BMI (Body Mass Index)       71 1.635 m (5' 4.37\") 11/22/2018 (Approximate) 98% 33.26 kg/m2        Blood Pressure from Last 3 Encounters:   12/03/18 112/78 "   12/03/18 112/78   02/01/18 110/70    Weight from Last 3 Encounters:   12/05/18 88.9 kg (196 lb)   12/03/18 89 kg (196 lb 4 oz)   12/03/18 89 kg (196 lb 4 oz)              We Performed the Following     AUDIOLOGY ADULT REFERRAL        Primary Care Provider Office Phone # Fax #    Alee Duncan [phone]        [place] [id number]        Equal Access to Services     [facility] : Hadii aad ku hadasho Soomaali, waaxda luqadaha, qaybta kaalmada adeegyada, waxay idiin hayaan adeeg kharash lamc . So [facility] [phone].    ATENCIÓN: Si habla español, tiene a ley disposición servicios gratuitos de asistencia lingüística. [facility] [phone].    We comply with applicable federal civil rights laws and [place] laws. We do not discriminate on the basis of race, color, national origin, age, disability, sex, sexual orientation, or gender identity.            Thank you!     Thank you for choosing [facility]  for your care. Our goal is always to provide you with excellent care. Hearing back from our patients is one way we can continue to improve our services. Please take a few minutes to complete the written survey that you may receive in the mail after your visit with us. Thank you!             Your Updated Medication List - Protect others around you: Learn how to safely use, store and throw away your medicines at www.disposemymeds.org.          This list is accurate as of 12/5/18 11:08 AM.  Always use your most recent med list.                   Brand Name Dispense Instructions for use Diagnosis    desogestrel-ethinyl estradiol 0.15-30 MG-MCG tablet    APRI    84 tablet    Take 1 tablet by mouth daily    PCOS (polycystic ovarian syndrome)       prenatal multivitamin w/iron 27-0.8 MG tablet      Take 1 tablet by mouth daily

## 2018-12-05 NOTE — MR AVS SNAPSHOT
After Visit Summary   12/5/2018    Lucero Ku    MRN: [id number]           Patient Information     Date Of Birth          1985        Visit Information        Provider Department      12/5/2018 9:45 AM Zora Smith AuD [facility] [place]        Today's Diagnoses     Disorder of both eustachian tubes    -  1       Follow-ups after your visit        Who to contact     If you have questions or need follow up information about today's clinic visit or your schedule please contact [facility] directly at [phone].  Normal or non-critical lab and imaging results will be communicated to you by SlamDatahart, letter or phone within 4 business days after the clinic has received the results. If you do not hear from us within 7 days, please contact the clinic through [employer] or phone. If you have a critical or abnormal lab result, we will notify you by phone as soon as possible.  Submit refill requests through [employer] or call your pharmacy and they will forward the refill request to us. Please allow 3 business days for your refill to be completed.          Additional Information About Your Visit        MyChart Information     [employer] gives you secure access to your electronic health record. If you see a primary care provider, you can also send messages to your care team and make appointments. If you have questions, please call your primary care clinic.  If you do not have a primary care provider, please call [phone] and they will assist you.        Care EveryWhere ID     This is your Care EveryWhere ID. This could be used by other organizations to access your [place] medical records  [id number]        Your Vitals Were     Last Period                   11/22/2018 (Approximate)            Blood Pressure from Last 3 Encounters:   12/03/18 112/78   12/03/18 112/78   02/01/18 110/70    Weight from Last 3 Encounters:   12/05/18 196 lb (88.9 kg)   12/03/18 196 lb 4 oz (89 kg)    12/03/18 196 lb 4 oz (89 kg)              We Performed the Following     AUDIOGRAM/TYMPANOGRAM - INTERFACE     COMPREHENSIVE HEARING TEST     TYMPANOMETRY        Primary Care Provider Office Phone # Fax #    Alee Duncan [phone] [address] [place] [address]        Equal Access to Services     [facility] SIDDHARTHAARSLAN : Hadii aad ku hadasho Soomaali, waaxda luqadaha, qaybta kaalmada adeegyada, waxay idiin hayaan adeeg khchrissh la'aan ah. So [facility] [phone].    ATENCIÓN: Si habla español, tiene a ley disposición servicios gratuitos de asistencia lingüística. [facility] [phone].    We comply with applicable federal civil rights laws and [place] laws. We do not discriminate on the basis of race, color, national origin, age, disability, sex, sexual orientation, or gender identity.            Thank you!     Thank you for choosing [facility]  for your care. Our goal is always to provide you with excellent care. Hearing back from our patients is one way we can continue to improve our services. Please take a few minutes to complete the written survey that you may receive in the mail after your visit with us. Thank you!             Your Updated Medication List - Protect others around you: Learn how to safely use, store and throw away your medicines at www.disposemymeds.org.          This list is accurate as of 12/5/18 11:40 AM.  Always use your most recent med list.                   Brand Name Dispense Instructions for use Diagnosis    desogestrel-ethinyl estradiol 0.15-30 MG-MCG tablet    APRI    84 tablet    Take 1 tablet by mouth daily    PCOS (polycystic ovarian syndrome)       prenatal multivitamin w/iron 27-0.8 MG tablet      Take 1 tablet by mouth daily

## 2018-12-05 NOTE — PROGRESS NOTES
History of Present Illness - Lucero Ku is a 33 year old female presenting in clinic today for a recheck on ears. On 07/24/17 she had tubes placed. Patient reports that she flew recently and had pain in the right ear once she was close to landing.       Body mass index is 33.26 kg/(m^2).    BP Readings from Last 1 Encounters:   12/03/18 112/78       Patient declined BP in clinic today    Lucero IS NOT a smoker/uses chewing tobacco.        Past Medical History -   Past Medical History:   Diagnosis Date     Hashimoto's thyroiditis      Infertility, female     in vitro, tiwn birth     PCOS (polycystic ovarian syndrome)        Current Medications -   Current Outpatient Prescriptions:      desogestrel-ethinyl estradiol (APRI) 0.15-30 MG-MCG tablet, Take 1 tablet by mouth daily, Disp: 84 tablet, Rfl: 3     Prenatal Vit-Fe Fumarate-FA (PRENATAL MULTIVITAMIN  PLUS IRON) 27-0.8 MG TABS per tablet, Take 1 tablet by mouth daily, Disp: , Rfl:     Allergies -   Allergies   Allergen Reactions     Hydrocodone-Acetaminophen Nausea and Vomiting     Reaction after taking Vicodin for tooth extraction.  Can take plain Tylenol.       Social History -   Social History     Social History     Marital status:      Spouse name: N/A     Number of children: 2     Years of education: N/A     Occupational History      [employer]     Social History Main Topics     Smoking status: Never Smoker     Smokeless tobacco: Never Used      Comment: No smokers in home.     Alcohol use Yes      Comment: social     Drug use: No     Sexual activity: Yes     Partners: Male     Birth control/ protection: None, Female Surgical     Other Topics Concern     Parent/Sibling W/ Cabg, Mi Or Angioplasty Before 65f 55m? No      Service No     Blood Transfusions No     Caffeine Concern No     Occupational Exposure Yes     [employer] [facility] [employer] Haz[employer] No     Sleep Concern No     Stress Concern No     Weight Concern  "No     Special Diet No     Back Care No     Exercise No     Seat Belt Yes     Social History Narrative     to Talib and lives in [place].  No smokers in the home.  No concerns about domestic violence.  Has an indoor cat.  Aware of toxoplasmosis precautions.         Family History -   Family History   Problem Relation Age of Onset     Hypertension Mother      Hypertension Father      Cancer Father 55     Stage IV Kidney (9/13)     Thyroid Disease Father      hypothyroidism     Diabetes Maternal Grandfather      Hypertension Maternal Grandfather      Lipids Maternal Grandfather      Defibrilator     Cancer Paternal Grandmother      Thyroid Disease Paternal Grandmother      Hypertension Paternal Grandmother      Cancer Paternal Grandfather      Thyroid Disease Paternal Grandfather      Hypertension Paternal Grandfather      Hypertension Sister      Sleep Apnea Sister      Anxiety Disorder Sister      Hypertension Brother      Anxiety Disorder Brother        Review of Systems - As per HPI and PMHx, otherwise review of system review of the head and neck negative.    Physical Exam  Pulse 71  Ht 1.635 m (5' 4.37\")  Wt 88.9 kg (196 lb)  LMP 11/22/2018 (Approximate)  SpO2 98%  BMI 33.26 kg/m2  BMI: Body mass index is 33.26 kg/(m^2).    General - The patient is well nourished and well developed, and appears to have good nutritional status.  Alert and oriented to person and place, answers questions and cooperates with examination appropriately.    SKIN - No suspicious lesions or rashes.  Respiration - No respiratory distress.  Head and Face - Normocephalic and atraumatic, with no gross asymmetry noted of the contour of the facial features.  The facial nerve is intact, with strong symmetric movements.    Voice and Breathing - The patient was breathing comfortably without the use of accessory muscles. The patients voice was clear and strong, and had appropriate pitch and quality.    Ears - Bilateral pinna and EACs " with normal appearing overlying skin.  Bony landmarks of the ossicular chain are normal. The tympanic membrane is normal in appearance on the right. There is a tube that is beginning to extrude on the left, the tympanic membrane is normal in appearance. The tube in the right ear was completed extruded and sitting in the ear canal.  No fluid or purulence was seen in the external canal or the middle ear.     Eyes - Extraocular movements intact.  Sclera were not icteric or injected, conjunctiva were pink and moist.    Mouth - Examination of the oral cavity showed pink, healthy oral mucosa. No lesions or ulcerations noted.  The tongue was mobile and midline, and the dentition were in good condition.      Throat - The walls of the oropharynx were smooth, pink, moist, symmetric, and had no lesions or ulcerations.  The tonsillar pillars and soft palate were symmetric. The uvula was midline on elevation.    Neck - Normal midline excursion of the laryngotracheal complex during swallowing.  Full range of motion on passive movement.  Palpation of the occipital, submental, submandibular, internal jugular chain, and supraclavicular nodes did not demonstrate any abnormal lymph nodes or masses.  The carotid pulse was palpable bilaterally.  Palpation of the thyroid was soft and smooth, with no nodules or goiter appreciated.  The trachea was mobile and midline.    Nose - External contour is symmetric, no gross deflection or scars.  Nasal mucosa is pink and moist with no abnormal mucus.  The septum was midline and non-obstructive, turbinates of normal size and position.  No polyps, masses, or purulence noted on examination.    Neuro - Nonfocal neuro exam is normal, CN 2 through 12 intact, normal gait and muscle tone.      Performed in clinic today:  Audiologic Studies - An audiogram and tympanogram were performed today as part of the evaluation and personally reviewed. The tympanogram shows normal Type A curve on the right and was  unable to seal on the left, with normal canal volumes and middle ear pressures.  There is no sign of eustachian tube dysfunction or middle ear effusion.  The audiogram was also normal.  The sensorineural hearing was age-appropriate, with no evidence of conductive hearing loss or significant asymmetry.      A/P - Lucero Ku is a 33 year old female Patient presents with:  Ear Tube Follow Up    Patient still has her left PE tube and appears to be in the early stages of extrusion but still patent.  On the right tube is extruded was removed to the from the canal surrounding the lateral cerumen.  She experienced a little bit of eustachian tube dysfunction with flying but we reassured the patient no evidence of infection currently.  At this point we will just follow conservatively and see her back early next summer to make sure that the left tube is extruded.    Lucero should follow up in 6 months.      At Lucero next appointment they will need a hearing test.      Vamshi Wright MD

## 2018-12-05 NOTE — LETTER
12/5/2018         RE: Lucero Ku  [id number] [facility] [address]        Dear Colleague,    Thank you for referring your patient, Lucero Ku, to the [facility]. Please see a copy of my visit note below.    History of Present Illness - Lucero Ku is a 33 year old female presenting in clinic today for a recheck on ears. On 07/24/17 she had tubes placed. Patient reports that she flew recently and had pain in the right ear once she was close to landing.       Body mass index is 33.26 kg/(m^2).    BP Readings from Last 1 Encounters:   12/03/18 112/78       Patient declined BP in clinic today    Lucero IS NOT a smoker/uses chewing tobacco.        Past Medical History -   Past Medical History:   Diagnosis Date     Hashimoto's thyroiditis      Infertility, female     in vitro, tiwn birth     PCOS (polycystic ovarian syndrome)        Current Medications -   Current Outpatient Prescriptions:      desogestrel-ethinyl estradiol (APRI) 0.15-30 MG-MCG tablet, Take 1 tablet by mouth daily, Disp: 84 tablet, Rfl: 3     Prenatal Vit-Fe Fumarate-FA (PRENATAL MULTIVITAMIN  PLUS IRON) 27-0.8 MG TABS per tablet, Take 1 tablet by mouth daily, Disp: , Rfl:     Allergies -   Allergies   Allergen Reactions     Hydrocodone-Acetaminophen Nausea and Vomiting     Reaction after taking Vicodin for tooth extraction.  Can take plain Tylenol.       Social History -   Social History     Social History     Marital status:      Spouse name: N/A     Number of children: 2     Years of education: N/A     Occupational History      [employer]     Social History Main Topics     Smoking status: Never Smoker     Smokeless tobacco: Never Used      Comment: No smokers in home.     Alcohol use Yes      Comment: social     Drug use: No     Sexual activity: Yes     Partners: Male     Birth control/ protection: None, Female Surgical     Other Topics Concern     Parent/Sibling  "W/ Cabg, Mi Or Angioplasty Before 65f 55m? No      Service No     Blood Transfusions No     Caffeine Concern No     Occupational Exposure Yes     [employer]     Hobby Hazards No     Sleep Concern No     Stress Concern No     Weight Concern No     Special Diet No     Back Care No     Exercise No     Seat Belt Yes     Social History Narrative     to Talib and lives in [place].  No smokers in the home.  No concerns about domestic violence.  Has an indoor cat.  Aware of toxoplasmosis precautions.         Family History -   Family History   Problem Relation Age of Onset     Hypertension Mother      Hypertension Father      Cancer Father 55     Stage IV Kidney (9/13)     Thyroid Disease Father      hypothyroidism     Diabetes Maternal Grandfather      Hypertension Maternal Grandfather      Lipids Maternal Grandfather      Defibrilator     Cancer Paternal Grandmother      Thyroid Disease Paternal Grandmother      Hypertension Paternal Grandmother      Cancer Paternal Grandfather      Thyroid Disease Paternal Grandfather      Hypertension Paternal Grandfather      Hypertension Sister      Sleep Apnea Sister      Anxiety Disorder Sister      Hypertension Brother      Anxiety Disorder Brother        Review of Systems - As per HPI and PMHx, otherwise review of system review of the head and neck negative.    Physical Exam  Pulse 71  Ht 1.635 m (5' 4.37\")  Wt 88.9 kg (196 lb)  LMP 11/22/2018 (Approximate)  SpO2 98%  BMI 33.26 kg/m2  BMI: Body mass index is 33.26 kg/(m^2).    General - The patient is well nourished and well developed, and appears to have good nutritional status.  Alert and oriented to person and place, answers questions and cooperates with examination appropriately.    SKIN - No suspicious lesions or rashes.  Respiration - No respiratory distress.  Head and Face - Normocephalic and atraumatic, with no gross asymmetry noted of the contour of the facial features.  The facial nerve is intact, " with strong symmetric movements.    Voice and Breathing - The patient was breathing comfortably without the use of accessory muscles. The patients voice was clear and strong, and had appropriate pitch and quality.    Ears - Bilateral pinna and EACs with normal appearing overlying skin.  Bony landmarks of the ossicular chain are normal. The tympanic membrane is normal in appearance on the right. There is a tube that is beginning to extrude on the left, the tympanic membrane is normal in appearance. The tube in the right ear was completed extruded and sitting in the ear canal.  No fluid or purulence was seen in the external canal or the middle ear.     Eyes - Extraocular movements intact.  Sclera were not icteric or injected, conjunctiva were pink and moist.    Mouth - Examination of the oral cavity showed pink, healthy oral mucosa. No lesions or ulcerations noted.  The tongue was mobile and midline, and the dentition were in good condition.      Throat - The walls of the oropharynx were smooth, pink, moist, symmetric, and had no lesions or ulcerations.  The tonsillar pillars and soft palate were symmetric. The uvula was midline on elevation.    Neck - Normal midline excursion of the laryngotracheal complex during swallowing.  Full range of motion on passive movement.  Palpation of the occipital, submental, submandibular, internal jugular chain, and supraclavicular nodes did not demonstrate any abnormal lymph nodes or masses.  The carotid pulse was palpable bilaterally.  Palpation of the thyroid was soft and smooth, with no nodules or goiter appreciated.  The trachea was mobile and midline.    Nose - External contour is symmetric, no gross deflection or scars.  Nasal mucosa is pink and moist with no abnormal mucus.  The septum was midline and non-obstructive, turbinates of normal size and position.  No polyps, masses, or purulence noted on examination.    Neuro - Nonfocal neuro exam is normal, CN 2 through 12 intact,  normal gait and muscle tone.      Performed in clinic today:  Audiologic Studies - An audiogram and tympanogram were performed today as part of the evaluation and personally reviewed. The tympanogram shows normal Type A curve on the right and was unable to seal on the left, with normal canal volumes and middle ear pressures.  There is no sign of eustachian tube dysfunction or middle ear effusion.  The audiogram was also normal.  The sensorineural hearing was age-appropriate, with no evidence of conductive hearing loss or significant asymmetry.      A/P - Luceroyanet Ku is a 33 year old female Patient presents with:  Ear Tube Follow Up    Patient still has her left PE tube and appears to be in the early stages of extrusion but still patent.  On the right tube is extruded was removed to the from the canal surrounding the lateral cerumen.  She experienced a little bit of eustachian tube dysfunction with flying but we reassured the patient no evidence of infection currently.  At this point we will just follow conservatively and see her back early next summer to make sure that the left tube is extruded.    Lucero should follow up in 6 months.      At Lucero next appointment they will need a hearing test.      Vamshi Wright MD          Again, thank you for allowing me to participate in the care of your patient.        Sincerely,        Vamshi Wright MD, MD

## 2018-12-06 NOTE — PROGRESS NOTES
Will have a follow up visit     To clinic staff: Please have a follow up with me in 2-3 months with repeat TSH and Free T4.     Santiago Escamilla MD  [id number]  Endocrinology Service

## 2019-02-08 DIAGNOSIS — E06.3 HASHIMOTO'S THYROIDITIS: ICD-10-CM

## 2019-02-08 LAB
T4 FREE SERPL-MCNC: 0.67 NG/DL (ref 0.76–1.46)
TSH SERPL DL<=0.005 MIU/L-ACNC: 7.66 MU/L (ref 0.4–4)

## 2019-02-08 PROCEDURE — 36415 COLL VENOUS BLD VENIPUNCTURE: CPT | Performed by: INTERNAL MEDICINE

## 2019-02-08 PROCEDURE — 84443 ASSAY THYROID STIM HORMONE: CPT | Performed by: INTERNAL MEDICINE

## 2019-02-08 PROCEDURE — 84439 ASSAY OF FREE THYROXINE: CPT | Performed by: INTERNAL MEDICINE

## 2019-02-14 NOTE — RESULT ENCOUNTER NOTE
Slightly low thyroid hormone levels, we will discuss results and plan on Friday.   Santiago Escamilla MD  [id number]  Endocrinology Service

## 2019-02-15 ENCOUNTER — OFFICE VISIT (OUTPATIENT)
Dept: ENDOCRINOLOGY | Facility: CLINIC | Age: 34
End: 2019-02-15
Payer: COMMERCIAL

## 2019-02-15 VITALS
SYSTOLIC BLOOD PRESSURE: 138 MMHG | OXYGEN SATURATION: 100 % | WEIGHT: 194.45 LBS | BODY MASS INDEX: 32.4 KG/M2 | DIASTOLIC BLOOD PRESSURE: 80 MMHG | HEART RATE: 65 BPM | HEIGHT: 65 IN

## 2019-02-15 DIAGNOSIS — E06.3 HASHIMOTO'S THYROIDITIS: Primary | ICD-10-CM

## 2019-02-15 PROCEDURE — 99213 OFFICE O/P EST LOW 20 MIN: CPT | Performed by: INTERNAL MEDICINE

## 2019-02-15 ASSESSMENT — MIFFLIN-ST. JEOR: SCORE: 1588.49

## 2019-02-15 NOTE — PROGRESS NOTES
Endocrinology Follow up note:   02/15/2019      Interval history:  She had a routine thyroid labs that showed elevated TSH.  Repeat thyroid lab in 1 month showed TSH of nearly 8 with slightly reduced free T4 levels.  She feels slightly cold and is tired but attributes tiredness to caring for twin daughters.  Constipation or loose bowel movements on and off but no change recently.  No palpitations  No weight changes.  No new symptoms.    ENDO THYROID LABS-[facility] Latest Ref Rng & Units 2/8/2019 12/3/2018   TSH 0.40 - 4.00 mU/L 7.66 (H) 4.87 (H)   T4 TOTAL 4.5 - 13.9 ug/dL     T4 FREE 0.76 - 1.46 ng/dL 0.67 (L) 0.85   TRIIODOTHYRONINE(T3) 60 - 181 ng/dL  112     Assessment and plan:  Hashimoto's thyroiditis with normal thyroid function in the past.  History of postpartum thyroiditis after having twin sons in 2015.  This resolved spontaneously.  Currently abnormal TFTs which could be from postpartum thyroiditis or Hashimoto's thyroiditis.    Plan:  We discussed about treatment options.  Lucero would like to retest labs every month to observe progression.  If she has symptoms she will let us know so that treatment could be started.  I agree with the plan given the history of postpartum thyroiditis in the past.  Labs were ordered.  All questions were answered.    Santiago Escamilla MD  [id number]  Endocrinology Service      HPI:   Elevated TSH in June 2015 ( Delivered 3/4/1014).  However, she decided to wait prior to starting treatment.  TSH gradually improved and by January 2016 was normal. At that time, she just stopped breast feeding. She could have had post partum thyroiditis in resolving phase.     Patient was having fatigue and some cold intolerance along with constipation when the TSH was high.  But she was not sure if this was related to her thyroid condition.During the workup TPO antibody was checked and was found to be elevated at 1545.    TSH was normal up until December 2018 without medications.  Recent labs show  "elevated TSH with slightly low free T4 levels.    Lucero has a diagnosis of polycystic ovarian syndrome leading to infertility.  She currently off metformin.  She had twins have her in vitro fertilization. She had a successful IVF implant and is at 20 weeks of gestation (Due in December 2017)      PMH/ reviewed and includes the following--  Past Medical History:   Diagnosis Date     Hashimoto's thyroiditis      Infertility, female     in vitro, tiwn birth     PCOS (polycystic ovarian syndrome)        Soc Hx/ reviewed and includes the following--  Social History     Tobacco Use     Smoking status: Never Smoker     Smokeless tobacco: Never Used     Tobacco comment: No smokers in home.   Substance Use Topics     Alcohol use: Yes     Comment: social     Drug use: No       Fam Hx reviewed and includes the following--  Family History     Problem (# of Occurrences) Relation (Name,Age of Onset)    Anxiety Disorder (2) Sister, Brother    Cancer (3) Father (55): Stage IV Kidney (9/13), Paternal Grandmother, Paternal Grandfather    Diabetes (1) Maternal Grandfather    Hypertension (7) Mother, Father, Maternal Grandfather, Paternal Grandmother, Paternal Grandfather, Sister, Brother    Lipids (1) Maternal Grandfather: Defibrilator    Sleep Apnea (1) Sister    Thyroid Disease (3) Father: hypothyroidism, Paternal Grandmother, Paternal Grandfather          ROS: constitutional/HEENT/repro/endo reviewed completely with pertinents as above.    PE:  Gen: NAD,  /80 (BP Location: Left arm, Patient Position: Chair, Cuff Size: Adult Regular)   Pulse 65   Ht 1.644 m (5' 4.72\")   Wt 88.2 kg (194 lb 7.1 oz)   SpO2 100%   BMI 32.63 kg/m    HEENT: eomi, no scleral icteris or proptosis  Neck: s/nt.  Thyroid is palpable but without any discrete nodules.  Lymph: no cervical or supraclvicular LAD  Abd: nondistended  Ext: no tremors on outstretched hand, extremities are warm  Neuro: no resting tremor, normal gait,  nonfocal " otherwise  skin: normal temp/turgor/thickness. No rashes on exposed skin.    Data:  as above    (results reviewed/discussed/explained during her visit)

## 2019-02-15 NOTE — LETTER
2/15/2019         RE: Lucero Ku  [id number] [facility] [id number]        Dear Colleague,    Thank you for referring your patient, Lucero Ku, to the [facility]. Please see a copy of my visit note below.    Endocrinology Follow up note:   02/15/2019      Interval history:  She had a routine thyroid labs that showed elevated TSH.  Repeat thyroid lab in 1 month showed TSH of nearly 8 with slightly reduced free T4 levels.  She feels slightly cold and is tired but attributes tiredness to caring for twin daughters.  Constipation or loose bowel movements on and off but no change recently.  No palpitations  No weight changes.  No new symptoms.    ENDO THYROID LABS-[facility] Latest Ref Rng & Units 2/8/2019 12/3/2018   TSH 0.40 - 4.00 mU/L 7.66 (H) 4.87 (H)   T4 TOTAL 4.5 - 13.9 ug/dL     T4 FREE 0.76 - 1.46 ng/dL 0.67 (L) 0.85   TRIIODOTHYRONINE(T3) 60 - 181 ng/dL  112     Assessment and plan:  Hashimoto's thyroiditis with normal thyroid function in the past.  History of postpartum thyroiditis after having twin sons in 2015.  This resolved spontaneously.  Currently abnormal TFTs which could be from postpartum thyroiditis or Hashimoto's thyroiditis.    Plan:  We discussed about treatment options.  Lucero would like to retest labs every month to observe progression.  If she has symptoms she will let us know so that treatment could be started.  I agree with the plan given the history of postpartum thyroiditis in the past.  Labs were ordered.  All questions were answered.    Santiago Escamilla MD  [id number]  Endocrinology Service      HPI:   Elevated TSH in June 2015 ( Delivered 3/4/1014).  However, she decided to wait prior to starting treatment.  TSH gradually improved and by January 2016 was normal. At that time, she just stopped breast feeding. She could have had post partum thyroiditis in resolving phase.     Patient was having fatigue and some cold intolerance along with constipation when  "the TSH was high.  But she was not sure if this was related to her thyroid condition.During the workup TPO antibody was checked and was found to be elevated at 1545.    TSH was normal up until December 2018 without medications.  Recent labs show elevated TSH with slightly low free T4 levels.    Lucero has a diagnosis of polycystic ovarian syndrome leading to infertility.  She currently off metformin.  She had twins have her in vitro fertilization. She had a successful IVF implant and is at 20 weeks of gestation (Due in December 2017)      PMH/ reviewed and includes the following--  Past Medical History:   Diagnosis Date     Hashimoto's thyroiditis      Infertility, female     in vitro, tiwn birth     PCOS (polycystic ovarian syndrome)        Soc Hx/ reviewed and includes the following--  Social History     Tobacco Use     Smoking status: Never Smoker     Smokeless tobacco: Never Used     Tobacco comment: No smokers in home.   Substance Use Topics     Alcohol use: Yes     Comment: social     Drug use: No       Fam Hx reviewed and includes the following--  Family History     Problem (# of Occurrences) Relation (Name,Age of Onset)    Anxiety Disorder (2) Sister, Brother    Cancer (3) Father (55): Stage IV Kidney (9/13), Paternal Grandmother, Paternal Grandfather    Diabetes (1) Maternal Grandfather    Hypertension (7) Mother, Father, Maternal Grandfather, Paternal Grandmother, Paternal Grandfather, Sister, Brother    Lipids (1) Maternal Grandfather: Defibrilator    Sleep Apnea (1) Sister    Thyroid Disease (3) Father: hypothyroidism, Paternal Grandmother, Paternal Grandfather          ROS: constitutional/HEENT/repro/endo reviewed completely with pertinents as above.    PE:  Gen: NAD,  /80 (BP Location: Left arm, Patient Position: Chair, Cuff Size: Adult Regular)   Pulse 65   Ht 1.644 m (5' 4.72\")   Wt 88.2 kg (194 lb 7.1 oz)   SpO2 100%   BMI 32.63 kg/m     HEENT: eomi, no scleral icteris or " proptosis  Neck: s/nt.  Thyroid is palpable but without any discrete nodules.  Lymph: no cervical or supraclvicular LAD  Abd: nondistended  Ext: no tremors on outstretched hand, extremities are warm  Neuro: no resting tremor, normal gait,  nonfocal otherwise  skin: normal temp/turgor/thickness. No rashes on exposed skin.    Data:  as above    (results reviewed/discussed/explained during her visit)      Again, thank you for allowing me to participate in the care of your patient.        Sincerely,        Santiago Escamilla MD

## 2019-02-15 NOTE — NURSING NOTE
"Lucero Ku's goals for this visit include: Thyroid follow up  She requests these members of her care team be copied on today's visit information: Yes    PCP: Alee Duncan    Referring Provider:  No referring provider defined for this encounter.    /80 (BP Location: Left arm, Patient Position: Chair, Cuff Size: Adult Regular)   Pulse 65   Ht 1.644 m (5' 4.72\")   Wt 88.2 kg (194 lb 7.1 oz)   SpO2 100%   BMI 32.63 kg/m      Do you need any medication refills at today's visit? No    "

## 2019-02-15 NOTE — PATIENT INSTRUCTIONS
Labs monthly.   Call with any changes in symptoms.   Iodine supplement at 150 mcg daily. This is over the counter.

## 2019-03-08 DIAGNOSIS — E06.3 HASHIMOTO'S THYROIDITIS: ICD-10-CM

## 2019-03-08 LAB
T4 FREE SERPL-MCNC: 0.73 NG/DL (ref 0.76–1.46)
TSH SERPL DL<=0.005 MIU/L-ACNC: 8.08 MU/L (ref 0.4–4)

## 2019-03-08 PROCEDURE — 36415 COLL VENOUS BLD VENIPUNCTURE: CPT | Performed by: INTERNAL MEDICINE

## 2019-03-08 PROCEDURE — 84439 ASSAY OF FREE THYROXINE: CPT | Performed by: INTERNAL MEDICINE

## 2019-03-08 PROCEDURE — 84443 ASSAY THYROID STIM HORMONE: CPT | Performed by: INTERNAL MEDICINE

## 2019-03-12 NOTE — RESULT ENCOUNTER NOTE
Dear Lucero,   The thyroid levels have remained quite similar to previous values. We can continue to monitor this and if there is worsening (TSH > 10 or progressive lowering of Free T4) then discuss replacement. If you have worsening symptoms, do let me know.     Best regards,   Santiago Escamilla MD  [id number]  Endocrinology Service

## 2019-03-31 NOTE — PROGRESS NOTES
Presents for routine  appointment.    She had some contractions last night and the day before, about 9 min apart.  Nothing now.    No LOF/VB  ROS:   and GI  negative.     Please see Prenatal Vitals and Notes Flowsheet for objective data.    Lab Results   Component Value Date    GBS Negative 2017       A/P:  32 year old  at 37w0d       ICD-10-CM    1. Dichorionic diamniotic twin pregnancy, antepartum O30.049    2. History of  delivery affecting pregnancy O34.219          Labor precautions given.  She will go in for evaluation if her contractions are 9 min apart again.  Babies are breech/breech.    Preop done   CS next week      Alee Duncan MD       age(85 years old or older)

## 2019-04-15 DIAGNOSIS — E06.3 HASHIMOTO'S THYROIDITIS: ICD-10-CM

## 2019-04-15 LAB
T4 FREE SERPL-MCNC: 0.75 NG/DL (ref 0.76–1.46)
TSH SERPL DL<=0.005 MIU/L-ACNC: 4.73 MU/L (ref 0.4–4)

## 2019-04-15 PROCEDURE — 84439 ASSAY OF FREE THYROXINE: CPT | Performed by: INTERNAL MEDICINE

## 2019-04-15 PROCEDURE — 36415 COLL VENOUS BLD VENIPUNCTURE: CPT | Performed by: INTERNAL MEDICINE

## 2019-04-15 PROCEDURE — 84443 ASSAY THYROID STIM HORMONE: CPT | Performed by: INTERNAL MEDICINE

## 2019-04-15 NOTE — RESULT ENCOUNTER NOTE
Dear Lucero,     Labs appear slightly improved compared to the last labs. We will continue to monitor it over time without any treatment.     Best regards,   Santiago Escamilla MD  [id number]  Endocrinology Service

## 2019-05-07 ENCOUNTER — VIRTUAL VISIT (OUTPATIENT)
Dept: FAMILY MEDICINE | Facility: OTHER | Age: 34
End: 2019-05-07

## 2019-05-08 NOTE — PROGRESS NOTES
"Date:   Clinician: Monster Minler  Clinician NPI: [id number]  Patient: Lucero Ku  Patient : 1985  Patient Address: [address] [place] [id number]  Patient Phone: [phone]  Visit Protocol: UTI  Patient Summary:  Lucero is a 33 year old ( : 1985 ) female who initiated a Visit for a presumed bladder infection. When asked the question \"Please sign me up to receive news, health information and promotions from [employer].\", Lucero responded \"No\".   Her symptoms started 4-6 days ago and consist of vaginal discharge, dysuria, foul-smelling urine, feeling as if the bladder is never empty, urinary frequency, and urgency.   Symptom details     Urine color: The color of her urine is yellow.     Vaginal discharge: The discharge is white in color and is watery. The discharge is typical for her.      Denied symptoms include chills, nausea, flank pain, abdominal pain, urinary incontinence, vomiting, and vaginal itching. She does not feel feverish.   Lucero has not used any over-the-counter medications or home remedies to relieve her current symptoms.  Precipitating events  Lucero denies having a sexually transmitted disease.  Pertinent medical history  Lucero has had a bladder infection before but has not had any in the past 12 months. Her current symptoms are similar to her previous bladder infection symptoms.   She is not sure what antibiotics have been effective in treating her past bladder infections.   Lucero typically gets a yeast infection when she takes antibiotics. She has used fluconazole (Diflucan) to treat previous yeast infections. 2 doses of fluconazole (Diflucan) has typically been needed for symptoms to resolve in the past.  Lucero has not been prescribed antibiotics to prevent frequent or repeated bladder infections in the past. She has not experienced problems or side effects with any of the common antibiotics used to treat bladder infections.   Lucero does not " have a history of kidney stones. She has not used a catheter or been a patient in a hospital or nursing home in the past 2 weeks.   Lucero does not smoke or use smokeless tobacco.   She denies pregnancy and denies breastfeeding. She has menstruated in the past month.   MEDICATIONS: No current medications, ALLERGIES: Vicodin  Clinician Response:  Dear Lucero,  Based on the information you have provided, you likely have an acute urinary tract infection, also called a bladder infection. Bladder infections occur when bacteria from the outside of the body enters the urinary tract. Any part of the urinary system can be infected, but the bladder is the most common.  Medication information  I am prescribing:     Nitrofurantoin monohyd/m-cryst (Macrobid) 100 mg oral capsule. Take 1 capsule by mouth every 12 hours for 5 days. Take this medication with food. There are no refills with this prescription.   The medication I prescribed for your bladder infection is an antibiotic. Continue taking the medication until it is gone even if you feel better.   Because you usually get a yeast infection when taking antibiotics, I am also prescribing:     Fluconazole (Diflucan) 150 mg oral tablet. Take 1 tablet by mouth in a single dose. Repeat dose in 3 days if symptoms are still present. There are no refills with this prescription.   Self care  Urination helps to flush bacteria from the urinary tract. For this reason, drinking water and urinating often helps relieve some urinary symptoms and can decrease your risk of getting bladder infections in the future.  Other steps you can take to prevent future bladder infections include:     Wipe front to back after using the bathroom    Urinate after sexual intercourse    Avoid using deodorant sprays, douches, or powders in the vaginal area     When to seek care  Please make an appointment to be seen in a clinic or urgent care if any of the following occur:     You develop new symptoms or your  symptoms become worse    You have medication side effects that make it difficult to take them as prescribed    Your symptoms do not improve within 1-2 days of starting treatment    You have symptoms of a bladder infection that return shortly after completing treatment     It is possible to have an allergic reaction to an antibiotic even if you have not had one in the past. If you notice a new rash, significant swelling, or difficulty breathing, stop taking this medication immediately and go to a clinic or urgent care.   Diagnosis: Acute uncomplicated bladder infection  Diagnosis ICD: N39.0  Prescription: nitrofurantoin monohyd/m-cryst (Macrobid) 100 mg oral capsule 10 capsule, 5 days supply. Take 1 capsule by mouth every 12 hours for 5 days. Refills: 0, Refill as needed: no, Allow substitutions: yes  Prescription: fluconazole (Diflucan) 150 mg oral tablet 2 tablet, 4 days supply. Take 1 tablet by mouth in a single dose, repeat dose in 3 days if symptoms are still present. Refills: 0, Refill as needed: no, Allow substitutions: yes  Pharmacy: [facility] - [phone] - [id number] [place] [address]

## 2019-06-17 PROBLEM — O20.8 SUBCHORIONIC HEMORRHAGE OF PLACENTA IN FIRST TRIMESTER: Status: RESOLVED | Noted: 2017-05-24 | Resolved: 2018-02-01

## 2019-06-19 DIAGNOSIS — E06.3 HASHIMOTO'S THYROIDITIS: ICD-10-CM

## 2019-06-19 LAB
T4 FREE SERPL-MCNC: 0.78 NG/DL (ref 0.76–1.46)
TSH SERPL DL<=0.005 MIU/L-ACNC: 2.95 MU/L (ref 0.4–4)

## 2019-06-19 PROCEDURE — 84443 ASSAY THYROID STIM HORMONE: CPT | Performed by: INTERNAL MEDICINE

## 2019-06-19 PROCEDURE — 84439 ASSAY OF FREE THYROXINE: CPT | Performed by: INTERNAL MEDICINE

## 2019-06-19 PROCEDURE — 36415 COLL VENOUS BLD VENIPUNCTURE: CPT | Performed by: INTERNAL MEDICINE

## 2019-06-22 NOTE — RESULT ENCOUNTER NOTE
Lucero,     Labs are improving, so lets plant to continue to monitor in about 2 months or so.     Best regards,   Santiago Escamilla MD  Endocrinology Service

## 2019-08-20 DIAGNOSIS — E06.3 HASHIMOTO'S THYROIDITIS: ICD-10-CM

## 2019-08-20 LAB
T4 FREE SERPL-MCNC: 1.18 NG/DL (ref 0.76–1.46)
TSH SERPL DL<=0.005 MIU/L-ACNC: 3.08 MU/L (ref 0.4–4)

## 2019-08-20 PROCEDURE — 36415 COLL VENOUS BLD VENIPUNCTURE: CPT | Performed by: INTERNAL MEDICINE

## 2019-08-20 PROCEDURE — 84439 ASSAY OF FREE THYROXINE: CPT | Performed by: INTERNAL MEDICINE

## 2019-08-20 PROCEDURE — 84443 ASSAY THYROID STIM HORMONE: CPT | Performed by: INTERNAL MEDICINE

## 2019-08-21 ENCOUNTER — MYC MEDICAL ADVICE (OUTPATIENT)
Dept: ENDOCRINOLOGY | Facility: CLINIC | Age: 34
End: 2019-08-21

## 2019-08-21 NOTE — RESULT ENCOUNTER NOTE
Dear Lucero,     Thyroid labs continue to improve and normalize, no changes are needed, we will continue to observe, please repeat labs in about 3-6 months. If normal at that time, we will retest if there are any new symptoms.     Best regards,   Santiago Escamilla MD  Endocrinology Service

## 2019-08-22 NOTE — TELEPHONE ENCOUNTER
Yes, you can cancel the appointment, call us if there are new issues.   Santiago Escamilla MD  Endocrinology Service

## 2019-10-08 ENCOUNTER — ALLIED HEALTH/NURSE VISIT (OUTPATIENT)
Dept: FAMILY MEDICINE | Facility: OTHER | Age: 34
End: 2019-10-08
Payer: COMMERCIAL

## 2019-10-08 DIAGNOSIS — Z23 NEED FOR PROPHYLACTIC VACCINATION AND INOCULATION AGAINST INFLUENZA: Primary | ICD-10-CM

## 2019-10-08 PROCEDURE — 90686 IIV4 VACC NO PRSV 0.5 ML IM: CPT

## 2019-10-08 PROCEDURE — 90471 IMMUNIZATION ADMIN: CPT

## 2019-10-08 PROCEDURE — 99207 ZZC NO CHARGE NURSE ONLY: CPT

## 2019-11-09 ENCOUNTER — HEALTH MAINTENANCE LETTER (OUTPATIENT)
Age: 34
End: 2019-11-09

## 2019-12-12 ENCOUNTER — OFFICE VISIT (OUTPATIENT)
Dept: OBGYN | Facility: OTHER | Age: 34
End: 2019-12-12
Payer: COMMERCIAL

## 2019-12-12 VITALS
SYSTOLIC BLOOD PRESSURE: 122 MMHG | WEIGHT: 200.5 LBS | DIASTOLIC BLOOD PRESSURE: 74 MMHG | HEART RATE: 68 BPM | BODY MASS INDEX: 33.41 KG/M2 | HEIGHT: 65 IN

## 2019-12-12 DIAGNOSIS — Z12.4 ENCOUNTER FOR SCREENING FOR CERVICAL CANCER: ICD-10-CM

## 2019-12-12 DIAGNOSIS — E28.2 PCOS (POLYCYSTIC OVARIAN SYNDROME): ICD-10-CM
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
## 2019-12-12 DIAGNOSIS — Z01.419 WELL FEMALE EXAM WITH ROUTINE GYNECOLOGICAL EXAM: Primary | ICD-10-CM

## 2019-12-12 PROCEDURE — G0145 SCR C/V CYTO,THINLAYER,RESCR: HCPCS | Performed by: OBSTETRICS & GYNECOLOGY

## 2019-12-12 PROCEDURE — 87624 HPV HI-RISK TYP POOLED RSLT: CPT | Performed by: OBSTETRICS & GYNECOLOGY

## 2019-12-12 PROCEDURE — 99395 PREV VISIT EST AGE 18-39: CPT | Performed by: OBSTETRICS & GYNECOLOGY

## 2019-12-12 RX ORDER — DESOGESTREL AND ETHINYL ESTRADIOL 0.15-0.03
1 KIT ORAL DAILY
Qty: 84 TABLET | Refills: 4 | Status: SHIPPED | OUTPATIENT
Start: 2019-12-12 | End: 2020-12-30

## 2019-12-12 ASSESSMENT — MIFFLIN-ST. JEOR: SCORE: 1615.34
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
## 2019-12-12 NOTE — NURSING NOTE
"Chief Complaint   Patient presents with     Physical       Initial /74 (BP Location: Right arm, Cuff Size: Adult Regular)   Pulse 68   Ht 1.651 m (5' 5\")   Wt 90.9 kg (200 lb 8 oz)   LMP 2019   BMI 33.36 kg/m   Estimated body mass index is 33.36 kg/m  as calculated from the following:    Height as of this encounter: 1.651 m (5' 5\").    Weight as of this encounter: 90.9 kg (200 lb 8 oz).  BP completed using cuff size: regular        The following HM Due: pap smear      The following patient reported/Care Every where data was sent to:  P ABSTRACT QUALITY INITIATIVES [[id number]]       Alee Vizcarra MA on 2019 at 11:36 AM           "

## 2019-12-16 LAB
COPATH REPORT: NORMAL
PAP: NORMAL

## 2019-12-18 LAB
FINAL DIAGNOSIS: NORMAL
HPV HR 12 DNA CVX QL NAA+PROBE: NEGATIVE
HPV16 DNA SPEC QL NAA+PROBE: NEGATIVE
HPV18 DNA SPEC QL NAA+PROBE: NEGATIVE
SPECIMEN DESCRIPTION: NORMAL
SPECIMEN SOURCE CVX/VAG CYTO: NORMAL

## 2020-03-12 ENCOUNTER — VIRTUAL VISIT (OUTPATIENT)
Dept: FAMILY MEDICINE | Facility: OTHER | Age: 35
End: 2020-03-12

## 2020-03-16 NOTE — PROGRESS NOTES
"Date: 2020 10:42:49  Clinician: Analilia Fenton  Clinician NPI: [id number]  Patient: Elmira Barker  Patient : 1985  Patient Address: [address] [place] [id number]  Patient Phone: [phone]  Visit Protocol: URI  Patient Summary:  Elmira is a 34 year old ( : 1985 ) female who initiated a Visit for COVID-19 (Coronavirus) evaluation and screening. When asked the question \"Please sign me up to receive news, health information and promotions from [employer].\", Elmira responded \"No\".    Elmira states her symptoms started gradually 10-13 days ago.   Her symptoms consist of rhinitis, a cough, and nasal congestion. She is experiencing difficulty breathing due to nasal congestion but she is not short of breath.   Symptom details     Nasal secretions: The color of her mucus is green, clear, and yellow.    Cough: Elmira coughs a few times an hour and her cough is not more bothersome at night. Phlegm comes into her throat when she coughs. She believes her cough is caused by post-nasal drip. The color of the phlegm is clear, green, and yellow.      Elmira denies having wheezing, ear pain, malaise, sore throat, fever, headache, teeth pain, enlarged lymph nodes, chills, facial pain or pressure, and myalgias. She also denies double sickening (worsening symptoms after initial improvement), taking antibiotic medication for the symptoms, and having recent facial or sinus surgery in the past 60 days.   Precipitating events  She has not recently been exposed to someone with influenza. Elmira has been in close contact with the following high risk individuals: people with asthma, heart disease or diabetes, adults 65 or older, pregnant women, and children under the age of 5.   Pertinent COVID-19 (Coronavirus) information  Elmira has not traveled internationally in the last 14 days before the start of her symptoms.   Elmira has not had close contact with a laboratory confirmed positive COVID-19 patient " "within 14 days of symptom onset.   Pertinent medical history  Elmira typically gets a yeast infection when she takes antibiotics. She has used fluconazole (Diflucan) to treat previous yeast infections. 2 doses of fluconazole (Diflucan) has typically been needed for symptoms to resolve in the past.  Elmira needs a return to work/school note.   Weight: 194 lbs   Elmira does not smoke or use smokeless tobacco.   She denies pregnancy and denies breastfeeding. She has menstruated in the past month.   Additional information as reported by the patient (free text): I was in [place] last week before it was \"bad\". I've had a runny nose for over two weeks. I had it a week before I went to [place]. I've had no fever and cough occasionally with the nasal drip.  My work is wanting me to get evaluated   Weight: 194 lbs    MEDICATIONS: Juleber oral, ALLERGIES: Vicodin  Clinician Response:  Dear Elmira,        Diagnosis: Acute upper respiratory infection, unspecified  Diagnosis ICD: J06.9  "

## 2020-03-17 ENCOUNTER — MYC MEDICAL ADVICE (OUTPATIENT)
Dept: OBGYN | Facility: OTHER | Age: 35
End: 2020-03-17

## 2020-03-17 NOTE — TELEPHONE ENCOUNTER
"Patient was seen on 12/12/2019 for her annual exam with Dr. Duncan. Per the OV plan,   \"Discussed supportive care for stress, etc.  She will contact me if she would like a mental health referral for counseling or if she would like to start an serotonin specific reuptake inhibitor.  Generally I refer to family medicine for anxiety/depression, but if she would like to start with me she can.  She would need to come in for a visit and fill out a phq, blaise.\"     RN will ask patient to do a PHQ-9 and BLAISE-7 via [employer] to route to Dr. Duncan.     Alina Stauffer, RN on 3/17/2020 at 2:56 PM    "

## 2020-03-19 ENCOUNTER — E-VISIT (OUTPATIENT)
Dept: OBGYN | Facility: OTHER | Age: 35
End: 2020-03-19
Payer: COMMERCIAL

## 2020-03-19 DIAGNOSIS — F43.23 ADJUSTMENT DISORDER WITH MIXED ANXIETY AND DEPRESSED MOOD: Primary | ICD-10-CM

## 2020-03-19 PROCEDURE — 99421 OL DIG E/M SVC 5-10 MIN: CPT | Performed by: OBSTETRICS & GYNECOLOGY

## 2020-03-19 RX ORDER — SERTRALINE HYDROCHLORIDE 25 MG/1
25 TABLET, FILM COATED ORAL DAILY
Qty: 30 TABLET | Refills: 1 | Status: SHIPPED | OUTPATIENT
Start: 2020-03-19 | End: 2020-05-13

## 2020-03-19 ASSESSMENT — ANXIETY QUESTIONNAIRES
7. FEELING AFRAID AS IF SOMETHING AWFUL MIGHT HAPPEN: SEVERAL DAYS
3. WORRYING TOO MUCH ABOUT DIFFERENT THINGS: MORE THAN HALF THE DAYS
GAD7 TOTAL SCORE: 13
1. FEELING NERVOUS, ANXIOUS, OR ON EDGE: MORE THAN HALF THE DAYS
2. NOT BEING ABLE TO STOP OR CONTROL WORRYING: MORE THAN HALF THE DAYS
4. TROUBLE RELAXING: MORE THAN HALF THE DAYS
5. BEING SO RESTLESS THAT IT IS HARD TO SIT STILL: MORE THAN HALF THE DAYS
6. BECOMING EASILY ANNOYED OR IRRITABLE: MORE THAN HALF THE DAYS
GAD7 TOTAL SCORE: 13
GAD7 TOTAL SCORE: 13
7. FEELING AFRAID AS IF SOMETHING AWFUL MIGHT HAPPEN: SEVERAL DAYS

## 2020-03-19 ASSESSMENT — PATIENT HEALTH QUESTIONNAIRE - PHQ9
SUM OF ALL RESPONSES TO PHQ QUESTIONS 1-9: 13
10. IF YOU CHECKED OFF ANY PROBLEMS, HOW DIFFICULT HAVE THESE PROBLEMS MADE IT FOR YOU TO DO YOUR WORK, TAKE CARE OF THINGS AT HOME, OR GET ALONG WITH OTHER PEOPLE: SOMEWHAT DIFFICULT
SUM OF ALL RESPONSES TO PHQ QUESTIONS 1-9: 13

## 2020-03-19 NOTE — PATIENT INSTRUCTIONS
Thank you for choosing us for your care. I have placed an order for a prescription so that you can start treatment. View your full visit summary for details by clicking on the link below. Your pharmacist will able to address any questions you may have about the medication.      If you're not feeling better within 4-6 weeks please schedule an appointment.  If you feel like you just need an increase in the dose of medication, you can send a mychart or call.   You can schedule an appointment right here in [employer] [facility] [employer] [facility], or call [phone]  If the visit is for the same symptoms as your e-visit, we'll refund the cost of your e-visit if seen within seven days.      Using Antidepressants  Depression is a mood disorder that affects the way you think and feel. The most common symptom is a feeling of deep sadness. This feeling does not go away or get better on its own. But most types of depression can be helped with therapy and antidepressant medicines. (Note: This covers antidepressant use in adults only.)    What do antidepressants do?  Antidepressants restore the balance of certain chemicals in your brain to help ease your depression. You will likely feel better in 4 to 6 weeks. But you may continue taking antidepressants for a year or more to keep your symptoms from coming back. Some people with depression need to take antidepressants for life. There are several types of antidepressants. The main types are described below.  Selective serotonin reuptake inhibitors (SSRIs)  SSRIs are effective medicines for the treatment of depression. They tend to have fewer side effects than other antidepressants. Possible side effects include anxiety, trouble sleeping, nausea, diarrhea, sexual dysfunction, and headaches. In rare cases, they may make you more depressed. SSRIs shouldn t be mixed with certain other medicines. Talk with your healthcare provider about all the medicines, herbs, and supplements you are taking.  If you have side  effects  The side effects of antidepressants are usually mild. But if you have troubling side effects, call your healthcare provider. Changing the dosage or type of medicine may help. Never stop taking medicines on your own.  Date Last Reviewed: 5/1/2017 2000-2019 [employer]. [address] [place] [address]. All rights reserved. This information is not intended as a substitute for professional medical care. Always follow your healthcare professional's instructions.          Depression: Tips to Help Yourself    As your healthcare providers help treat your depression, you can also help yourself. Keep in mind that your illness affects you emotionally, physically, mentally, and socially. So full recovery will take time. Take care of your body and your soul, and be patient with yourself as you get better.  Self-care    Educate yourself. Read about treatment and medicine options. If you have the energy, attend local conferences or support groups. Keep a list of useful websites and helpful books and use them as needed. This illness is not your fault. Don t blame yourself for your depression.    Manage early symptoms. If you notice symptoms returning, experience triggers, or identify other factors that may lead to a depressive episode, get help as soon as possible. Ask trusted friends and family to monitor your behavior and let you know if they see anything of concern.    Work with your provider. Find a provider you can trust. Communicate honestly with that person and share information on your treatment for depression and your reaction to medicines.    Be prepared for a crisis. Know what to do if you experience a crisis. Keep the phone number of a crisis hotline and know the location of your community's urgent care centers and the closest emergency department.    Hold off on big decisions. Depression can cloud your judgment. So wait until you feel better before making major life decisions, such  as changing jobs, moving, or getting  or .    Be patient. Recovering from depression is a process. Don t be discouraged if it takes some time to feel better.    Keep it simple. Depression saps your energy and concentration. So you won t be able to do all the things you used to do. Set small goals and do what you can.    Be with others. Don t isolate yourself--you ll only feel worse. Try to be with other people. And take part in fun activities when you can. Go to a movie, [employer], Church service, or social event. Talk openly with people you can trust. And accept help when it s offered.  Take care of your body  People with depression often lose the desire to take care of themselves. That only makes their problems worse. During treatment and afterward, make a point to:    Exercise. It s a great way to take care of your body. And studies have shown that exercise helps fight depression.    Avoid drugs and alcohol. These may ease the pain in the short term. But they ll only make your problems worse in the long run.    Get relief from stress. Ask your healthcare provider for relaxation exercises and techniques to help relieve stress.    Eat right. A balanced and healthy diet helps keep your body healthy.  Date Last Reviewed: 1/1/2017 2000-2019 [employer]. [address] [facility], [place] [address]. All rights reserved. This information is not intended as a substitute for professional medical care. Always follow your healthcare professional's instructions.

## 2020-03-20 ASSESSMENT — PATIENT HEALTH QUESTIONNAIRE - PHQ9: SUM OF ALL RESPONSES TO PHQ QUESTIONS 1-9: 13

## 2020-03-20 ASSESSMENT — ANXIETY QUESTIONNAIRES: GAD7 TOTAL SCORE: 13

## 2020-05-13 ENCOUNTER — MYC MEDICAL ADVICE (OUTPATIENT)
Dept: OBGYN | Facility: CLINIC | Age: 35
End: 2020-05-13

## 2020-05-13 ENCOUNTER — MYC MEDICAL ADVICE (OUTPATIENT)
Dept: OBGYN | Facility: OTHER | Age: 35
End: 2020-05-13

## 2020-05-13 DIAGNOSIS — F43.23 ADJUSTMENT DISORDER WITH MIXED ANXIETY AND DEPRESSED MOOD: ICD-10-CM

## 2020-05-13 RX ORDER — SERTRALINE HYDROCHLORIDE 25 MG/1
25 TABLET, FILM COATED ORAL DAILY
Qty: 30 TABLET | Refills: 1 | Status: SHIPPED | OUTPATIENT
Start: 2020-05-13 | End: 2021-11-10

## 2020-05-13 ASSESSMENT — ANXIETY QUESTIONNAIRES
7. FEELING AFRAID AS IF SOMETHING AWFUL MIGHT HAPPEN: NOT AT ALL
GAD7 TOTAL SCORE: 4
GAD7 TOTAL SCORE: 4
2. NOT BEING ABLE TO STOP OR CONTROL WORRYING: NOT AT ALL
6. BECOMING EASILY ANNOYED OR IRRITABLE: SEVERAL DAYS
5. BEING SO RESTLESS THAT IT IS HARD TO SIT STILL: NOT AT ALL
3. WORRYING TOO MUCH ABOUT DIFFERENT THINGS: SEVERAL DAYS
4. TROUBLE RELAXING: SEVERAL DAYS
1. FEELING NERVOUS, ANXIOUS, OR ON EDGE: SEVERAL DAYS
GAD7 TOTAL SCORE: 4
7. FEELING AFRAID AS IF SOMETHING AWFUL MIGHT HAPPEN: NOT AT ALL

## 2020-05-13 ASSESSMENT — PATIENT HEALTH QUESTIONNAIRE - PHQ9
10. IF YOU CHECKED OFF ANY PROBLEMS, HOW DIFFICULT HAVE THESE PROBLEMS MADE IT FOR YOU TO DO YOUR WORK, TAKE CARE OF THINGS AT HOME, OR GET ALONG WITH OTHER PEOPLE: SOMEWHAT DIFFICULT
SUM OF ALL RESPONSES TO PHQ QUESTIONS 1-9: 6
SUM OF ALL RESPONSES TO PHQ QUESTIONS 1-9: 6

## 2020-05-13 NOTE — TELEPHONE ENCOUNTER
Patient is a 34 year old, , who was started on Zoloft on 3/19/2020.     Patient is mycharting in today inquiring about refilling her prescription.     Alina Stauffer RN on 2020 at 8:32 AM

## 2020-05-14 ASSESSMENT — PATIENT HEALTH QUESTIONNAIRE - PHQ9: SUM OF ALL RESPONSES TO PHQ QUESTIONS 1-9: 6

## 2020-05-14 ASSESSMENT — ANXIETY QUESTIONNAIRES: GAD7 TOTAL SCORE: 4

## 2020-07-08 ENCOUNTER — MYC MEDICAL ADVICE (OUTPATIENT)
Dept: OBGYN | Facility: OTHER | Age: 35
End: 2020-07-08

## 2020-07-08 NOTE — TELEPHONE ENCOUNTER
Patient's tablets are scored, so she will take a half tab daily for 1-2 weeks, then stop.  Patient in is agreement with plan.

## 2020-07-08 NOTE — TELEPHONE ENCOUNTER
Pt last seen on 12/12/2019 for annual exam.    Pt currently taking Zoloft 25 MG 1x daily.    Pt wanting to taper off medication and is wondering if she would be able to go back on it in the future if needed.    RN routing to provider for advisement or if pt needs phone/clinic appt to discuss.    Gloria Rodríguez RN on 7/8/2020 at 10:39 AM

## 2020-09-15 ENCOUNTER — OFFICE VISIT (OUTPATIENT)
Dept: FAMILY MEDICINE | Facility: OTHER | Age: 35
End: 2020-09-15
Payer: COMMERCIAL

## 2020-09-15 VITALS
WEIGHT: 204 LBS | OXYGEN SATURATION: 98 % | TEMPERATURE: 97.3 F | HEIGHT: 65 IN | BODY MASS INDEX: 33.99 KG/M2 | RESPIRATION RATE: 16 BRPM

## 2020-09-15 DIAGNOSIS — S86.112A STRAIN OF GASTROCNEMIUS MUSCLE OF LEFT LOWER EXTREMITY, INITIAL ENCOUNTER: Primary | ICD-10-CM

## 2020-09-15 PROCEDURE — 99213 OFFICE O/P EST LOW 20 MIN: CPT | Performed by: PHYSICIAN ASSISTANT

## 2020-09-15 ASSESSMENT — PAIN SCALES - GENERAL: PAINLEVEL: SEVERE PAIN (6)

## 2020-09-15 ASSESSMENT — MIFFLIN-ST. JEOR: SCORE: 1631.22

## 2020-09-15 NOTE — PATIENT INSTRUCTIONS
- Elevate when able if swollen.   - Ok to ACE bandage, wrap from furthest upwards  - Ice/heat whichever feels best  - Massage.   - Activity as tolerated.   - Ibuprofen/tylenol as needed.   - Slow to return to normal activities, stretching lightly.     Let me know if not improving in the next week, sooner if worse.

## 2020-09-15 NOTE — PROGRESS NOTES
"Subjective     Lucero Ku is a 34 year old female who presents to clinic today for the following health issues:    HPI       Musculoskeletal problem/pain  Onset/Duration: x 2 days  Description  Location: lower leg - left  Joint Swelling: YES  Redness: YES  Pain: YES  Warmth: YES  Intensity:  moderate, 6/10  Progression of Symptoms:  Improving- from 2 nights ago  Accompanying signs and symptoms:   Fevers: no  Numbness/tingling/weakness: YES- weakness  History  Trauma to the area: YES- working out  Recent illness:  no  Previous similar problem: no  Previous evaluation:  no  Precipitating or alleviating factors:  Aggravating factors include: none  Therapies tried and outcome: rest/inactivity, ice and Tylenol    Two days ago she was working out at home and performing a  move and had her weight on her left leg with her right leg behind her when she heard a pop.  She was convinced that it was a toy hockey stick that she hit and hit her leg but it was sitting in front of her not touching her. Her  even heard the pop.  She says she immediately went down when it happened.   Pain mainly with activity, hard to walk.  Her knee and ankle feel fine.  She has had swelling but no bruising or redness.  No numbness/tingling.     Review of Systems   Constitutional,  musculoskeletal, neuro, skin systems are negative, except as otherwise noted.      Objective    Temp 97.3  F (36.3  C) (Temporal)   Resp 16   Ht 1.651 m (5' 5\")   Wt 92.5 kg (204 lb)   SpO2 98%   BMI 33.95 kg/m    Body mass index is 33.95 kg/m .  Physical Exam   GENERAL: healthy, alert and no distress  MS: normal muscle tone and mild non-pitting edema of the left lower leg compared to the right.  Tenderness to palpation over the medial calf muscle on the left side and posterior calf muscle, non-tender along the lateral left calf and the anterior lower leg, non-tender over the left side popliteal fossa, patella, medial and lateral knee joint, " ankle joint. Achilles tendon palpable bilaterally.  Montes test negative bilaterally.   SKIN: no suspicious lesions or rashes  NEURO: Normal strength and tone, mentation intact and speech normal  PSYCH: mentation appears normal, affect normal/bright    No results found for this or any previous visit (from the past 24 hour(s)).        Assessment & Plan     Lucero was seen today for leg injury.    Diagnoses and all orders for this visit:    Strain of gastrocnemius muscle of left lower extremity, initial encounter        High suspicion for gastrocnemius strain versus tear.  Achilles tendon rupture less concerning with negative Montes and normal to palpation, could be potential strain/sprain versus partial tear as well.  Low suspicion for popliteal cyst rupture based on exam findings.  Low suspicion for clots based on exam as well - very mild edema, no erythema, and the location of tenderness.      Her symptoms have been improving since it initially happened. Recommended activity modification, light stretching, heat/ice, massage, ibuprofen/tylenol, and slow return to normal activities.  If not improving then recommend evaluation with Sports Med/Ortho.     Options for treatment and follow-up care were reviewed with the patient and/or guardian. Patient and/or guardian engaged in the decision making process and verbalized understanding of the options discussed and agreed with the final plan.    Return in about 1 week (around 9/22/2020) for If not improving, sooner if worse or new concerns.    Cayden Wolf PA-C  [facility]

## 2020-09-29 NOTE — MR AVS SNAPSHOT
After Visit Summary   2/1/2018    Lucero Ku    MRN: [id number]           Patient Information     Date Of Birth          1985        Visit Information        Provider Department      2/1/2018 9:00 AM Alee Duncan MD [facility]        Today's Diagnoses     Routine postpartum follow-up    -  1    Irregular menstrual cycle           Follow-ups after your visit        Who to contact     If you have questions or need follow up information about today's clinic visit or your schedule please contact [facility] directly at [phone].  Normal or non-critical lab and imaging results will be communicated to you by [employer], letter or phone within 4 business days after the clinic has received the results. If you do not hear from us within 7 days, please contact the clinic through [employer] or phone. If you have a critical or abnormal lab result, we will notify you by phone as soon as possible.  Submit refill requests through [employer] or call your pharmacy and they will forward the refill request to us. Please allow 3 business days for your refill to be completed.          Additional Information About Your Visit        MyChart Information     [employer] gives you secure access to your electronic health record. If you see a primary care provider, you can also send messages to your care team and make appointments. If you have questions, please call your primary care clinic.  If you do not have a primary care provider, please call [phone] and they will assist you.        Care EveryWhere ID     This is your Care EveryWhere ID. This could be used by other organizations to access your [facility] medical records  [id number]        Your Vitals Were     Pulse Last Period BMI (Body Mass Index)             82 03/26/2017 (Exact Date) 33.91 kg/m2          Blood Pressure from Last 3 Encounters:   02/01/18 110/70   12/11/17 120/80   12/04/17 124/82    Weight from Last 3  Ms. Mane underwent LIGIA without evidence of DILIP thrombus.  Proceeded with DCCV 200 J x 1 shock, converting from AF to sinus rhythm. She tolerated the procedure without any acute complications. Post-DCCV ECG revealing of sinus bradycardia at 49 bpm.  Plan of care and discharge instructions discussed with Ms. Mane and her  prior to discharge. She was discharged home in stable condition.        Encounters:   02/01/18 196 lb (88.9 kg)   12/11/17 230 lb 12 oz (104.7 kg)   12/04/17 234 lb (106.1 kg)              Today, you had the following     No orders found for display         Today's Medication Changes          These changes are accurate as of 2/1/18  9:22 AM.  If you have any questions, ask your nurse or doctor.               Start taking these medicines.        Dose/Directions    norethindrone 0.35 MG per tablet   Commonly known as:  MICRONOR   Used for:  Irregular menstrual cycle   Started by:  Alee Duncan MD        Dose:  1 tablet   Take 1 tablet (0.35 mg) by mouth daily   Quantity:  84 tablet   Refills:  3            Where to get your medicines      These medications were sent to [facility] [place] - [address]   [address] [place] [address]     Phone:  [phone]     norethindrone 0.35 MG per tablet                Primary Care Provider Office Phone # Fax #    Alee Duncan [phone]       [address] [place] [address]        Equal Access to Services     [facility] AH: Hadii aad ku hadasho Soomaali, waaxda luqadaha, qaybta kaalmada adeegyada, ann[place] park . So [facility] [phone].    ATENCIÓN: Si habla español, tiene a ley disposición servicios gratuitos de asistencia lingüística. [facility] [phone].    We comply with applicable federal civil rights laws and [place] laws. We do not discriminate on the basis of race, color, national origin, age, disability, sex, sexual orientation, or gender identity.            Thank you!     Thank you for choosing [facility]  for your care. Our goal is always to provide you with excellent care. Hearing back from our patients is one way we can continue to improve our services. Please take a few minutes to complete the written survey that you may receive in the mail after your visit with us. Thank you!             Your Updated Medication  List - Protect others around you: Learn how to safely use, store and throw away your medicines at www.disposemymeds.org.          This list is accurate as of 2/1/18  9:22 AM.  Always use your most recent med list.                   Brand Name Dispense Instructions for use Diagnosis    norethindrone 0.35 MG per tablet    MICRONOR    84 tablet    Take 1 tablet (0.35 mg) by mouth daily    Irregular menstrual cycle       prenatal multivitamin plus iron 27-0.8 MG Tabs per tablet      Take 1 tablet by mouth daily

## 2020-10-16 ENCOUNTER — ALLIED HEALTH/NURSE VISIT (OUTPATIENT)
Dept: FAMILY MEDICINE | Facility: OTHER | Age: 35
End: 2020-10-16
Payer: COMMERCIAL

## 2020-10-16 DIAGNOSIS — Z23 NEED FOR PROPHYLACTIC VACCINATION AND INOCULATION AGAINST INFLUENZA: Primary | ICD-10-CM

## 2020-10-16 PROCEDURE — 99207 PR NO CHARGE NURSE ONLY: CPT

## 2020-10-16 PROCEDURE — 90471 IMMUNIZATION ADMIN: CPT

## 2020-10-16 PROCEDURE — 90686 IIV4 VACC NO PRSV 0.5 ML IM: CPT

## 2020-11-22 ENCOUNTER — VIRTUAL VISIT (OUTPATIENT)
Dept: FAMILY MEDICINE | Facility: OTHER | Age: 35
End: 2020-11-22
Payer: COMMERCIAL

## 2020-11-22 PROCEDURE — 99421 OL DIG E/M SVC 5-10 MIN: CPT | Performed by: PHYSICIAN ASSISTANT

## 2020-11-23 DIAGNOSIS — Z20.822 SUSPECTED COVID-19 VIRUS INFECTION: Primary | ICD-10-CM

## 2020-11-23 NOTE — PROGRESS NOTES
"Date: 2020 20:53:33  Clinician: Celeste Babcock  Clinician NPI: [id number]  Patient: Elmira Barker  Patient : 1985  Patient Address: [address] [place], [place] [address] [address]  Patient Phone: [phone]  Visit Protocol: URI  Patient Summary:  Elmira is a 35 year old ( : 1985 ) female who initiated a [facility] Visit for COVID-19 (Coronavirus) evaluation and screening. When asked the question \"Please sign me up to receive news, health information and promotions from [facility].\", Elmira responded \"No\".    Elmira states her symptoms started suddenly 3-4 days ago.   Her symptoms consist of facial pain or pressure, ear pain, a headache, and nasal congestion.   Symptom details     Nasal secretions: The color of her mucus is clear.    Facial pain or pressure: The facial pain or pressure feels worse when bending over or leaning forward.     Headache: She states the headache is moderate (4-6 on a 10 point pain scale).      Elmira denies having vomiting, rhinitis, myalgias, chills, malaise, sore throat, teeth pain, ageusia, diarrhea, wheezing, fever, cough, nausea, and anosmia. She also denies taking antibiotic medication in the past month, having recent facial or sinus surgery in the past 60 days, and double sickening (worsening symptoms after initial improvement). She is not experiencing dyspnea.    Pertinent COVID-19 (Coronavirus) information  Elmira does not work or volunteer as healthcare worker or a . In the past 14 days, Elmira has not worked or volunteered at a healthcare facility or group living setting.   In the past 14 days, she also has not lived in a congregate living setting.   Elmira has not had a close contact with a laboratory-confirmed COVID-19 patient within 14 days of symptom onset.    Since 2019, Elmira has not been tested for COVID-19 and has not had upper respiratory infection or influenza-like illness.   Pertinent medical history  Elmira typically gets a " yeast infection when she takes antibiotics. She has used fluconazole (Diflucan) to treat previous yeast infections. 2 doses of fluconazole (Diflucan) has typically been needed for symptoms to resolve in the past.  Elmira needs a return to work/school note.   Weight: 195 lbs   Elmira does not smoke or use smokeless tobacco.   She denies pregnancy and denies breastfeeding. She has menstruated in the past month.   Weight: 195 lbs    MEDICATIONS: Juleber oral, ALLERGIES: Vicodin  Clinician Response:  Dear Elmira,  Based on the information provided, you have a viral upper respiratory infection, otherwise known as a cold. Symptoms vary from person to person, but can include sneezing, coughing, a runny nose, sore throat, and headache and range from mild to severe.  Unfortunately, there are no medications that can cure a cold, so treatment is focused on controlling symptoms as much as possible. Most people gradually feel better until symptoms are gone in 1-2 weeks.  Medication information  Because you have a viral infection, antibiotics will not help you get better. Treating a viral infection with antibiotics could actually make you feel worse.  Self care  Steps you can take to be as comfortable as possible:     Rest.    Drink plenty of fluids.    Take a warm shower to loosen congestion    Use a cool-mist humidifier.     When to seek care  Please be seen in a clinic or urgent care if any of the following occur:   New symptoms develop, or symptoms become worse   Call ahead before going to the clinic or urgent care.  Additional treatment plan   Your symptoms show that you may have coronavirus (COVID-19). This illness can cause fever, cough and trouble breathing. Many people get a mild case and get better on their own. Some people can get very sick.  Based on the symptoms you have shared, I would like you to be re-checked in 2 to 3 days. Please call your family clinic to set up a video or phone visit.  Will I be tested for  "COVID-19?  We would like to test you for this virus.   Please call [phone] to schedule your visit. Explain that [facility] were [facility]. Be ready to share your [facility] visit ID number.   * If you need to schedule in [place] or [facility] please call [phone] or for [employer] employees please call [phone].    The following will serve as your written order for this COVID Test, ordered by me, for the indication of suspected COVID [Z20.828]: The test will be ordered in [employer], our electronic health record, after you are scheduled. It will show as ordered and authorized by Boaz Monteiro MD.  Order: COVID-19 (Coronavirus) PCR for SYMPTOMATIC testing from [facility].   1.When it's time for your COVID test:   Stay at least 6 feet away from others. (If someone will drive you to your test, stay in the backseat, as far away from the  as you can.)   Cover your mouth and nose with a mask, tissue or washcloth.  Go straight to the testing site. Don't make any stops on the way there or back.      2.Starting now: Stay home and away from others (self-isolate) until:   You've had no fever---and no medicine that reduces fever---for one full day (24 hours). And...   Your other symptoms have gotten better. For example, your cough or breathing has improved. And...   At least 10 days have passed since your symptoms started.       During this time, don't leave the house except for testing or medical care.   Stay in your own room, even for meals. Use your own bathroom if you can.   Stay away from others in your home. No hugging, kissing or shaking hands. No visitors.  Don't go to work, school or anywhere else.    Clean \"high touch\" surfaces often (doorknobs, counters, handles, etc.). Use a household cleaning spray or wipes. You'll find a full list of  on the EPA website: www.epa.gov/pesticide-registration/list-n-disinfectants-use-against-sars-cov-2.   Cover your mouth and nose with a mask, " tissue or washcloth to avoid spreading germs.  Wash your hands and face often. Use soap and water.  Caregivers in these groups are at risk for severe illness due to COVID-19:  o People 65 years and older  o People who live in a nursing home or long-term care facility  o People with chronic disease (lung, heart, cancer, diabetes, kidney, liver, immunologic)   o People who have a weakened immune system, including those who:   Are in cancer treatment  Take medicine that weakens the immune system, such as corticosteroids  Had a bone marrow or organ transplant  Have an immune deficiency  Have poorly controlled HIV or AIDS  Are obese (body mass index of 40 or higher)  Smoke regularly   o Caregivers should wear gloves while washing dishes, handling laundry and cleaning bedrooms and bathrooms.  o Use caution when washing and drying laundry: Don't shake dirty laundry, and use the warmest water setting that you can.  o For more tips, go to www.cdc.gov/coronavirus/2019-ncov/downloads/10Things.pdf.      How can I take care of myself?   Get lots of rest. Drink extra fluids (unless a doctor has told you not to)   Take Tylenol (acetaminophen) for fever or pain. If you have liver or kidney problems, ask your family doctor if it's okay to take Tylenol.   Adults can take either:    650 mg (two 325 mg pills) every 4 to 6 hours, or...   1,000 mg (two 500 mg pills) every 8 hours as needed.    Note: Don't take more than 3,000 mg in one day. Acetaminophen is found in many medicines (both prescribed and over-the-counter medicines). Read all labels to be sure you don't take too much.   For children, check the Tylenol bottle for the right dose. The dose is based on the child's age or weight.    If you have other health problems (like cancer, heart failure, an organ transplant or severe kidney disease): Call your specialty clinic if you don't feel better in the next 2 days.       Know when to call 911. Emergency warning signs include:     Trouble breathing or shortness of breath Pain or pressure in the chest that doesn't go away Feeling confused like you haven't felt before, or not being able to wake up Bluish-colored lips or face  Where can I get more information?   [facility] -- About COVID-19: [email or web address]   CDC -- What to Do If You're Sick: www.cdc.gov/coronavirus/2019-ncov/about/steps-when-sick.html   CDC -- Ending Home Isolation: www.cdc.gov/coronavirus/2019-ncov/hcp/disposition-in-home-patients.html   CDC -- Caring for Someone: www.cdc.gov/coronavirus/2019-ncov/if-you-are-sick/care-for-someone.html   [facility] -- Interim Guidance for Hospital Discharge to Home: www.[facility].[place]/diseases/coronavirus/hcp/hospdischarge.pdf   [facility] clinical trials (COVID-19 research studies): clinicalaffairs.[facility]-clinical-trials    Below are the COVID-19 hotlines at the [place] Department of Health ([facility]). Interpreters are available.    For health questions: Call 800-900-6996 or 1-707.984.5813 (7 a.m. to 7 p.m.) For questions about schools and childcare: Call 798-950-2251 or 1-703.196.2254 (7 a.m. to 7 p.m.)       Diagnosis: Contact with and (suspected) exposure to other viral communicable diseases  Diagnosis ICD: Z20.828

## 2020-12-29 DIAGNOSIS — E28.2 PCOS (POLYCYSTIC OVARIAN SYNDROME): ICD-10-CM

## 2020-12-29 NOTE — PROGRESS NOTES
"Lucero Ku is a 35 year old female who is being evaluated via a billable video visit.      The patient has been notified of following:     \"This video visit will be conducted via a call between you and your physician/provider. We have found that certain health care needs can be provided without the need for an in-person physical exam.  This service lets us provide the care you need with a video conversation.  If a prescription is necessary we can send it directly to your pharmacy.  If lab work is needed we can place an order for that and you can then stop by our lab to have the test done at a later time.    Video visits are billed at different rates depending on your insurance coverage.  Please reach out to your insurance provider with any questions.    If during the course of the call the physician/provider feels a video visit is not appropriate, you will not be charged for this service.\"    Patient has given verbal consent for Video visit? {YES-NO  Default Yes:4444::\"Yes\"}  How would you like to obtain your AVS? {AVS Preference:713205}  If you are dropped from the video visit, the video invite should be resent to: {video visit invitation:711710}  Will anyone else be joining your video visit? {:856639}  {If patient encounters technical issues they should call [phone] :168585}    Subjective     Lucero Ku is a 35 year old female who presents today via video visit for the following health issues:    HPI     {SUPERLIST (Optional):655147}  {PEDS Chronic and Acute Problems (Optional):770729}     Video Start Time: {video visit start/end time for provider to select:192847}    {additonal problems for provider to add (Optional):680236}    Review of Systems   {ROS COMP (Optional):735515}      Objective           Vitals:  No vitals were obtained today due to virtual visit.    Physical Exam     {video visit exam brief selected:799652::\"GENERAL: Healthy, alert and no distress\",\"EYES: Eyes grossly normal to " "inspection.  No discharge or erythema, or obvious scleral/conjunctival abnormalities.\",\"RESP: No audible wheeze, cough, or visible cyanosis.  No visible retractions or increased work of breathing.  \",\"SKIN: Visible skin clear. No significant rash, abnormal pigmentation or lesions.\",\"NEURO: Cranial nerves grossly intact.  Mentation and speech appropriate for age.\",\"PSYCH: Mentation appears normal, affect normal/bright, judgement and insight intact, normal speech and appearance well-groomed.\"}      {Diagnostic Test Results (Optional):825287}        {PROVIDER CHARTING PREFERENCE:147817}      Video-Visit Details    Type of service:  Video Visit    Video End Time:{video visit start/end time for provider to select:152948}    Originating Location (pt. Location): {video visit patient location:826446::\"Home\"}    Distant Location (provider location):  [facility]     Platform used for Video Visit: {Virtual Visit Platforms:981015::\"[employer]\"}          "

## 2020-12-30 RX ORDER — DESOGESTREL AND ETHINYL ESTRADIOL 0.15-0.03
1 KIT ORAL DAILY
Qty: 84 TABLET | Refills: 4 | Status: SHIPPED | OUTPATIENT
Start: 2020-12-30 | End: 2021-11-10

## 2020-12-30 NOTE — TELEPHONE ENCOUNTER
Prescription approved per [facility] Refill Protocol.  Supa Singh RN  December 30, 2020

## 2021-01-04 ENCOUNTER — VIRTUAL VISIT (OUTPATIENT)
Dept: FAMILY MEDICINE | Facility: OTHER | Age: 36
End: 2021-01-04
Payer: COMMERCIAL

## 2021-01-04 DIAGNOSIS — G89.29 CHRONIC INTRACTABLE HEADACHE, UNSPECIFIED HEADACHE TYPE: ICD-10-CM

## 2021-01-04 DIAGNOSIS — R51.9 CHRONIC INTRACTABLE HEADACHE, UNSPECIFIED HEADACHE TYPE: ICD-10-CM

## 2021-01-04 DIAGNOSIS — J34.89 SINUS PAIN: ICD-10-CM

## 2021-01-04 DIAGNOSIS — U07.1 2019 NOVEL CORONAVIRUS DISEASE (COVID-19): Primary | ICD-10-CM

## 2021-01-04 PROCEDURE — 99214 OFFICE O/P EST MOD 30 MIN: CPT | Mod: 95 | Performed by: FAMILY MEDICINE

## 2021-01-04 RX ORDER — DEXAMETHASONE 6 MG/1
6 TABLET ORAL DAILY
Qty: 5 TABLET | Refills: 0 | Status: SHIPPED | OUTPATIENT
Start: 2021-01-04 | End: 2021-02-22

## 2021-01-04 NOTE — PROGRESS NOTES
"Lucero Ku is a 35 year old female who is being evaluated via a billable video visit.      How would you like to obtain your AVS? MyChart  If the video visit is dropped, the invitation should be resent by: Text to cell phone: [phone]  Will anyone else be joining your video visit? No      Video Start Time: 2:17 PM  Assessment & Plan       ICD-10-CM    1. 2019 novel coronavirus disease (COVID-19)  U07.1 dexamethasone (DECADRON) 6 MG tablet   2. Sinus pain  J34.89 dexamethasone (DECADRON) 6 MG tablet   3. Chronic intractable headache, unspecified headache type  R51.9 dexamethasone (DECADRON) 6 MG tablet    G89.29       Overall, I suspect patient is having residual inflammation after her COVID-19 infection.  Sinusitis is a less likely etiology of her symptoms.  I recommended a trial of Decadron to help with any residual inflammation.  If not responding to this, we can consider Topamax as a preventative agent for her daily headaches.  If still not responsive, we should consider referral to neurology or the post Covid care clinic.    Portions of this note were completed using Dragon dictation software.  Although reviewed, there may be typographical and other inadvertent errors that remain.       Review of the result(s) of each unique test - COVID             BMI:   Estimated body mass index is 33.95 kg/m  as calculated from the following:    Height as of 9/15/20: 1.651 m (5' 5\").    Weight as of 9/15/20: 92.5 kg (204 lb).   Weight management plan: Discussed healthy diet and exercise guidelines      Patient Instructions   Thank you for visiting [facility]    Take the Decadron until its gone.  Let me know if any concerning side effects.    Okay to use Tylenol and/or ibuprofen to help with headache when they occur.  Try to ensure you are getting adequate sleep.  Stay hydrated.    If you develop additional sinus symptoms, let me know.    If continued bothersome headaches next week, let me know.  " We will go ahead and start Topamax at that time.  If we do need to start Topamax, please follow-up with me in 1 month to ensure that you are responding well and getting some improvement.    There are some other treatment options we could consider after this if not responding to either Decadron or Topamax.    Contact us or return if questions or concerns.     Have a nice day!    Dr. Davalos     No follow-ups on file.      If you need medication refills, please contact your pharmacy 3 days before your prescriptions runs out or download the [facility] miguel for your smart phone.   If you are out of refills, your pharmacy will contact contact the clinic.                                     [employer] assistance [phone]                       Return in about 4 weeks (around 2/1/2021) for Recheck, E-visit, video, or phone visit.    Elmer Davalos MD, MD  [facility]      Lucero PROSPER Elmira is a 35 year old female who presents for video visit    HPI     COVID f/u  Diagnosed 11/23/2020.  She got a test due to HA.      There had been some sinus symptoms.  But got the COVID test.  She did the rapid testing center in [place].  Probably an antigen test.      Still having headaches daily since her diagnosis. Pain is usually across the forehead and around eyes. Bright lights and noises bother her.  Throbbing and squeezing pain.  Occassionally neck pain as well.      Also has no taste or smell.  She lost this 5 days into her illness.      Hasn't had any treatments other than otc analgesics.      Will occasionally wake up at night with a pounding HA in the eyeballs.  No vision changes.          Review of Systems   Constitutional, HEENT, cardiovascular, pulmonary, gi and gu systems are negative, except as otherwise noted.      Objective           Vitals:  No vitals were obtained today due to virtual visit.    Physical Exam   GENERAL: Healthy, alert and no distress  EYES: Eyes grossly  normal to inspection.  No discharge or erythema, or obvious scleral/conjunctival abnormalities.  RESP: No audible wheeze, cough, or visible cyanosis.  No visible retractions or increased work of breathing.  Some sinus tenderness over maxilla.  SKIN: Visible skin clear. No significant rash, abnormal pigmentation or lesions.  NEURO: Cranial nerves grossly intact.  Mentation and speech appropriate for age.  PSYCH: Mentation appears normal, affect normal/bright, judgement and insight intact, normal speech and appearance well-groomed.    Office Visit on 12/12/2019   Component Date Value Ref Range Status     PAP 12/12/2019 NIL   Final     Copath Report 12/12/2019    Final                    Value:  Patient Name: HEAVEN LITTLE  MR#: [id number]  Specimen #: [id number]  Collected: 12/12/2019  Received: 12/13/2019  Reported: 12/16/2019 13:25  Ordering Phy(s): ANNE-MARIE RAMOS    For improved result formatting, select 'View Enhanced Report Format' under   Linked Documents section.    SPECIMEN/STAIN PROCESS:  Pap imaged thin layer prep screening (Surepath, FocalPoint with guided   screening)       Pap-Cyto x 1, HPV ordered x 1    SOURCE: Cervical, endocervical  ----------------------------------------------------------------   Pap imaged thin layer prep screening (Surepath, FocalPoint with guided   screening)  SPECIMEN ADEQUACY:  Satisfactory for evaluation.  -Transformation zone component present.    CYTOLOGIC INTERPRETATION:    Negative for intraepithelial lesion or malignancy    Electronically signed out by:  AVINASH Leigh  (ASCP)    CLINICAL HISTORY:  LMP: 11/28/2019  A previous normal pap  Date of Last Pap: 07/15/2016,    Papanicolaou Test Limitations:  Cervical cyto                          logy is a screening test with   limited sensitivity; regular  screening is critical for cancer prevention; Pap tests are primarily   effective for the diagnosis/prevention of  squamous cell carcinoma, not adenocarcinomas or  other cancers.    COLLECTION SITE:  Client[facility]  Location: [facility] ([facility])    The technical component of this testing was completed at the [facility], with the professional component performed   at the [facility], [address] [place] [address],   [place] [address] ([phone])         HPV Source 12/12/2019 SurePath   Final     HPV 16 DNA 12/12/2019 Negative  NEG^Negative Final     HPV 18 DNA 12/12/2019 Negative  NEG^Negative Final     Other HR HPV 12/12/2019 Negative  NEG^Negative Final     Final Diagnosis 12/12/2019 This patient's sample is negative for HPV DNA.   Final    Comment: This test was developed and its performance characteristics determined by the   [facility]. It   has not been cleared or approved by the FDA. The laboratory is regulated under   CLIA as qualified to perform high-complexity testing. This test is used for   clinical purposes. It should not be regarded as investigational or for   research.  (Note)  METHODOLOGY:  The Roche kavita 4800 system uses automated extraction,   simultaneous amplification of HPV (L1 region) and beta-globin,    followed by  real time detection of fluorescent labeled HPV and beta   globin using specific oligonucleotide probes . The test specifically   identifies types HPV 16 DNA and HPV 18 DNA while concurrently   detecting the rest of the high risk types (31, 33, 35, 39, 45, 51,   52, 56, 58, 59, 66 or 68).  COMMENTS:  This test is not intended for use as a screening device   for women under age 30 with normal cervical cytology.  Results should   be correl                           ated with cytologic and histologic findings. Close clinical   followup is recommended.       Specimen Description 12/12/2019 Cervical Cells   Final    [id number]     No results found for any visits on  01/04/21.            Video-Visit Details    Type of service:  Video Visit    Video End Time:2:27 PM    Originating Location (pt. Location): Home    Distant Location (provider location):  [facility]     Platform used for Video Visit: [employer]Well

## 2021-01-04 NOTE — PATIENT INSTRUCTIONS
Thank you for visiting [facility]    Take the Decadron until its gone.  Let me know if any concerning side effects.    Okay to use Tylenol and/or ibuprofen to help with headache when they occur.  Try to ensure you are getting adequate sleep.  Stay hydrated.    If you develop additional sinus symptoms, let me know.    If continued bothersome headaches next week, let me know.  We will go ahead and start Topamax at that time.  If we do need to start Topamax, please follow-up with me in 1 month to ensure that you are responding well and getting some improvement.    There are some other treatment options we could consider after this if not responding to either Decadron or Topamax.    Contact us or return if questions or concerns.     Have a nice day!    Dr. Davalos     No follow-ups on file.      If you need medication refills, please contact your pharmacy 3 days before your prescriptions runs out or download the [facility] miguel for your smart phone.   If you are out of refills, your pharmacy will contact contact the clinic.                                     [employer] assistance [phone]

## 2021-01-14 ENCOUNTER — TELEPHONE (OUTPATIENT)
Dept: ENDOCRINOLOGY | Facility: CLINIC | Age: 36
End: 2021-01-14

## 2021-01-14 NOTE — TELEPHONE ENCOUNTER
Left message to call back.  Please ask what labs she is wanting.    Betzaida Vizcarra, CARLA 1/14/2021 2:42 PM

## 2021-01-15 ENCOUNTER — HEALTH MAINTENANCE LETTER (OUTPATIENT)
Age: 36
End: 2021-01-15

## 2021-01-15 NOTE — TELEPHONE ENCOUNTER
"Spoke with patient - she said \"any labs that she thinks I should have done.\" Patient states that \"Dr. Duncan always checks my thyroid and if there are any issues, endocrinology will do further follow up.\"    Pended potential labs. Please review/sign if appropriate. Informed patient that MM out until Monday and she's okay to wait.  Luna Donovan, [facility]    "

## 2021-01-18 NOTE — TELEPHONE ENCOUNTER
I ordered her thyroid test in 2018, but had to route it to her Endocrinologist.   Her Endocrinologist has been ordering her labs since.     She is not due for any other blood work at this time.  Sometimes insurance companies will give rebates if you have lipid screening, so if she wants that done, she should come in fasting.      We can discuss what labs she wants at her appointment and then she can stop by lab on her way out.  We do not have to have her stop by lab before.

## 2021-02-22 ENCOUNTER — OFFICE VISIT (OUTPATIENT)
Dept: OBGYN | Facility: OTHER | Age: 36
End: 2021-02-22
Payer: COMMERCIAL

## 2021-02-22 VITALS
BODY MASS INDEX: 34.66 KG/M2 | WEIGHT: 208 LBS | HEIGHT: 65 IN | DIASTOLIC BLOOD PRESSURE: 76 MMHG | SYSTOLIC BLOOD PRESSURE: 120 MMHG

## 2021-02-22 DIAGNOSIS — Z00.00 ANNUAL PHYSICAL EXAM: Primary | ICD-10-CM

## 2021-02-22 DIAGNOSIS — N92.1 MENOMETRORRHAGIA: ICD-10-CM

## 2021-02-22 DIAGNOSIS — E06.3 HASHIMOTO'S THYROIDITIS: ICD-10-CM

## 2021-02-22 LAB — TSH SERPL DL<=0.005 MIU/L-ACNC: 2.71 MU/L (ref 0.4–4)

## 2021-02-22 PROCEDURE — 84443 ASSAY THYROID STIM HORMONE: CPT | Performed by: OBSTETRICS & GYNECOLOGY

## 2021-02-22 PROCEDURE — 36415 COLL VENOUS BLD VENIPUNCTURE: CPT | Performed by: OBSTETRICS & GYNECOLOGY

## 2021-02-22 PROCEDURE — 99395 PREV VISIT EST AGE 18-39: CPT | Performed by: OBSTETRICS & GYNECOLOGY

## 2021-02-22 ASSESSMENT — MIFFLIN-ST. JEOR: SCORE: 1644.36

## 2021-02-22 NOTE — PROGRESS NOTES
Chief Complaint   Patient presents with     Physical       Subjective  Lucero Ku is a 35 year old female who presents for her annual exam.  Patient has PCOS.  She was placed on oral contractive pills due to amenorrhea however since being on the pill she has had very irregular menses and very heavy as well.  She will try to stop for a month and then start again however she will then bleed for the entire month.  She is unsure where to go from here because she fells the oral contractive pills are not working anymore.  We discussed getting a pelvic ultrasound and due to her history of PCOS, I recommended an endometrial biopsy as well.  She will schedule those.  Then we can discuss treatment options.  2 c sections, last with TL.  2 sets of twins.   No problems urinating.  Normal bowel movements.  Patient is sexually active.  No dyspareunia.  Occasional vaginal spotting after.        Most recent pap: 2019  History of abnormal Pap smear:  No  History of STI's:  No  History of PID:  No    Family history of uterine cancer:  No  Family history of ovarian cancer: No  Family history of colon cancer:  No  Family history of breast cancer:  No    ROS  ROS: 10 point ROS neg other than the symptoms noted above in the HPI.    Past Medical History:   Diagnosis Date     Hashimoto's thyroiditis      Infertility, female     in vitro, tiwn birth     PCOS (polycystic ovarian syndrome)      Past Surgical History:   Procedure Laterality Date      SECTION  3/4/2014    Procedure:  SECTION;;  Surgeon: Paige Ly MD;  Location: [facility] L+[facility]      SECTION, TUBAL LIGATION, COMBINED  2017    [facility]. left partial salpingectomy;  right salpingectomy including fimbria      ELBOW SURGERY      Right elbow     HC TOOTH EXTRACTION W/FORCEP       HYSTEROSCOPY DIAGNOSTIC  2011     Family History   Problem Relation Age of Onset     Hypertension Mother      Hypertension Father      Cancer Father 55         "Stage IV Kidney (9/13)     Thyroid Disease Father         hypothyroidism     Diabetes Maternal Grandfather      Hypertension Maternal Grandfather      Lipids Maternal Grandfather         Defibrilator     Cancer Paternal Grandmother      Thyroid Disease Paternal Grandmother      Hypertension Paternal Grandmother      Cancer Paternal Grandfather      Thyroid Disease Paternal Grandfather      Hypertension Paternal Grandfather      Hypertension Sister      Sleep Apnea Sister      Anxiety Disorder Sister      Hypertension Brother      Anxiety Disorder Brother      Social History     Tobacco Use     Smoking status: Never Smoker     Smokeless tobacco: Never Used     Tobacco comment: No smokers in home.   Substance Use Topics     Alcohol use: Yes     Comment: social       Tobacco abuse:  No  Do you get at least three servings of calcium containing foods daily (dairy, green leafy vegetables, etc.)? yes   Outside of work or daily activities, how many days per week do you exercise for 30 minutes or longer? 3-4x per week  The patient does not drink >3 drinks per day nor >7 drinks per week.   Have you had an eye exam in the past two years? yes   Do you see a dentist twice per year? yes   Today's PHQ-2 Score:   Abuse: Current or Past(Physical, Sexual or Emotional)- No   Do you feel safe in your environment - Yes  Objective  Vitals: /76 (BP Location: Right arm, Cuff Size: Adult Regular)   Ht 1.651 m (5' 5\")   Wt 94.3 kg (208 lb)   BMI 34.61 kg/m    BMI= Body mass index is 34.61 kg/m .    General appearance=well developed, well-nourished female  Gait=normal  Psych=mood is stable, alert and oriented x3  HEENT=mucous membranes moist  Skin=no rashes or lesions seen,normal turgor   Breast:  Benign exam, no masses palpated.  No skin changes, no axillary lymphadenopathy, no nipple discharge.  Axilla feel completely normal, no lymph node enlargement and non-tender.  Neck=overall appearance is normal  Heart=RRR, no murmurs, no " swelling noted  Thyroid=normal, no masses, no TTP, no enlargement  Lungs=non-labored breathing, no use of accessory muscles, clear to ausculation bilaterally  Abd=soft, Nontender/nondistended, +bowel sounds x4, no masses, no signs of hernias, no evidence of hepatosplenomegaly  PELVIC:    External genitalia: normal without lesions or masses  Urethral meatus: no lesions or prolapse noted, normal size  Urethra: no masses, non tender  Bladder: non tender, no fullness  Vagina: normal mucosa and rugae, no discharge.  Cervix: normal without lesion, no cervical motion tenderness, healthy, nulliparous  Uterus: small, mobile, nontender.  Adnexa: non tender, without masses  Rectal: deferred  Ext=no clubbing or cyanosis, no swelling      Last lipid profile: 2018  Regular self breast exam:  No  Most recent mammogram:  N/A  History of abnormal mammogram: N/A  Fit testing:  N/A  DEXA:  N/A        Assessment/Plan  1.)  Annual/well woman  2.)  Menometrorrhagia=schedule pelvic ultrasound and endometrial biopsy  3.)  PCOS=consider Mirena or Provera  4.)  History of Hashimoto's=pt states only after breastfeeding.  No medications for this.  Will check a TSH today      The following topics were discussed or recommended   Discussed seat belt, helmet and sunscreen use  Vision screening   Calcium/Vitamin D supplement=Recommended 1000 mg of calcium daily and 800 IU of vitamin D.    25 minutes were spent on the date of the encounter doing chart review, history and exam, documentation, and further activities as noted above.    Total Visit Time: 30 minutes        Chelo Mart DO

## 2021-02-22 NOTE — PATIENT INSTRUCTIONS
PREVENTIVE HEALTH RECOMMENDATIONS:   Get a physical every year.  A pap test is important to have done starting at age 21 and then every three years as long as your pap is normal.  When you receive the results of your pap test it will include when you need to have your next pap test.    You should be tested each year for chlamydia and gonorrhea if you are aged 16-25 and if you have had a new sexual partner since you were last tested.   Vaccines: Get a flu shot each year.   Eat at least 8-10 servings of fruits and vegetables daily.  Eat whole-grain bread and cereal, whole-wheat pasta and brown rice instead of white grains and white rice.   For bone health: Eat calcium-rich foods (dairy products) or take calcium pills (500 to 600 mg with vitamin D) twice a day with food.   Exercise for an average of 30 minutes a day, 5 days of the week. It can be as simple as taking a brisk walk.  This will help you control your weight and prevent many diseases.   Limit alcohol to one drink per day.   Don't smoke and limit your exposure to second hand smoke.  If you smoke consider making a plan to quit. Go to [employer] and clink on   "[employer]  for help   Wear sunscreen with at least SPF15 to prevent skin damage and skin cancer.   Brush your teeth twice a day and floss once a day and see your dentist twice a year for an exam and cleaning.   Have a great year and I will look forward to seeing you next year.   Chelo Mart, DO

## 2021-02-26 ENCOUNTER — ANCILLARY PROCEDURE (OUTPATIENT)
Dept: ULTRASOUND IMAGING | Facility: OTHER | Age: 36
End: 2021-02-26
Attending: OBSTETRICS & GYNECOLOGY
Payer: COMMERCIAL

## 2021-02-26 DIAGNOSIS — N92.1 MENOMETRORRHAGIA: ICD-10-CM

## 2021-03-05 ENCOUNTER — OFFICE VISIT (OUTPATIENT)
Dept: OBGYN | Facility: OTHER | Age: 36
End: 2021-03-05
Payer: COMMERCIAL

## 2021-03-05 VITALS — SYSTOLIC BLOOD PRESSURE: 120 MMHG | DIASTOLIC BLOOD PRESSURE: 82 MMHG | WEIGHT: 211 LBS | BODY MASS INDEX: 35.11 KG/M2

## 2021-03-05 DIAGNOSIS — N92.1 MENOMETRORRHAGIA: ICD-10-CM

## 2021-03-05 DIAGNOSIS — E28.2 PCOS (POLYCYSTIC OVARIAN SYNDROME): Primary | ICD-10-CM

## 2021-03-05 PROCEDURE — 58100 BIOPSY OF UTERUS LINING: CPT | Performed by: OBSTETRICS & GYNECOLOGY

## 2021-03-05 PROCEDURE — 99213 OFFICE O/P EST LOW 20 MIN: CPT | Mod: 25 | Performed by: OBSTETRICS & GYNECOLOGY

## 2021-03-05 PROCEDURE — 88305 TISSUE EXAM BY PATHOLOGIST: CPT | Performed by: PATHOLOGY

## 2021-03-05 NOTE — PATIENT INSTRUCTIONS
Endometrial Biopsy  Endometrial biopsy is a procedure used to study the endometrium (lining of the uterus). It is usually done in your healthcare provider s office. During the biopsy, small tissue samples are taken from the uterine lining. These are then sent to a lab for study. If any problems are found, you and your healthcare provider will discuss treatment options. The biopsy usually takes less than 20 minutes, and you can often go back to your normal routine as soon as the procedure is over.   Reasons for the Procedure  Endometrial biopsy may help pinpoint the cause of certain problems. These include:    Bleeding after menopause    Heavy or irregular periods    Bleeding associated with hormone replacement therapy    Prolonged bleeding    Abnormal Pap test results    Trouble getting pregnant (fertility problems)    What Are the Risks?  Problems with endometrial biopsy are rare, but can include:    Bleeding    Infection    Damage to the uterine wall (very rare)  Getting Ready for the Procedure  Your doctor will ask about your health and any medications you take, such as blood thinners. Before your biopsy, you may have tests to make sure you re not pregnant or have an infection. You may also be asked to sign a consent form. A day or two before the procedure:     Avoid using creams or other vaginal medications.    Avoid douching.    Ask your healthcare provider if you should take pain medications shortly before the test.   During the Biopsy    You will be asked to lie on an exam table with your knees bent, just as you do for a Pap test.    You may have a brief pelvic exam. An instrument called a speculum is then inserted into the vagina to hold it open.    An antiseptic solution is applied to the cervix. The cervix may also be numbed with an anesthetic or dilated to widen the opening.    A small suction tube is passed through the cervix into the uterus.    It is normal to feel some cramping when the tube is  inserted. But tell your healthcare provider if you have severe cramping or are very uncomfortable.    Using mild suction, samples are taken from the uterine lining. You may feel pinching or additional cramping when this is done.    The tube and speculum are then removed and the samples sent to a lab for study.  After the Procedure    If you feel lightheaded or dizzy, you can rest on the table until you re ready to get dressed.    For a few hours, you may feel some mild cramping. This can usually be relieved with over-the-counter pain medications.    You may have some bleeding for a few days. Use pads instead of tampons.    Don t douche or use any vaginal medications unless your healthcare provider says it s okay.    Ask your healthcare provider when it s okay to have sex again.  Follow-Up  It will take about a week for the biopsy results to come back from the lab. Then you and your healthcare provider can discuss the results. These may show that no treatment is required. Or, you may be scheduled for a follow-up appointment and further tests. If your biopsy was done for fertility problems, be sure to record the day when your next period begins.     Call your healthcare provider if you have:    Heavy bleeding (more than a pad an hour for 2 hours).    Severe cramping, or increasing pain.    Fever over 101 F.    Foul-smelling or unusual vaginal discharge.     What you may expect after an endometrial biopsy:  Mild cramping for less than 48 hours is to be expected, if you have can take ibuprofen or Motrin you may use this for the cramping.   A small amount of bleeding would be considered normal as well.    You may resume your normal activities including sexual intercourse as soon as you feel ready.       WARNING SIGNS:  If you are experiencing:  Fever  Foul smelling vaginal discharge  Cramping lasting longer than 48 hours  Severe cramping  Bleeding heavier than a period  CALL THE CLINIC IMMEDIATELY    You will be  contacted with the results of your biopsy in about one week.   A follow up plan will be made with you when your results are available.     Chelo Mart, DO

## 2021-03-05 NOTE — PROGRESS NOTES
Subjective  35 year old non-pregnant female presents today for an endometrial biopsy.  I saw patient last month for her annual exam when she complained of menometrorrhagia.  Patient has PCOS.  She was placed on oral contractive pills due to amenorrhea however since being on the pill she has had very irregular menses and very heavy as well.  She will try to stop for a month and then start again however she will then bleed for the entire month.  She is unsure where to go from here because she fells the oral contractive pills are not working anymore.  Due to her history of PCOS, I recommended the endometrial biopsy.  2 c sections, last with TL.  2 sets of twins.   No problems urinating.  Normal bowel movements.  Patient is sexually active.  No dyspareunia.  Occasional vaginal spotting after.   We discussed her ultrasound results in detail.        I personally reviewed the ultrasound and the findings were normal-no fibroids or masses however slightly larger uterus.     ROS: 10 point ROS neg other than the symptoms noted above in the HPI.  Past Medical History:   Diagnosis Date     Hashimoto's thyroiditis      Infertility, female     in vitro, tiwn birth     PCOS (polycystic ovarian syndrome)      Past Surgical History:   Procedure Laterality Date      SECTION  3/4/2014    Procedure:  SECTION;;  Surgeon: Paige Ly MD;  Location: [facility]      SECTION, TUBAL LIGATION, COMBINED  2017    [facility]. left partial salpingectomy;  right salpingectomy including fimbria      ELBOW SURGERY      Right elbow     HC TOOTH EXTRACTION W/FORCEP       HYSTEROSCOPY DIAGNOSTIC  2011     Family History   Problem Relation Age of Onset     Hypertension Mother      Hypertension Father      Cancer Father 55        Stage IV Kidney ()     Thyroid Disease Father         hypothyroidism     Diabetes Maternal Grandfather      Hypertension Maternal Grandfather      Lipids Maternal Grandfather          Defibrilator     Cancer Paternal Grandmother      Thyroid Disease Paternal Grandmother      Hypertension Paternal Grandmother      Cancer Paternal Grandfather      Thyroid Disease Paternal Grandfather      Hypertension Paternal Grandfather      Hypertension Sister      Sleep Apnea Sister      Anxiety Disorder Sister      Hypertension Brother      Anxiety Disorder Brother      Social History     Tobacco Use     Smoking status: Never Smoker     Smokeless tobacco: Never Used     Tobacco comment: No smokers in home.   Substance Use Topics     Alcohol use: Yes     Comment: social         Objective  Vitals: /82 (BP Location: Left arm, Cuff Size: Adult Regular)   Wt 95.7 kg (211 lb)   BMI 35.11 kg/m    BMI= Body mass index is 35.11 kg/m .    General appearance=well developed, well-nourished female  Gait=normal  Psych=mood is stable, alert and oriented x3  Abd=soft, Nontender/nondistended, no masses, no signs of hernias, no evidence of hepatosplenomegaly  PELVIC:    External genitalia: normal without lesions or masses  Urethral meatus: no lesions or prolapse noted, normal size  Urethra: no masses, non tender  Bladder: non tender, no fullness  Vagina: normal mucosa and rugae, no discharge.  Cervix: normal without lesion, no cervical motion tenderness, healthy, nulliparous  Uterus: small, mobile, nontender.  Adnexa: non tender, without masses  Rectal: deffered  Ext=no clubbing or cyanosis, no swelling      Pelvic ultrasound=2/26/2021:  FINDINGS:     UTERUS: 12.8 x 3.4 x 5.3 cm. Normal in size and position with no  masses.     ENDOMETRIUM: 4 mm. Normal smooth endometrium.     RIGHT OVARY: 4.1 x 2.9 x 3.2 cm. Normal with flow demonstrated.     LEFT OVARY: 3.6 x 3.4 x 3.2 cm. Normal with flow demonstrated.     No significant free fluid.                                                                      IMPRESSION:  1.  Normal pelvic ultrasound      Procedure:  Endometrial biopsy    Indication: PCOS                       Discussed risk of bleeding, infection, uterine perforation, cramping pain.  Pt agreed to proceed with procedure after all questions answered.    Speculum placed and cervix visualized.  Cervix cleansed with betadine x 3.  Tenaculum placed on anterior lip of the cervix.  Endometrial biopsy pipelle passed through cervix and uterus sounded to 7 cm.  Biopsy specimen collected with one pass with return of moderate amount of pink tissue.  Specimen placed in a labeled container and set aside to be sent to pathology.  Tenaculum removed from the cervix and sites hemostatic after application of Silver Nitrate.  No bleeding noted from cervical os.     Patient tolerated the procedure well.  There were no apparent complications and bleeding was minimal.    She is instructed to use no tampons and have no intercourse for the next 5 days.      Assessment  1.)  PCOS  2.)  Menometrorrhagia      Plan  1.)  Endometrial biopsy today  2.)  Follow-up to discuss treatment options    15 minutes were spent on the date of the encounter doing chart review, history and exam, documentation, and further activities as noted above.    Total Visit Time: 20 minutes    Nursing notes read and reviewed    Chelo Mart DO

## 2021-03-09 LAB — COPATH REPORT: NORMAL

## 2021-05-14 ENCOUNTER — IMMUNIZATION (OUTPATIENT)
Dept: PEDIATRICS | Facility: CLINIC | Age: 36
End: 2021-05-14
Payer: COMMERCIAL

## 2021-05-14 PROCEDURE — 91300 PR COVID VAC PFIZER DIL RECON 30 MCG/0.3 ML IM: CPT

## 2021-05-14 PROCEDURE — 0001A PR COVID VAC PFIZER DIL RECON 30 MCG/0.3 ML IM: CPT

## 2021-06-27 ENCOUNTER — E-VISIT (OUTPATIENT)
Dept: URGENT CARE | Facility: URGENT CARE | Age: 36
End: 2021-06-27
Payer: COMMERCIAL

## 2021-06-27 DIAGNOSIS — L23.7 ALLERGIC CONTACT DERMATITIS DUE TO PLANTS, EXCEPT FOOD: Primary | ICD-10-CM

## 2021-06-27 PROCEDURE — 99421 OL DIG E/M SVC 5-10 MIN: CPT | Performed by: PHYSICIAN ASSISTANT

## 2021-06-27 RX ORDER — PREDNISONE 20 MG/1
40 TABLET ORAL DAILY
Qty: 10 TABLET | Refills: 0 | Status: SHIPPED | OUTPATIENT
Start: 2021-06-27 | End: 2021-07-02

## 2021-06-27 RX ORDER — TRIAMCINOLONE ACETONIDE 1 MG/G
OINTMENT TOPICAL 2 TIMES DAILY
Qty: 80 G | Refills: 1 | Status: SHIPPED | OUTPATIENT
Start: 2021-06-27 | End: 2021-11-10

## 2021-06-27 NOTE — PATIENT INSTRUCTIONS
Dear Lucero Ku    After reviewing your responses, I've been able to diagnose you with contact dermatitis due to poison ivy/poison oak, which is a common skin condition that causes small fluid-filled blisters or bumps to appear on your skin.    Based on your responses, I have prescribed an oral steroid and topical steroid ointment to treat this. You may take oral benadryl as needed for itching. Please follow the instructions on the medication. If you experience irritation of your skin, new rash, or any other new symptoms, you should stop using this medication and contact your primary care provider.     If this treatment does not work for you or you will run out of refills, please plan to follow- up with your primary care provider to set refills for a longer period of time or to try other options.     Things you can do to help prevent this:     Do not scratch your rash.Bacteria from your fingernails may enter your open sores during scratching and cause an infection.     Use moisturizers or emollients, such as petroleum jelly. These help relieve itching and help prevent bacteria from getting in your sores.      Thanks for choosing us as your health care partner,    Dahiana San PA-C

## 2021-09-26 ENCOUNTER — HEALTH MAINTENANCE LETTER (OUTPATIENT)
Age: 36
End: 2021-09-26

## 2021-10-15 ENCOUNTER — OFFICE VISIT (OUTPATIENT)
Dept: OBGYN | Facility: OTHER | Age: 36
End: 2021-10-15
Payer: COMMERCIAL

## 2021-10-15 VITALS — SYSTOLIC BLOOD PRESSURE: 120 MMHG | WEIGHT: 203 LBS | BODY MASS INDEX: 33.78 KG/M2 | DIASTOLIC BLOOD PRESSURE: 76 MMHG

## 2021-10-15 DIAGNOSIS — N92.1 MENOMETRORRHAGIA: Primary | ICD-10-CM

## 2021-10-15 DIAGNOSIS — E28.2 PCOS (POLYCYSTIC OVARIAN SYNDROME): ICD-10-CM

## 2021-10-15 PROCEDURE — 99214 OFFICE O/P EST MOD 30 MIN: CPT | Performed by: OBSTETRICS & GYNECOLOGY

## 2021-10-15 RX ORDER — NORGESTIMATE AND ETHINYL ESTRADIOL 7DAYSX3 LO
1 KIT ORAL DAILY
Qty: 84 TABLET | Refills: 1 | Status: SHIPPED | OUTPATIENT
Start: 2021-10-15 | End: 2022-02-25

## 2021-10-15 NOTE — PROGRESS NOTES
Subjective  36 year old non-pregnant female presents today for a follow up due to her menometrorrhagia.  I saw patient in March for her annual exam when she complained of menometrorrhagia.  Patient has PCOS. She had an endometrial biopsy in March as well which was benign. She was placed on oral contractive pills due to amenorrhea however since being on the pill she has had very irregular menses and very heavy as well.    She will try to stop for a month and then start again however she will then bleed for the entire month.  She is unsure where to go from here because she fells the oral contractive pills are not working anymore.  She admits to more acne since the birth of her twin daughters.  She is hoping for something to help with her acne.  2 c sections, last with TL.  2 sets of twins.   No problems urinating.  Normal bowel movements.  Patient is sexually active.  No dyspareunia.  Occasional vaginal spotting after.   We discussed her ultrasound results in detail.  We discussed the need to have a menses with PCOS occasionally.  She is wanting to try another oral contractive pills and hopefully one that will help her acne.        I personally reviewed the ultrasound and the findings were benign.      ROS: 10 point ROS neg other than the symptoms noted above in the HPI.  Past Medical History:   Diagnosis Date     Hashimoto's thyroiditis      Infertility, female     in vitro, tiwn birth     PCOS (polycystic ovarian syndrome)      Past Surgical History:   Procedure Laterality Date      SECTION  3/4/2014    Procedure:  SECTION;;  Surgeon: Paige Ly MD;  [facility]      SECTION, TUBAL LIGATION, COMBINED  2017    [facility]. left partial salpingectomy;  right salpingectomy including fimbria      ELBOW SURGERY      Right elbow     HC TOOTH EXTRACTION W/FORCEP       HYSTEROSCOPY DIAGNOSTIC  2011     Family History   Problem Relation Age of Onset     Hypertension Mother       Hypertension Father      Cancer Father 55        Stage IV Kidney (9/13)     Thyroid Disease Father         hypothyroidism     Diabetes Maternal Grandfather      Hypertension Maternal Grandfather      Lipids Maternal Grandfather         Defibrilator     Cancer Paternal Grandmother      Thyroid Disease Paternal Grandmother      Hypertension Paternal Grandmother      Cancer Paternal Grandfather      Thyroid Disease Paternal Grandfather      Hypertension Paternal Grandfather      Hypertension Sister      Sleep Apnea Sister      Anxiety Disorder Sister      Hypertension Brother      Anxiety Disorder Brother      Social History     Tobacco Use     Smoking status: Never Smoker     Smokeless tobacco: Never Used     Tobacco comment: No smokers in home.   Substance Use Topics     Alcohol use: Yes     Comment: social         Objective  Vitals: /76 (BP Location: Left arm, Cuff Size: Adult Regular)   Wt 92.1 kg (203 lb)   LMP  (LMP Unknown)   Breastfeeding No   BMI 33.78 kg/m    BMI= Body mass index is 33.78 kg/m .    General appearance=well developed, well-nourished female  Gait=normal  Psych=mood is stable, alert and oriented x3      Assessment  1.)  Menometrorrhagia  2.)  PCOS  3.)  Acne      Plan  1.)  Oral contractive pills ordered  2.)  Follow-up in 3 months      25 minutes were spent on the date of the encounter doing chart review, history and exam, documentation, and further activities as noted above.    Nursing notes read and reviewed    Chelo Mart DO

## 2021-10-20 ENCOUNTER — E-VISIT (OUTPATIENT)
Dept: URGENT CARE | Facility: CLINIC | Age: 36
End: 2021-10-20
Payer: COMMERCIAL

## 2021-10-20 DIAGNOSIS — L50.9 URTICARIA: Primary | ICD-10-CM

## 2021-10-20 PROCEDURE — 99421 OL DIG E/M SVC 5-10 MIN: CPT | Performed by: PHYSICIAN ASSISTANT

## 2021-10-21 NOTE — PATIENT INSTRUCTIONS
Dear Lucero Ku    It looks like you have hives.  Try an over the counter antihistamine like Zyrtec or Allegra daily and see if this helps.    Thanks for choosing us as your health care partner,    Selena Rubin, PA-C, PA-C

## 2021-10-25 ENCOUNTER — NURSE TRIAGE (OUTPATIENT)
Dept: NURSING | Facility: CLINIC | Age: 36
End: 2021-10-25

## 2021-10-25 NOTE — TELEPHONE ENCOUNTER
Patient calling reporting an itchy red rash to the backside of the legs, thighs and lower back. Reports having an E-visit with a provider who advised to try over the counter medication that has not been helpful. Reports the itching is severe. Patient questioning if while on a hike if she sat on poison ivy as both her and her spouse have this rash. Denies fever, sunburn like rash and weakness. Advised per protocol to be seen in urgent care as there are no office visits today.     Surekha August RN 10/25/21 2:05 PM   [facility] Triage Nurse Advisor          Reason for Disposition    SEVERE itching    Additional Information    Negative: Sudden onset of rash (within last 2 hours) and difficulty with breathing or swallowing    Negative: Difficult to awaken or acting confused (e.g., disoriented, slurred speech)    Negative: Too weak or sick to stand    Negative: Life-threatening reaction (anaphylaxis) in the past to similar substance (e.g., food, insect bite/sting, chemical, etc.) and < 2 hours since exposure    Negative: Sounds like a life-threatening emergency to the triager    Negative: Fever and purple or blood-colored spots or dots    Negative: Insect bites suspected    Negative: Sunburn suspected    Negative: Hives suspected    Negative: Drug rash suspected and started taking new medicine within last 2 weeks (Exception: antihistamine, eye drops, ear drops, decongestant or other OTC cough/cold medicines)    Negative: Bright red, sunburn-like rash and current tampon use    Negative: Bright red, sunburn-like rash and current tampon use or nasal packing    Negative: Bright red, sunburn-like rash and wound infection or recent surgery    Negative: Bright red skin that peels off in sheets    Negative: Stiff neck (can't touch chin to chest)    Negative: Patient sounds very sick or weak to the triager    Negative: Fever    Negative: Face becomes swollen    Negative: Headache    Negative: Purple or blood-colored spots or dots  (no fever and sounds well to triager)    Negative: Joint pain or swelling    Negative: Sores in mouth    Negative: Rash looks like large or small blisters (i.e., fluid filled bubbles or sacs on the skin)    Negative: Pregnant    Negative: Rash began within 4 hours of a new prescription medication    Protocols used: RASH OR REDNESS - WIDESPREAD-A-OH

## 2021-11-08 NOTE — PROGRESS NOTES
Assessment & Plan       ICD-10-CM    1. Rash and nonspecific skin eruption  R21 Lyme Disease Amanda with reflex to WB Serum     Adult Dermatology Referral     permethrin (ELIMITE) 5 % external cream     ESR: Erythrocyte sedimentation rate     CRP, inflammation     Comprehensive metabolic panel (BMP + Alb, Alk Phos, ALT, AST, Total. Bili, TP)     Comprehensive metabolic panel (BMP + Alb, Alk Phos, ALT, AST, Total. Bili, TP)     CRP, inflammation     ESR: Erythrocyte sedimentation rate     Lyme Disease Amanda with reflex to WB Serum   2. Itching  L29.9 Lyme Disease Amanda with reflex to WB Serum     Adult Dermatology Referral     ESR: Erythrocyte sedimentation rate     CRP, inflammation     Comprehensive metabolic panel (BMP + Alb, Alk Phos, ALT, AST, Total. Bili, TP)     Comprehensive metabolic panel (BMP + Alb, Alk Phos, ALT, AST, Total. Bili, TP)     CRP, inflammation     ESR: Erythrocyte sedimentation rate     Lyme Disease Amanda with reflex to WB Serum   3. Insect bite of thigh, unspecified laterality, initial encounter  S70.369A Lyme Disease Amanda with reflex to WB Serum    W57.XXXA permethrin (ELIMITE) 5 % external cream     Lyme Disease Amanda with reflex to WB Serum   4. Allergic contact dermatitis due to plants, except food  L23.7 triamcinolone (KENALOG) 0.1 % external ointment      Exact etiology of her symptoms is somewhat unclear.  I am skeptical that this is an inflammatory process given the minimal response to oral steroids.  Clinically, this does not appear very consistent with a fungal process.  I am somewhat more suspicious of some sort of mite, though this does not appear totally consistent with scabies.  Discussed options for further treatment.  After discussion, we will go ahead and obtain some labs to look for underlying systemic issues or Lyme disease.  Given the pattern that is somewhat consistent with a mite, we will do a trial of permethrin cream.  Recommended symptom control using antihistamines and  "topical triamcinolone cream.  Finally, will refer to dermatology.  She requests referral to the dermatologist that she sees routinely.  Follow-up as needed.    Portions of this note were completed using Dragon dictation software.  Although reviewed, there may be typographical and other inadvertent errors that remain.       [facility] reviewed  Prescription drug management  25 minutes spent on the date of the encounter doing chart review, history and exam, documentation and further activities per the note       BMI:   Estimated body mass index is 33.78 kg/m  as calculated from the following:    Height as of 2/22/21: 1.651 m (5' 5\").    Weight as of this encounter: 92.1 kg (203 lb).   Weight management plan: Discussed healthy diet and exercise guidelines    Patient Instructions   Thank you for visiting [facility]    Take an antihistamine (Claritin, Zyrtec, Allegra, Benadryl or similar generic - loratadine, cetirizine, fexofenadine, diphenhydramine) to help with your symptoms.     Can also use triamcinolone to help with itching and dryness.    We'll let you know your lab results as soon as we can.     Let's also try permethrin cream in case this is some sort of mite that is causing your symptoms.    Finally, I will put in a referral to your dermatologist to take a look.    Contact us or return if questions or concerns.     Have a nice day!    Dr. Davalos     No follow-ups on file.      If you need medication refills, please contact your pharmacy 3 days before your prescriptions runs out or download the [facility] miguel for your smart phone. If you are out of refills, your pharmacy will contact contact the clinic.                                     MyChart Assistance [phone]                       Return if symptoms worsen or fail to improve.    Elmer Davalos MD, MD  [facility] ELK " FRIDA Barker is a 36 year old who presents for the following health issues     History of Present Illness       She eats 4 or more servings of fruits and vegetables daily.She consumes 0 sweetened beverage(s) daily.She exercises with enough effort to increase her heart rate 20 to 29 minutes per day.  She exercises with enough effort to increase her heart rate 5 days per week.   She is taking medications regularly.       FREEMAN wesley coming in to follow up on what she was told is poison ivy and not better after steroid.    Pt has had a rash for almost a month.  Has been told lots of different things about.  Has been on oral prednisone taper for about 2 weeks.    Has been on antibiotics and new birth control recently, but rash predates these and didn't clearly worsen with either.      Rash has been gradually getting worse.  Getting itching all over.      Taking benadryl at night.  Not taking hydroxyzine.  Done with prednisone as of today.  Did try zyrtec initially.  Also tried triamcinolone cream without improvement.      Denies joint pains, or fevers.    Rash will start as small red papule, then circular.  2 lesions are now linear.  Symptoms seemed to start on her leg and then have spread superiorly.  Get dry and flaky as they resolve.      From   Consuelo Meehan PA-C - 10/25/2021 6:20 PM CDT  Formatting of this note is different from the original.  SUBJECTIVE:   Lucero Ku is a 36 year old female that presents for rash to posterior aspect of bilateral lower extremities and the back. Patient states that the rash started around October 15 or 16 towards the end of a trip. States that she was staying in hotel room so was unsure if she could have been exposed to something there. However, does recall going to the bathroom outside while hiking and thinks she may have come into contact with a questionable plant. States that the rash is extremely itchy and has tried over-the-counter antihistamines,  calamine lotion and another over-the-counter cream that she is used in the past with poison ivy with minimal relief of symptoms. States that the rash continues to spread. Also states that now her  seems to have an itchy spot on his right arm. She denies any recent changes in medications soaps or detergents-did start a new birth control earlier this week but the rash started prior to that.  Pt denies any fevers, chills, nausea, vomiting, diarrhea, diaphoresis, myalgias, bleeding, drainage, associated pain, lip/tongue swelling, recent travel, wheezing, dyspnea, dizziness, changes in medications/soaps, or warmth.        Review of Systems   Constitutional, HEENT, cardiovascular, pulmonary, gi and gu systems are negative, except as otherwise noted.      Objective    /70   Pulse 84   Temp 98.7  F (37.1  C) (Temporal)   Resp 16   Wt 92.1 kg (203 lb)   LMP  (LMP Unknown)   SpO2 98%   BMI 33.78 kg/m    Body mass index is 33.78 kg/m .  Physical Exam   GENERAL: healthy, alert and no distress  SKIN: Several patches of erythema and dry skin.  These do not appear consistent with typical eczema.  The 2 largest patches are linear, on her lower leg and upper thigh buttock area.    Office Visit on 03/05/2021   Component Date Value Ref Range Status     Copath Report 03/05/2021    Final                    Value:Patient Name: HEAVEN LITTLE  MR#: [id number]  Specimen #: [id number]  Collected: 3/5/2021  Received: 3/8/2021  Reported: 3/9/2021 18:27  Ordering Phy(s): MADELIN CARDENAS    For improved result formatting, select 'View Enhanced Report Format' under   Linked Documents section.    SPECIMEN(S):  Endometrial biopsy    FINAL DIAGNOSIS:  Endometrial biopsy:  - Fragments of benign proliferative endometrial glands without atypia.    COMMENT:  Interpretation of the biopsy is limited by the fragmented nature of the   glands.  In the areas with preserved  stroma and gland architecture, no evidence of hyperplasia  "is seen.    Electronically signed out by:    ARSLAN Cuadra M.D.    CLINICAL HISTORY:  35-year-old female with polycystic ovary syndrome in mental menorrhagia.    GROSS:  The specimen is received in formalin with proper patient identification   labeled \"endometrial biopsy\".  The  specimen consists of pink spongy tissue fragments and hemorrhagic material   measuring up to 0.8 cm in                            aggregate. The specimen is filtered over lens paper.  The specimen is   entirely submitted in one cassette.  (Dictated by: George Jade 3/8/2021 12:24 PM)    MICROSCOPIC:  The sections show tubular to moderately tortuous glands lined by   pseudostratified mitotically active columnar  epithelium without cytologic atypia.  The majority of the specimen is   fragmented with glands and blood with  rare cohesive tissue showing no architectural abnormality.  Within the   stroma fragments, blood is present  without appreciable acute inflammation, fibrin thrombi, or eosinophilic   change. (Dictated by: MANUEL Cuadra MD 03/09/2021)    The technical component of this testing was completed at the [facility], with the professional component performed   at the [facility], [address] [place] [address]  [phone] ([phone])    CPT Codes:  A: 60644-LU5    COLLECTION SITE:  [facility]  Location: [facility] (P)         Results for orders placed or performed in visit on 11/10/21   Comprehensive metabolic panel (BMP + Alb, Alk Phos, ALT, AST, Total. Bili, TP)     Status: Abnormal   Result Value Ref Range    Sodium 138 133 - 144 mmol/L    Potassium 3.9 3.4 - 5.3 mmol/L    Chloride 107 94 - 109 mmol/L    Carbon Dioxide (CO2) 27 20 - 32 mmol/L    Anion Gap 4 3 - 14 mmol/L    Urea Nitrogen 10 7 - 30 mg/dL    Creatinine 0.68 0.52 - 1.04 mg/dL    Calcium 8.2 (L) 8.5 - 10.1 mg/dL    Glucose 85 " 70 - 99 mg/dL    Alkaline Phosphatase 60 40 - 150 U/L    AST 9 0 - 45 U/L    ALT 24 0 - 50 U/L    Protein Total 7.1 6.8 - 8.8 g/dL    Albumin 3.4 3.4 - 5.0 g/dL    Bilirubin Total 0.4 0.2 - 1.3 mg/dL    GFR Estimate >90 >60 mL/min/1.73m2   CRP, inflammation     Status: Abnormal   Result Value Ref Range    CRP Inflammation 17.5 (H) 0.0 - 8.0 mg/L   ESR: Erythrocyte sedimentation rate     Status: Normal   Result Value Ref Range    Erythrocyte Sedimentation Rate 9 0 - 20 mm/hr   Lyme Disease Amanda with reflex to WB Serum     Status: Normal   Result Value Ref Range    Lyme Disease Antibodies Total 0.11 <0.90     Results for orders placed or performed in visit on 11/10/21 (from the past 24 hour(s))   Comprehensive metabolic panel (BMP + Alb, Alk Phos, ALT, AST, Total. Bili, TP)   Result Value Ref Range    Sodium 138 133 - 144 mmol/L    Potassium 3.9 3.4 - 5.3 mmol/L    Chloride 107 94 - 109 mmol/L    Carbon Dioxide (CO2) 27 20 - 32 mmol/L    Anion Gap 4 3 - 14 mmol/L    Urea Nitrogen 10 7 - 30 mg/dL    Creatinine 0.68 0.52 - 1.04 mg/dL    Calcium 8.2 (L) 8.5 - 10.1 mg/dL    Glucose 85 70 - 99 mg/dL    Alkaline Phosphatase 60 40 - 150 U/L    AST 9 0 - 45 U/L    ALT 24 0 - 50 U/L    Protein Total 7.1 6.8 - 8.8 g/dL    Albumin 3.4 3.4 - 5.0 g/dL    Bilirubin Total 0.4 0.2 - 1.3 mg/dL    GFR Estimate >90 >60 mL/min/1.73m2   CRP, inflammation   Result Value Ref Range    CRP Inflammation 17.5 (H) 0.0 - 8.0 mg/L   ESR: Erythrocyte sedimentation rate   Result Value Ref Range    Erythrocyte Sedimentation Rate 9 0 - 20 mm/hr   Lyme Disease Amanda with reflex to WB Serum   Result Value Ref Range    Lyme Disease Antibodies Total 0.11 <0.90

## 2021-11-10 ENCOUNTER — OFFICE VISIT (OUTPATIENT)
Dept: FAMILY MEDICINE | Facility: OTHER | Age: 36
End: 2021-11-10
Payer: COMMERCIAL

## 2021-11-10 VITALS
HEART RATE: 84 BPM | TEMPERATURE: 98.7 F | OXYGEN SATURATION: 98 % | RESPIRATION RATE: 16 BRPM | DIASTOLIC BLOOD PRESSURE: 70 MMHG | SYSTOLIC BLOOD PRESSURE: 120 MMHG | WEIGHT: 203 LBS | BODY MASS INDEX: 33.78 KG/M2
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
## 2021-11-10 DIAGNOSIS — L29.9 ITCHING: ICD-10-CM

## 2021-11-10 DIAGNOSIS — S70.369A INSECT BITE OF THIGH, UNSPECIFIED LATERALITY, INITIAL ENCOUNTER: ICD-10-CM

## 2021-11-10 DIAGNOSIS — R21 RASH AND NONSPECIFIC SKIN ERUPTION: Primary | ICD-10-CM

## 2021-11-10 DIAGNOSIS — L23.7 ALLERGIC CONTACT DERMATITIS DUE TO PLANTS, EXCEPT FOOD: ICD-10-CM

## 2021-11-10 LAB
ALBUMIN SERPL-MCNC: 3.4 G/DL (ref 3.4–5)
ALP SERPL-CCNC: 60 U/L (ref 40–150)
ALT SERPL W P-5'-P-CCNC: 24 U/L (ref 0–50)
ANION GAP SERPL CALCULATED.3IONS-SCNC: 4 MMOL/L (ref 3–14)
AST SERPL W P-5'-P-CCNC: 9 U/L (ref 0–45)
B BURGDOR IGG+IGM SER QL: 0.11
BILIRUB SERPL-MCNC: 0.4 MG/DL (ref 0.2–1.3)
BUN SERPL-MCNC: 10 MG/DL (ref 7–30)
CALCIUM SERPL-MCNC: 8.2 MG/DL (ref 8.5–10.1)
CHLORIDE BLD-SCNC: 107 MMOL/L (ref 94–109)
CO2 SERPL-SCNC: 27 MMOL/L (ref 20–32)
CREAT SERPL-MCNC: 0.68 MG/DL (ref 0.52–1.04)
CRP SERPL-MCNC: 17.5 MG/L (ref 0–8)
ERYTHROCYTE [SEDIMENTATION RATE] IN BLOOD BY WESTERGREN METHOD: 9 MM/HR (ref 0–20)
GFR SERPL CREATININE-BSD FRML MDRD: >90 ML/MIN/1.73M2
GLUCOSE BLD-MCNC: 85 MG/DL (ref 70–99)
POTASSIUM BLD-SCNC: 3.9 MMOL/L (ref 3.4–5.3)
PROT SERPL-MCNC: 7.1 G/DL (ref 6.8–8.8)
SODIUM SERPL-SCNC: 138 MMOL/L (ref 133–144)

## 2021-11-10 PROCEDURE — 86618 LYME DISEASE ANTIBODY: CPT | Performed by: FAMILY MEDICINE

## 2021-11-10 PROCEDURE — 99214 OFFICE O/P EST MOD 30 MIN: CPT | Performed by: FAMILY MEDICINE

## 2021-11-10 PROCEDURE — 80053 COMPREHEN METABOLIC PANEL: CPT | Performed by: FAMILY MEDICINE

## 2021-11-10 PROCEDURE — 36415 COLL VENOUS BLD VENIPUNCTURE: CPT | Performed by: FAMILY MEDICINE

## 2021-11-10 PROCEDURE — 85652 RBC SED RATE AUTOMATED: CPT | Performed by: FAMILY MEDICINE

## 2021-11-10 PROCEDURE — 86140 C-REACTIVE PROTEIN: CPT | Performed by: FAMILY MEDICINE

## 2021-11-10 RX ORDER — TRIAMCINOLONE ACETONIDE 1 MG/G
OINTMENT TOPICAL 2 TIMES DAILY
Qty: 80 G | Refills: 1 | Status: SHIPPED | OUTPATIENT
Start: 2021-11-10

## 2021-11-10 RX ORDER — DOXYCYCLINE HYCLATE 20 MG
20 TABLET ORAL 2 TIMES DAILY
COMMUNITY
Start: 2021-10-15

## 2021-11-10 RX ORDER — PREDNISONE 10 MG/1
TABLET ORAL
COMMUNITY
Start: 2021-10-26 | End: 2021-11-10

## 2021-11-10 RX ORDER — HYDROXYZINE HYDROCHLORIDE 25 MG/1
TABLET, FILM COATED ORAL
COMMUNITY
Start: 2021-10-26 | End: 2021-11-10

## 2021-11-10 RX ORDER — PERMETHRIN 50 MG/G
CREAM TOPICAL
Qty: 60 G | Refills: 1 | Status: SHIPPED | OUTPATIENT
Start: 2021-11-10

## 2021-11-10 NOTE — PATIENT INSTRUCTIONS
Thank you for visiting [facility]    Take an antihistamine (Claritin, Zyrtec, Allegra, Benadryl or similar generic - loratadine, cetirizine, fexofenadine, diphenhydramine) to help with your symptoms.     Can also use triamcinolone to help with itching and dryness.    We'll let you know your lab results as soon as we can.     Let's also try permethrin cream in case this is some sort of mite that is causing your symptoms.    Finally, I will put in a referral to your dermatologist to take a look.    Contact us or return if questions or concerns.     Have a nice day!    Dr. Davalos     No follow-ups on file.      If you need medication refills, please contact your pharmacy 3 days before your prescriptions runs out or download the [facility] miguel for your smart phone. If you are out of refills, your pharmacy will contact contact the clinic.                                     MyChart Assistance [phone]

## 2021-11-11 NOTE — RESULT ENCOUNTER NOTE
Lucero,    All of your labs were normal for you.  No clear cause for your rash seen.  If our trial today does not help, then follow-up with dermatology.    Have a nice day!    Dr. Davalos

## 2021-12-30 ENCOUNTER — E-VISIT (OUTPATIENT)
Dept: FAMILY MEDICINE | Facility: OTHER | Age: 36
End: 2021-12-30
Payer: COMMERCIAL

## 2021-12-30 DIAGNOSIS — J01.00 ACUTE NON-RECURRENT MAXILLARY SINUSITIS: Primary | ICD-10-CM

## 2021-12-30 PROCEDURE — 99421 OL DIG E/M SVC 5-10 MIN: CPT | Performed by: FAMILY MEDICINE

## 2021-12-30 RX ORDER — FLUTICASONE PROPIONATE 50 MCG
1 SPRAY, SUSPENSION (ML) NASAL DAILY
Qty: 20 G | Refills: 1 | Status: SHIPPED | OUTPATIENT
Start: 2021-12-30

## 2021-12-30 RX ORDER — DEXAMETHASONE 6 MG/1
6 TABLET ORAL DAILY
Qty: 5 TABLET | Refills: 0 | Status: SHIPPED | OUTPATIENT
Start: 2021-12-30 | End: 2022-02-25

## 2021-12-30 NOTE — PATIENT INSTRUCTIONS
When to Use Antibiotics    Antibiotics are medicines used to treat infections caused by bacteria. They don t work for an illness caused by a virus. And they don't work for an allergic reaction. In fact, taking antibiotics for reasons other than an infection by bacteria can cause problems. You may have side effects from the medicine. And if you need an antibiotic in the future, it may not work well. This is because the bacteria can become immune to the medicine. You can also get a type of diarrhea that's hard to treat. This diarrhea is called C. diff.   When antibiotics likely won t help  Your healthcare provider won t usually give you antibiotics for the conditions listed below. You can help by not asking for them if you have:     A cold. This type of illness is caused by a virus. It can cause a runny nose, stuffed-up nose, sneezing, coughing, and headache. You may also have mild body aches and low fever. A cold gets better on its own in a few days to a week.    The flu (influenza). This is a respiratory illness caused by a virus. The flu usually goes away on its own in a week or so. It can cause fever, body aches, sore throat, and tiredness.    Bronchitis. This is an infection in the lungs. It is most often caused by a virus. You may have coughing, phlegm, body aches, and a low fever. A common type of bronchitis is known as a chest cold. This is called acute bronchitis. This often happens after you have a respiratory infection like a cold. Bronchitis can take weeks to go away. Antibiotics often don t help.    Most sore throats. Sore throats are most often caused by viruses. Your throat may feel scratchy or achy. It may hurt to swallow. You may also have a low fever and body aches. A sore throat usually gets better in a few days.    Most outer ear infections. An ear infection may be caused by a virus or bacteria. It causes pain in the ear. Antibiotics by mouth usually don t help. Low-dose antibiotic ear drops  work much better.    Some inner ear infections. An inner ear infection (otitis media) can be caused by a virus in the ear. It can also cause pain and a high fever. Most older children with low-grade fever don't need to be treated with antibiotics.    Most sinus infections. This is also known as sinusitis. This kind of infection causes sinus pain and swelling, and a runny nose. In most cases, it goes away on its own. Antibiotics don t make recovery quicker.    Allergic rhinitis. This is a set of symptoms caused by an allergic reaction. You may have sneezing, a runny nose, itchy or watery eyes, or a sore throat. Allergies are not treated with antibiotics.    Low fever. A mild fever that s less than 100.4 F (38 C) most likely doesn t need to be treated with antibiotics.   When antibiotics can help  Antibiotics can be used to treat:                                                       Strep throat. This is a throat infection caused by a certain type of bacteria. Symptoms of strep throat include a sore throat, white patches on the tonsils, red spots on the roof of the mouth, fever, body aches, and nausea and vomiting. Strep throat almost never causes a cough.    Urinary tract infection (UTI). This is an infection of the bladder and the tube that takes urine out of the body. It is caused by bacteria. It can cause burning pain and urine that s cloudy or tinted with blood. UTIs are very common. Antibiotics usually help treat them.    Some outer ear infections. In some cases, a healthcare provider may prescribe antibiotics by mouth for an ear infection. You may need a test to show the cause of the ear infection.    Some sinus infections. In some cases, your healthcare provider may give you antibiotics. He or she may first need to make sure your symptoms aren t caused by something else. This may be a virus, fungus, allergies, or air pollutants such as smoke.   Your healthcare provider may give you antibiotics if you have a  condition that can affect your immune system. This includes diabetes or cancer.  Self-care at home  If your infection can t be treated with antibiotics, you can take other steps to feel better. Try the remedies below. In general:     Rest and sleep as much as needed.    Drink water and other clear fluids.    Don t smoke. Stay away from smoke from other people.    Use over-the-counter medicine such as acetaminophen or ibuprofen to ease pain or fever, as directed by your healthcare provider.  To treat sinus pain or nasal stuffiness:    Put a warm, moist cloth on your face where you feel sinus pain or pressure.    Try a nasal spray with medicine or saline. Use as directed by your healthcare provider.    Breathe in steam from a hot shower.    Use a humidifier or cool mist vaporizer.   To quiet a cough:     Use a humidifier or cool mist vaporizer.    Breathe in steam from a hot shower.    Suck on cough lozenges.   To sooth a sore throat:     Suck on ice chips, frozen ice pops, or lozenges.    Use a sore throat spray.    Use a humidifier or cool mist vaporizer.    Gargle with saltwater.    Drink warm liquids.    Take ibuprofen to reduce swelling and pain.  To ease ear pain:     Hold a warm, moist washcloth on the ear for 10 minutes at a time.  [employer] last reviewed this educational content on 12/1/2019 2000-2021 [employer]. All rights reserved. This information is not intended as a substitute for professional medical care. Always follow your healthcare professional's instructions.        Dear Lucero Ku    After reviewing your responses, I've been able to diagnose you with?a sinus infection caused by a virus.?     Based on your responses and diagnosis, I have prescribed Flonase to treat your symptoms. I have sent this to your pharmacy.?     If not improving in the next few days, we can do an oral steroid, but these do have more side effects.    It is also important to stay well hydrated, get lots  of rest and take over-the-counter decongestants,?tylenol?or ibuprofen if you?are able to?take those medications per your primary care provider to help relieve discomfort.?     It is important that you take?all of?your prescribed medication even if your symptoms are improving after a few doses.? Taking?all of?your medicine helps prevent the symptoms from returning.?     If your symptoms worsen, you develop severe headache, vomiting, high fever (>102), or are not improving in 7 days, please contact your primary care provider for an appointment or visit any of our convenient Walk-in Care or Urgent Care Centers to be seen which can be found on our website?here.?     Thanks again for choosing?us?as your health care partner,?   ?  Elmer Davalos MD, MD?

## 2022-02-25 ENCOUNTER — OFFICE VISIT (OUTPATIENT)
Dept: OBGYN | Facility: OTHER | Age: 37
End: 2022-02-25
Payer: COMMERCIAL

## 2022-02-25 VITALS — SYSTOLIC BLOOD PRESSURE: 123 MMHG | DIASTOLIC BLOOD PRESSURE: 74 MMHG | WEIGHT: 199 LBS | BODY MASS INDEX: 33.12 KG/M2

## 2022-02-25 DIAGNOSIS — E28.2 PCOS (POLYCYSTIC OVARIAN SYNDROME): ICD-10-CM

## 2022-02-25 DIAGNOSIS — Z30.41 ENCOUNTER FOR SURVEILLANCE OF CONTRACEPTIVE PILLS: ICD-10-CM

## 2022-02-25 DIAGNOSIS — Z00.00 ANNUAL PHYSICAL EXAM: Primary | ICD-10-CM

## 2022-02-25 LAB
ALBUMIN SERPL-MCNC: 3.5 G/DL (ref 3.4–5)
ALP SERPL-CCNC: 60 U/L (ref 40–150)
ALT SERPL W P-5'-P-CCNC: 28 U/L (ref 0–50)
ANION GAP SERPL CALCULATED.3IONS-SCNC: 2 MMOL/L (ref 3–14)
AST SERPL W P-5'-P-CCNC: 11 U/L (ref 0–45)
BILIRUB SERPL-MCNC: 0.4 MG/DL (ref 0.2–1.3)
BUN SERPL-MCNC: 7 MG/DL (ref 7–30)
CALCIUM SERPL-MCNC: 9.2 MG/DL (ref 8.5–10.1)
CHLORIDE BLD-SCNC: 107 MMOL/L (ref 94–109)
CHOLEST SERPL-MCNC: 198 MG/DL
CO2 SERPL-SCNC: 30 MMOL/L (ref 20–32)
CREAT SERPL-MCNC: 0.66 MG/DL (ref 0.52–1.04)
ERYTHROCYTE [DISTWIDTH] IN BLOOD BY AUTOMATED COUNT: 13.3 % (ref 10–15)
FASTING STATUS PATIENT QL REPORTED: YES
GFR SERPL CREATININE-BSD FRML MDRD: >90 ML/MIN/1.73M2
GLUCOSE BLD-MCNC: 97 MG/DL (ref 70–99)
HCT VFR BLD AUTO: 40.4 % (ref 35–47)
HDLC SERPL-MCNC: 53 MG/DL
HGB BLD-MCNC: 13.2 G/DL (ref 11.7–15.7)
LDLC SERPL CALC-MCNC: 121 MG/DL
MCH RBC QN AUTO: 28 PG (ref 26.5–33)
MCHC RBC AUTO-ENTMCNC: 32.7 G/DL (ref 31.5–36.5)
MCV RBC AUTO: 86 FL (ref 78–100)
NONHDLC SERPL-MCNC: 145 MG/DL
PLATELET # BLD AUTO: 214 10E3/UL (ref 150–450)
POTASSIUM BLD-SCNC: 4.2 MMOL/L (ref 3.4–5.3)
PROT SERPL-MCNC: 7.2 G/DL (ref 6.8–8.8)
RBC # BLD AUTO: 4.72 10E6/UL (ref 3.8–5.2)
SODIUM SERPL-SCNC: 139 MMOL/L (ref 133–144)
TRIGL SERPL-MCNC: 122 MG/DL
TSH SERPL DL<=0.005 MIU/L-ACNC: 3.11 MU/L (ref 0.4–4)
WBC # BLD AUTO: 5.5 10E3/UL (ref 4–11)

## 2022-02-25 PROCEDURE — 87624 HPV HI-RISK TYP POOLED RSLT: CPT | Performed by: OBSTETRICS & GYNECOLOGY

## 2022-02-25 PROCEDURE — 99395 PREV VISIT EST AGE 18-39: CPT | Performed by: OBSTETRICS & GYNECOLOGY

## 2022-02-25 PROCEDURE — 84443 ASSAY THYROID STIM HORMONE: CPT | Performed by: OBSTETRICS & GYNECOLOGY

## 2022-02-25 PROCEDURE — 80061 LIPID PANEL: CPT | Performed by: OBSTETRICS & GYNECOLOGY

## 2022-02-25 PROCEDURE — 80053 COMPREHEN METABOLIC PANEL: CPT | Performed by: OBSTETRICS & GYNECOLOGY

## 2022-02-25 PROCEDURE — 36415 COLL VENOUS BLD VENIPUNCTURE: CPT | Performed by: OBSTETRICS & GYNECOLOGY

## 2022-02-25 PROCEDURE — G0145 SCR C/V CYTO,THINLAYER,RESCR: HCPCS | Performed by: OBSTETRICS & GYNECOLOGY

## 2022-02-25 PROCEDURE — 85027 COMPLETE CBC AUTOMATED: CPT | Performed by: OBSTETRICS & GYNECOLOGY

## 2022-02-25 RX ORDER — NORGESTIMATE AND ETHINYL ESTRADIOL 7DAYSX3 LO
1 KIT ORAL DAILY
Qty: 84 TABLET | Refills: 3 | Status: SHIPPED | OUTPATIENT
Start: 2022-02-25 | End: 2023-02-09

## 2022-02-25 NOTE — PATIENT INSTRUCTIONS
PREVENTIVE HEALTH RECOMMENDATIONS:   Get a physical every year.  A pap test is important to have done starting at age 21 and then every three years as long as your pap is normal.  When you receive the results of your pap test it will include when you need to have your next pap test.    You should be tested each year for chlamydia and gonorrhea if you are aged 16-25 and if you have had a new sexual partner since you were last tested.   Vaccines: Get a flu shot each year.   Eat at least 8-10 servings of fruits and vegetables daily.  Eat whole-grain bread and cereal, whole-wheat pasta and brown rice instead of white grains and white rice.   For bone health: Eat calcium-rich foods (dairy products) or take calcium pills (500 to 600 mg with vitamin D) twice a day with food.   Exercise for an average of 30 minutes a day, 5 days of the week. It can be as simple as taking a brisk walk.  This will help you control your weight and prevent many diseases.   Limit alcohol to one drink per day.   Don't smoke and limit your exposure to second hand smoke.  If you smoke consider making a plan to quit. Go to [employer] and clink on   [employer]  for help   Wear sunscreen with at least SPF15 to prevent skin damage and skin cancer.   Brush your teeth twice a day and floss once a day and see your dentist twice a year for an exam and cleaning.   Have a great year and I will look forward to seeing you next year.   Chelo Mart DO    If you have labs or imaging done, the results will automatically release in [facility] without an interpretation.  Your health care professional will review those results and send an interpretation with recommendations as soon as possible, but this may be 1-3 business days.    If you have any questions regarding your visit, please contact your care team.     [employer] Access Services: [phone]  Women s Health CLINIC HOURS TELEPHONE NUMBER       DO Luna Hallman   CARLA Castro-RICK Alonso-RICK Motta-RICK Lindo-  [place]-     Monday- [place]  8:00 a.m - 5:00 p.m    Tuesday- [place]  8:00 a.m - 5:00 p.m    Wednesday- OFF    Thursday- Surgery     Friday- [place]  8:00 a.m - 5:00 p.m. [facility]  [address]  [place] [address]  [phone] Fax  Imaging [phone]    [facility]  [address] [place] [address] [place] [address]  [phone]    [address] [place] [address]  [phone] Fax  Imaging [phone]     Urgent Care locations:    [facility] Monday-Friday  10 am - 8 pm  Saturday and Sunday   9 am - 5 pm  Monday-Friday   10 am - 8 pm  Saturday and Sunday   9 am - 5 pm   [phone]     If you need a medication refill, please contact your pharmacy. Please allow 3 business days for your refill to be completed.    As always, thank you for trusting us with your healthcare needs!

## 2022-02-25 NOTE — PROGRESS NOTES
No chief complaint on file.      Subjective  Lucero Ku is a 36 year old female who presents for her annual exam.  Patient has PCOS.  She was placed on oral contractive pills and her menses are now very regular.  She bleeds for 6 days.  Patient uses both pads and tampons.  Her last menstrual period was 2/1.  2 c sections, last with TL.  2 sets of twins.   No problems urinating.  Normal bowel movements for her.  Patient is sexually active.  No dyspareunia.  Occasional vaginal spotting after.  Patient desires the flu vaccine today.      Most recent pap: 2019  History of abnormal Pap smear:  No  History of STI's:  No  History of PID:  No    Family history of uterine cancer:  No  Family history of ovarian cancer:  No  Family history of colon cancer:   No  Family history of breast cancer:  Maternal grandmother    ROS  ROS: 10 point ROS neg other than the symptoms noted above in the HPI.    Past Medical History:   Diagnosis Date     Hashimoto's thyroiditis      Infertility, female     in vitro, tiwn birth     PCOS (polycystic ovarian syndrome)      Past Surgical History:   Procedure Laterality Date      SECTION  3/4/2014    Procedure:  SECTION;;  Surgeon: Paige Ly MD;  Location: [facility] L+D      SECTION, TUBAL LIGATION, COMBINED  2017    [facility]. left partial salpingectomy;  right salpingectomy including fimbria      ELBOW SURGERY      Right elbow     HC TOOTH EXTRACTION W/FORCEP       HYSTEROSCOPY DIAGNOSTIC  2011     Family History   Problem Relation Age of Onset     Hypertension Mother      Hypertension Father      Cancer Father 55        Stage IV Kidney ()     Thyroid Disease Father         hypothyroidism     Diabetes Maternal Grandfather      Hypertension Maternal Grandfather      Lipids Maternal Grandfather         Defibrilator     Cancer Paternal Grandmother      Thyroid Disease Paternal Grandmother      Hypertension Paternal Grandmother      Cancer  Paternal Grandfather      Thyroid Disease Paternal Grandfather      Hypertension Paternal Grandfather      Hypertension Sister      Sleep Apnea Sister      Anxiety Disorder Sister      Hypertension Brother      Anxiety Disorder Brother      Social History     Tobacco Use     Smoking status: Never Smoker     Smokeless tobacco: Never Used     Tobacco comment: No smokers in home.   Substance Use Topics     Alcohol use: Yes     Comment: social       Tobacco abuse:  No  Do you get at least three servings of calcium containing foods daily (dairy, green leafy vegetables, etc.)? yes   Outside of work or daily activities, how many days per week do you exercise for 30 minutes or longer? 3-4x per week  The patient does not drink >3 drinks per day nor >7 drinks per week.   Have you had an eye exam in the past two years? yes   Do you see a dentist twice per year? yes   Today's PHQ-2 Score:   Abuse: Current or Past(Physical, Sexual or Emotional)- No   Do you feel safe in your environment - Yes  Objective  Vitals: /74 (BP Location: Right arm, Cuff Size: Adult Regular)   Wt 90.3 kg (199 lb)   LMP 02/01/2022   Breastfeeding No   BMI 33.12 kg/m    BMI= Body mass index is 33.12 kg/m .    General appearance=well developed, well-nourished female  Gait=normal  Psych=mood is stable, alert and oriented x3  HEENT=mucous membranes moist  Skin=no rashes or lesions seen,normal turgor   Breast:  Benign exam, no masses palpated.  No skin changes, no axillary lymphadenopathy, no nipple discharge.  Axilla feel completely normal, no lymph node enlargement and non-tender.  Neck=overall appearance is normal  Heart=RRR, no murmurs, no swelling noted  Thyroid=normal, no masses, no TTP, no enlargement  Lungs=non-labored breathing, no use of accessory muscles, clear to ausculation bilaterally  Abd=soft, Nontender/nondistended, +bowel sounds x4, no masses, no signs of hernias, no evidence of hepatosplenomegaly  PELVIC:    External genitalia:  normal without lesions or masses  Urethral meatus: no lesions or prolapse noted, normal size  Urethra: no masses, non tender  Bladder: non tender, no fullness  Vagina: normal mucosa and rugae, no discharge.  Cervix: normal without lesion, no cervical motion tenderness, healthy, multiparous, pap smear obtained  Uterus: small, mobile, nontender.  Adnexa: non tender, without masses  Rectal: deferred  Ext=no clubbing or cyanosis, no swelling      Last lipid profile: 2019  Regular self breast exam:  Yes  Most recent mammogram:  N/A  History of abnormal mammogram: N/A  Fit testing:  N/A  DEXA:  N/A        Assessment/Plan  1.)  Annual/well woman exam=CBC, CMP, lipids, TSH, flu vaccine, pap smear  2.)  Birth control=ocp refilled  3.)  PCOS      The following topics were discussed or recommended   Discussed seat belt, helmet and sunscreen use  Vision screening   Calcium/Vitamin D supplement=Recommended 1000 mg of calcium daily and 800 IU of vitamin D.  Contraception     30 minutes were spent on the date of the encounter doing chart review, history and exam, documentation, and further activities as noted above.        Chelo Mart DO

## 2022-02-28 ENCOUNTER — MYC MEDICAL ADVICE (OUTPATIENT)
Dept: OBGYN | Facility: OTHER | Age: 37
End: 2022-02-28
Payer: COMMERCIAL

## 2022-02-28 NOTE — TELEPHONE ENCOUNTER
Pt last seen 2/25/2022 for annual exam.    RN routing to provider for f/u.    Gloria Rodríguez RN on 2/28/2022 at 10:11 AM

## 2022-03-01 LAB
BKR LAB AP GYN ADEQUACY: NORMAL
BKR LAB AP GYN INTERPRETATION: NORMAL
BKR LAB AP HPV REFLEX: NORMAL
BKR LAB AP LMP: NORMAL
BKR LAB AP PREVIOUS ABNORMAL: NORMAL
PATH REPORT.COMMENTS IMP SPEC: NORMAL
PATH REPORT.COMMENTS IMP SPEC: NORMAL
PATH REPORT.RELEVANT HX SPEC: NORMAL

## 2022-03-02 LAB
HUMAN PAPILLOMA VIRUS 16 DNA: NEGATIVE
HUMAN PAPILLOMA VIRUS 18 DNA: NEGATIVE
HUMAN PAPILLOMA VIRUS FINAL DIAGNOSIS: NORMAL
HUMAN PAPILLOMA VIRUS OTHER HR: NEGATIVE

## 2022-07-12 ENCOUNTER — OFFICE VISIT (OUTPATIENT)
Dept: ALLERGY | Facility: OTHER | Age: 37
End: 2022-07-12
Payer: COMMERCIAL

## 2022-07-12 VITALS
HEART RATE: 73 BPM | HEIGHT: 64 IN | WEIGHT: 200 LBS | DIASTOLIC BLOOD PRESSURE: 78 MMHG | BODY MASS INDEX: 34.15 KG/M2 | SYSTOLIC BLOOD PRESSURE: 116 MMHG | OXYGEN SATURATION: 100 %

## 2022-07-12 DIAGNOSIS — L30.9 DERMATITIS: Primary | ICD-10-CM

## 2022-07-12 DIAGNOSIS — L29.9 ITCHING: ICD-10-CM

## 2022-07-12 PROCEDURE — 99203 OFFICE O/P NEW LOW 30 MIN: CPT | Performed by: ALLERGY & IMMUNOLOGY

## 2022-07-12 ASSESSMENT — ENCOUNTER SYMPTOMS
WHEEZING: 0
RHINORRHEA: 0
FEVER: 0
CHEST TIGHTNESS: 0
COUGH: 0
SINUS PRESSURE: 0
MYALGIAS: 0
EYE DISCHARGE: 0
EYE ITCHING: 0
ADENOPATHY: 0
SHORTNESS OF BREATH: 0
FACIAL SWELLING: 0
EYE REDNESS: 0
ACTIVITY CHANGE: 0
CHILLS: 0
ARTHRALGIAS: 0

## 2022-07-12 NOTE — PROGRESS NOTES
"SUBJECTIVE:                                                                   Lucero Ku presents today to our Allergy Clinic at [facility]; She is being seen  for a new patient visit.  She is a 36 year old female with concerns for rash. In 2021, Lucero developed a pruritic rash on her lower back and posterior aspect of both extremities after [place].  It was persistent on the same spot for weeks.  Was not coming and going.  Initially, she tried and failed over-the-counter antihistamines, topical steroids, and even prednisone.  After she failed this treatment, in November, she was seen at the [facility] and was treated with Elimite and triamcinolone.  She found Elimite helpful.  Soon after using it, her symptoms resolved.  However, within the past couple of months, she has been having some \"chicken skin \"rashes on her arms that may last for days.  She shows me the pictures.  Nonspecific, erythematous scabs in the elbow region noted.  Also, since 2021, she has been experiencing pruritus in the waistline region every time she takes her jeans off.  She has no such symptoms after wearing loose garments.  She wonders if she may have a latex allergy.  She has no history of urticaria, angioedema, wheezing, chest tightness, shortness of breath with latex exposure.    Has a known issue with metals.  She cannot wear cheap jewelry because it will cause rash.    She was seen in 2017 for eustachian tube dysfunction.  Serum IgE for regional aeroallergen panel was essentially negative at that time.      Patient Active Problem List   Diagnosis     History of 2  sections     PCOS (polycystic ovarian syndrome)     Hashimoto's thyroiditis     Chronic rhinitis     Dysfunction of Eustachian tube, right     Segmental dysfunction of lumbar region     Segmental dysfunction of sacral region     Segmental dysfunction of thoracic region     Mechanical " back pain     Menometrorrhagia     Encounter for surveillance of contraceptive pills       Past Medical History:   Diagnosis Date     Hashimoto's thyroiditis      Infertility, female     in vitro, tiwn birth     PCOS (polycystic ovarian syndrome)       Problem (# of Occurrences) Relation (Name,Age of Onset)    Anxiety Disorder (2) Sister, Brother    Cancer (3) Father (55): Stage IV Kidney (), Paternal Grandmother, Paternal Grandfather    Diabetes (1) Maternal Grandfather    Hypertension (7) Mother, Father, Maternal Grandfather, Paternal Grandmother, Paternal Grandfather, Sister, Brother    Lipids (1) Maternal Grandfather: Defibrilator    Sleep Apnea (1) Sister    Thyroid Disease (3) Father: hypothyroidism, Paternal Grandmother, Paternal Grandfather        Past Surgical History:   Procedure Laterality Date      SECTION  3/4/2014    Procedure:  SECTION;;  Surgeon: Paige Ly MD;  [facility]      SECTION, TUBAL LIGATION, COMBINED  2017    [facility]. left partial salpingectomy;  right salpingectomy including fimbria      ELBOW SURGERY      Right elbow     HC TOOTH EXTRACTION W/FORCEP       HYSTEROSCOPY DIAGNOSTIC  2011     Social History     Socioeconomic History     Marital status:      Spouse name: None     Number of children: 2     Years of education: None     Highest education level: None   Occupational History     Occupation:      Employer: [employer]     Comment: [employer]   Tobacco Use     Smoking status: Never Smoker     Smokeless tobacco: Never Used     Tobacco comment: No smokers in home.   Vaping Use     Vaping Use: Never used   Substance and Sexual Activity     Alcohol use: Yes     Comment: social     Drug use: No     Sexual activity: Yes     Partners: Male     Birth control/protection: None, Female Surgical   Other Topics Concern     Parent/sibling w/ CABG, MI or angioplasty before 65F 55M? No      Service No      Blood Transfusions No     Caffeine Concern No     Occupational Exposure Yes     Comment: [employer]     Hobby Hazards No     Sleep Concern No     Stress Concern No     Weight Concern No     Special Diet No     Back Care No     Exercise No     Seat Belt Yes   Social History Narrative     to [employer] and lives in [place].  No smokers in the home.  No concerns about domestic violence.  Has an indoor cat.  Aware of toxoplasmosis precautions.              ENVIRONMENTAL HISTORY: [place] home is heated with a forced air and gas furnace. They do have central air conditioning. The patient's bedroom is furnished with carpeting in bedroom.  Pets inside the house include 0 pets. There is no history of cockroach or mice infestation. There is/are 0 smokers in the house.  The house does not have a damp basement.            Review of Systems   Constitutional: Negative for activity change, chills and fever.   HENT: Negative for congestion, ear discharge, facial swelling, nosebleeds, postnasal drip, rhinorrhea, sinus pressure and sneezing.    Eyes: Negative for discharge, redness and itching.   Respiratory: Negative for cough, chest tightness, shortness of breath and wheezing.    Musculoskeletal: Negative for arthralgias and myalgias.   Skin:        Pruritus of the skin+   Allergic/Immunologic: Negative for environmental allergies.   Hematological: Negative for adenopathy.           Current Outpatient Medications:      doxycycline hyclate (PERIOSTAT) 20 MG tablet, Take 20 mg by mouth 2 times daily, Disp: , Rfl:      fluticasone (FLONASE) 50 MCG/ACT nasal spray, Spray 1 spray into both nostrils daily, Disp: 20 g, Rfl: 1     norgestim-eth estrad triphasic (ORTHO TRI-CYCLEN LO) 0.18/0.215/0.25 MG-25 MCG tablet, Take 1 tablet by mouth daily, Disp: 84 tablet, Rfl: 3     permethrin (ELIMITE) 5 % external cream, Apply cream from head to toe (except the face); leave on for 8-14 hours then  "wash off with water; reapply in 1 week if live mites appear., Disp: 60 g, Rfl: 1     triamcinolone (KENALOG) 0.1 % external ointment, Apply topically 2 times daily, Disp: 80 g, Rfl: 1  Immunization History   Administered Date(s) Administered     COVID-19,PF,Pfizer (12+ Yrs) 05/14/2021, 06/04/2021     HPV Quadrivalent 09/05/2007, 11/12/2007, 03/06/2008     Influenza Vaccine IM > 6 months Valent IIV4 (Alfuria,Fluzone) 01/14/2014, 11/03/2014, 09/22/2015, 10/11/2016, 10/16/2017, 09/21/2018, 10/08/2019, 10/16/2020     MMR 08/23/1999, 01/28/2013     TD (ADULT, 7+) 08/23/1999     TDAP Vaccine (Adacel) 01/28/2014, 10/30/2017     Tdap (Adacel,Boostrix) 09/20/2011     Allergies   Allergen Reactions     Hydrocodone-Acetaminophen Nausea and Vomiting     Reaction after taking Vicodin for tooth extraction.  Can take plain Tylenol.     OBJECTIVE:                                                                 /78   Pulse 73   Ht 1.619 m (5' 3.75\")   Wt 90.7 kg (200 lb)   SpO2 100%   BMI 34.60 kg/m          Physical Exam  Vitals and nursing note reviewed.   Constitutional:       General: She is not in acute distress.     Appearance: She is not ill-appearing, toxic-appearing or diaphoretic.   HENT:      Head: Normocephalic and atraumatic.      Right Ear: Tympanic membrane, ear canal and external ear normal.      Left Ear: Tympanic membrane, ear canal and external ear normal.      Nose: No congestion or rhinorrhea.      Right Turbinates: Not enlarged, swollen or pale.      Left Turbinates: Not enlarged, swollen or pale.      Mouth/Throat:      Lips: Pink.      Mouth: Mucous membranes are moist.      Pharynx: Oropharynx is clear. No pharyngeal swelling, oropharyngeal exudate, posterior oropharyngeal erythema or uvula swelling.   Eyes:      General:         Right eye: No discharge.         Left eye: No discharge.      Conjunctiva/sclera: Conjunctivae normal.   Pulmonary:      Effort: Pulmonary effort is normal. No " respiratory distress.      Breath sounds: Normal breath sounds and air entry. No stridor, decreased air movement or transmitted upper airway sounds. No decreased breath sounds, wheezing, rhonchi or rales.   Skin:     General: Skin is warm.      Comments: one 2 mm nonspecific scab on the abdomen noted.  Otherwise, no dermatitis or urticaria.   Neurological:      Mental Status: She is alert and oriented to person, place, and time.   Psychiatric:         Mood and Affect: Mood normal.         Behavior: Behavior normal.             ASSESSMENT/PLAN:    Dermatitis  Itching    For pruritus after wearing jeans, I recommend to switch to loose clothes.  She may try a dress or a skirt.  Has symptoms are not consistent with latex allergy.  Possible contact dermatitis, but interestingly her symptoms initially did not respond to prednisone.  Later, symptoms improved with triamcinolone and Elimite.  The patient is interested in patch testing, but her current dermatologist is out of network.  - Referred to Dr. Brown for comprehensive patch testing.    - Adult Dermatology Referral       Return if symptoms worsen or fail to improve.    Thank you for allowing us to participate in the care of this patient. Please feel free to contact us if there are any questions or concerns about the patient.    Disclaimer: This note consists of symbols derived from keyboarding, dictation and/or voice recognition software. As a result, there may be errors in the script that have gone undetected. Please consider this when interpreting information found in this chart.    Augie Hensley MD, FAAAAI, FACAAI  Allergy, Asthma and Immunology     [facility]

## 2022-07-12 NOTE — LETTER
"    2022         RE: Lucero Ku  [address] [facility] [address]        Dear Colleague,    Thank you for referring your patient, Lucero Ku, to the [facility]. Please see a copy of my visit note below.    SUBJECTIVE:                                                                   Lucero Ku presents today to our Allergy Clinic at [facility]; She is being seen  for a new patient visit.  She is a 36 year old female with concerns for rash. In 2021, Lucero developed a pruritic rash on her lower back and posterior aspect of both extremities after [place]  It was persistent on the same spot for weeks.  Was not coming and going.  Initially, she tried and failed over-the-counter antihistamines, topical steroids, and even prednisone.  After she failed this treatment, in November, she was seen at the [facility] and was treated with Elimite and triamcinolone.  She found Elimite helpful.  Soon after using it, her symptoms resolved.  However, within the past couple of months, she has been having some \"chicken skin \"rashes on her arms that may last for days.  She shows me the pictures.  Nonspecific, erythematous scabs in the elbow region noted.  Also, since 2021, she has been experiencing pruritus in the waistline region every time she takes her jeans off.  She has no such symptoms after wearing loose garments.  She wonders if she may have a latex allergy.  She has no history of urticaria, angioedema, wheezing, chest tightness, shortness of breath with latex exposure.    Has a known issue with metals.  She cannot wear cheap jewelry because it will cause rash.    She was seen in 2017 for eustachian tube dysfunction.  Serum IgE for regional aeroallergen panel was essentially negative at that time.      Patient Active Problem List   Diagnosis     History of 2  sections     PCOS (polycystic " ovarian syndrome)     Hashimoto's thyroiditis     Chronic rhinitis     Dysfunction of Eustachian tube, right     Segmental dysfunction of lumbar region     Segmental dysfunction of sacral region     Segmental dysfunction of thoracic region     Mechanical back pain     Menometrorrhagia     Encounter for surveillance of contraceptive pills       Past Medical History:   Diagnosis Date     Hashimoto's thyroiditis      Infertility, female     in vitro, tiwn birth     PCOS (polycystic ovarian syndrome)       Problem (# of Occurrences) Relation (Name,Age of Onset)    Anxiety Disorder (2) Sister, Brother    Cancer (3) Father (55): Stage IV Kidney (), Paternal Grandmother, Paternal Grandfather    Diabetes (1) Maternal Grandfather    Hypertension (7) Mother, Father, Maternal Grandfather, Paternal Grandmother, Paternal Grandfather, Sister, Brother    Lipids (1) Maternal Grandfather: Defibrilator    Sleep Apnea (1) Sister    Thyroid Disease (3) Father: hypothyroidism, Paternal Grandmother, Paternal Grandfather        Past Surgical History:   Procedure Laterality Date      SECTION  3/4/2014    Procedure:  SECTION;;  Surgeon: Paige Ly MD;  [facility]      SECTION, TUBAL LIGATION, COMBINED  2017    [facility]. left partial salpingectomy;  right salpingectomy including fimbria      ELBOW SURGERY      Right elbow     HC TOOTH EXTRACTION W/FORCEP       HYSTEROSCOPY DIAGNOSTIC  2011     Social History     Socioeconomic History     Marital status:      Spouse name: None     Number of children: 2     Years of education: None     Highest education level: None   Occupational History     Occupation:      Employer: [employer]     Comment: [employer]   Tobacco Use     Smoking status: Never Smoker     Smokeless tobacco: Never Used     Tobacco comment: No smokers in home.   Vaping Use     Vaping Use: Never used   Substance and Sexual Activity     Alcohol use:  Yes     Comment: social     Drug use: No     Sexual activity: Yes     Partners: Male     Birth control/protection: None, Female Surgical   Other Topics Concern     Parent/sibling w/ CABG, MI or angioplasty before 65F 55M? 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  No smokers in the home.  No concerns about domestic violence.  Has an indoor cat.  Aware of toxoplasmosis precautions.              ENVIRONMENTAL HISTORY: [place] The home is heated with a forced air and gas furnace. They do have central air conditioning. The patient's bedroom is furnished with carpeting in bedroom.  Pets inside the house include 0 pets. There is no history of cockroach or mice infestation. There is/are 0 smokers in the house.  The house does not have a damp basement.            Review of Systems   Constitutional: Negative for activity change, chills and fever.   HENT: Negative for congestion, ear discharge, facial swelling, nosebleeds, postnasal drip, rhinorrhea, sinus pressure and sneezing.    Eyes: Negative for discharge, redness and itching.   Respiratory: Negative for cough, chest tightness, shortness of breath and wheezing.    Musculoskeletal: Negative for arthralgias and myalgias.   Skin:        Pruritus of the skin+   Allergic/Immunologic: Negative for environmental allergies.   Hematological: Negative for adenopathy.           Current Outpatient Medications:      doxycycline hyclate (PERIOSTAT) 20 MG tablet, Take 20 mg by mouth 2 times daily, Disp: , Rfl:      fluticasone (FLONASE) 50 MCG/ACT nasal spray, Spray 1 spray into both nostrils daily, Disp: 20 g, Rfl: 1      "norgestim-eth estrad triphasic (ORTHO TRI-CYCLEN LO) 0.18/0.215/0.25 MG-25 MCG tablet, Take 1 tablet by mouth daily, Disp: 84 tablet, Rfl: 3     permethrin (ELIMITE) 5 % external cream, Apply cream from head to toe (except the face); leave on for 8-14 hours then wash off with water; reapply in 1 week if live mites appear., Disp: 60 g, Rfl: 1     triamcinolone (KENALOG) 0.1 % external ointment, Apply topically 2 times daily, Disp: 80 g, Rfl: 1  Immunization History   Administered Date(s) Administered     COVID-19,PF,Pfizer (12+ Yrs) 05/14/2021, 06/04/2021     HPV Quadrivalent 09/05/2007, 11/12/2007, 03/06/2008     Influenza Vaccine IM > 6 months Valent IIV4 (Alfuria,Fluzone) 01/14/2014, 11/03/2014, 09/22/2015, 10/11/2016, 10/16/2017, 09/21/2018, 10/08/2019, 10/16/2020     MMR 08/23/1999, 01/28/2013     TD (ADULT, 7+) 08/23/1999     TDAP Vaccine (Adacel) 01/28/2014, 10/30/2017     Tdap (Adacel,Boostrix) 09/20/2011     Allergies   Allergen Reactions     Hydrocodone-Acetaminophen Nausea and Vomiting     Reaction after taking Vicodin for tooth extraction.  Can take plain Tylenol.     OBJECTIVE:                                                                 /78   Pulse 73   Ht 1.619 m (5' 3.75\")   Wt 90.7 kg (200 lb)   SpO2 100%   BMI 34.60 kg/m          Physical Exam  Vitals and nursing note reviewed.   Constitutional:       General: She is not in acute distress.     Appearance: She is not ill-appearing, toxic-appearing or diaphoretic.   HENT:      Head: Normocephalic and atraumatic.      Right Ear: Tympanic membrane, ear canal and external ear normal.      Left Ear: Tympanic membrane, ear canal and external ear normal.      Nose: No congestion or rhinorrhea.      Right Turbinates: Not enlarged, swollen or pale.      Left Turbinates: Not enlarged, swollen or pale.      Mouth/Throat:      Lips: Pink.      Mouth: Mucous membranes are moist.      Pharynx: Oropharynx is clear. No pharyngeal swelling, " oropharyngeal exudate, posterior oropharyngeal erythema or uvula swelling.   Eyes:      General:         Right eye: No discharge.         Left eye: No discharge.      Conjunctiva/sclera: Conjunctivae normal.   Pulmonary:      Effort: Pulmonary effort is normal. No respiratory distress.      Breath sounds: Normal breath sounds and air entry. No stridor, decreased air movement or transmitted upper airway sounds. No decreased breath sounds, wheezing, rhonchi or rales.   Skin:     General: Skin is warm.      Comments: one 2 mm nonspecific scab on the abdomen noted.  Otherwise, no dermatitis or urticaria.   Neurological:      Mental Status: She is alert and oriented to person, place, and time.   Psychiatric:         Mood and Affect: Mood normal.         Behavior: Behavior normal.             ASSESSMENT/PLAN:    Dermatitis  Itching    For pruritus after wearing jeans, I recommend to switch to loose clothes.  She may try a dress or a skirt.  Has symptoms are not consistent with latex allergy.  Possible contact dermatitis, but interestingly her symptoms initially did not respond to prednisone.  Later, symptoms improved with triamcinolone and Elimite.  The patient is interested in patch testing, but her current dermatologist is out of network.  - Referred to Dr. Brown for comprehensive patch testing.    - Adult Dermatology Referral       Return if symptoms worsen or fail to improve.    Thank you for allowing us to participate in the care of this patient. Please feel free to contact us if there are any questions or concerns about the patient.    Disclaimer: This note consists of symbols derived from keyboarding, dictation and/or voice recognition software. As a result, there may be errors in the script that have gone undetected. Please consider this when interpreting information found in this chart.    Augie Hensley MD, FAAAAI, FACAAI  Allergy, Asthma and Immunology     [facility]  [facility]       Again, thank you for allowing me to participate in the care of your patient.        Sincerely,        Augie Hensley MD

## 2022-07-12 NOTE — PATIENT INSTRUCTIONS
Wear loose garment for the itching in the waistline area.     See. [facility] Dr. Luigi Brown [phone]         Allergy Staff Appt Hours Shot Hours Locations    Physician     Augie Hensley MD       Support Staff     RICK Matthews CMA  Tuesday:   [place] :  [place]: :         [place]:  [place]        Tuesday: : : : :  & Wed:  & Thurs:        Fri:          [facility]  [address] [place] [address]  Appt Line: [phone]    [facility]  [address] [place] [address]  Appt Line: [phone]    Pulmonary Function Scheduling:  [place]: [phone]  [place]: [phone]  [phone]     Prescription Assistance    If you need assistance with your prescriptions (cost, coverage, etc) please contact: [facility] [phone]    Important Scheduling Information    Appointments for skin testing: Appointment will last approximately 45 minutes.  Please call the appointment line for your clinic to schedule.  Discontinue oral antihistamines 7 days prior to the appointment.  Discontinue nasal and ocular antihistamines 4 days prior to appointment.    Appointments for challenges (oral food challenge, penicillin testing, aspirin desensitization) If your provider instructs you to that this additional testing is indicated, it will need to be scheduled directly through the allergy department.  Please contact them via [facility] or by calling the clinic and asking to speak with the allergy team.  They will provide additional information and instructions for the appointment.  Discontinue oral antihistamines 7 days prior to the appointment.  Discontinue nasal and ocular antihistamines 4 days prior to the  appointment.    Thank you for trusting us with your care. Please feel free to contact us with any questions or concerns you may have.

## 2022-08-26 NOTE — TELEPHONE ENCOUNTER
FUTURE VISIT INFORMATION      FUTURE VISIT INFORMATION:    Date: 12.13.22    Time: 7:00    Location: [facility]  REFERRAL INFORMATION:    Referring provider:  Ayden    Referring providers clinic:  Allergy    Reason for visit/diagnosis  Consultation for patch testing, Dermatitis, Recs in Epic, Ref by Augie Hensley MD, Per Pt    RECORDS REQUESTED FROM:       Clinic name Comments Records Status   Allergy 7.12.22  [facility]   Family Med 11.10.21  [facility] 10.25.21  Zoran ORDONEZ

## 2022-12-13 ENCOUNTER — PRE VISIT (OUTPATIENT)
Dept: ALLERGY | Facility: CLINIC | Age: 37
End: 2022-12-13

## 2023-04-22 ENCOUNTER — HEALTH MAINTENANCE LETTER (OUTPATIENT)
Age: 38
End: 2023-04-22

## 2023-05-02 ENCOUNTER — OFFICE VISIT (OUTPATIENT)
Dept: OBGYN | Facility: CLINIC | Age: 38
End: 2023-05-02
Payer: COMMERCIAL

## 2023-05-02 VITALS — BODY MASS INDEX: 36.85 KG/M2 | DIASTOLIC BLOOD PRESSURE: 74 MMHG | WEIGHT: 213 LBS | SYSTOLIC BLOOD PRESSURE: 120 MMHG

## 2023-05-02 DIAGNOSIS — Z30.41 ENCOUNTER FOR SURVEILLANCE OF CONTRACEPTIVE PILLS: ICD-10-CM

## 2023-05-02 DIAGNOSIS — E28.2 PCOS (POLYCYSTIC OVARIAN SYNDROME): ICD-10-CM

## 2023-05-02 DIAGNOSIS — Z00.00 ANNUAL PHYSICAL EXAM: Primary | ICD-10-CM

## 2023-05-02 LAB
ERYTHROCYTE [DISTWIDTH] IN BLOOD BY AUTOMATED COUNT: 12.8 % (ref 10–15)
HBA1C MFR BLD: 5.4 % (ref 0–5.6)
HCT VFR BLD AUTO: 41.3 % (ref 35–47)
HGB BLD-MCNC: 13.5 G/DL (ref 11.7–15.7)
MCH RBC QN AUTO: 27.6 PG (ref 26.5–33)
MCHC RBC AUTO-ENTMCNC: 32.7 G/DL (ref 31.5–36.5)
MCV RBC AUTO: 85 FL (ref 78–100)
PLATELET # BLD AUTO: 236 10E3/UL (ref 150–450)
RBC # BLD AUTO: 4.89 10E6/UL (ref 3.8–5.2)
TSH SERPL DL<=0.005 MIU/L-ACNC: 2.99 MU/L (ref 0.4–4)
WBC # BLD AUTO: 6.3 10E3/UL (ref 4–11)

## 2023-05-02 PROCEDURE — 36415 COLL VENOUS BLD VENIPUNCTURE: CPT | Performed by: OBSTETRICS & GYNECOLOGY

## 2023-05-02 PROCEDURE — 84443 ASSAY THYROID STIM HORMONE: CPT | Performed by: OBSTETRICS & GYNECOLOGY

## 2023-05-02 PROCEDURE — 99395 PREV VISIT EST AGE 18-39: CPT | Performed by: OBSTETRICS & GYNECOLOGY

## 2023-05-02 PROCEDURE — 83036 HEMOGLOBIN GLYCOSYLATED A1C: CPT | Performed by: OBSTETRICS & GYNECOLOGY

## 2023-05-02 PROCEDURE — 85027 COMPLETE CBC AUTOMATED: CPT | Performed by: OBSTETRICS & GYNECOLOGY

## 2023-05-02 RX ORDER — MINOCYCLINE HYDROCHLORIDE 100 MG/1
100 CAPSULE ORAL 2 TIMES DAILY
COMMUNITY
Start: 2023-03-24

## 2023-05-02 RX ORDER — NORGESTIMATE AND ETHINYL ESTRADIOL 7DAYSX3 LO
1 KIT ORAL DAILY
Qty: 84 TABLET | Refills: 3 | Status: SHIPPED | OUTPATIENT
Start: 2023-05-02

## 2023-05-02 NOTE — PROGRESS NOTES
Subjective  Lucero Ku is a 37 year old female who presents for her annual exam.  Patient states her menses are regular, monthly.  She has started to develop headaches a few days after her menses ends.  She has tried to skip a menses but then developed significant bleeding.  This is themost regular her menses has been in years.  Patient was just switched to a new acne medication.  She feels it is helping. Patient uses both tampons and pads and changes them every few hours.  Some clots.  No dizziness or lightheadedness.  No problems urinating.  Irregular bowel movements which is nothing new for her.  Patient has never seen GI.  She is ok with this.  Patient is sexually active.  No dyspareunia.  No vaginal spotting after.  2 c sections.  2 sets of twins.      Most recent pap:    History of abnormal Pap smear:  No  History of STI's:  No  History of PID:   No    Family history of uterine cancer:  No  Family history of ovarian cancer: No  Family history of colon cancer:  No  Family history of breast cancer:  No    ROS  ROS: 10 point ROS neg other than the symptoms noted above in the HPI.    Past Medical History:   Diagnosis Date     Hashimoto's thyroiditis      Infertility, female     in vitro, tiwn birth     PCOS (polycystic ovarian syndrome)      Past Surgical History:   Procedure Laterality Date      SECTION  3/4/2014    Procedure:  SECTION;;  Surgeon: Paige Ly MD;  Location: [facility] L+D      SECTION, TUBAL LIGATION, COMBINED  2017    [facility]. left partial salpingectomy;  right salpingectomy including fimbria      ELBOW SURGERY      Right elbow     HC TOOTH EXTRACTION W/FORCEP       HYSTEROSCOPY DIAGNOSTIC  2011     Family History   Problem Relation Age of Onset     Hypertension Mother      Hypertension Father      Cancer Father 55        Stage IV Kidney ()     Thyroid Disease Father         hypothyroidism     Diabetes Maternal Grandfather       Hypertension Maternal Grandfather      Lipids Maternal Grandfather         Defibrilator     Cancer Paternal Grandmother      Thyroid Disease Paternal Grandmother      Hypertension Paternal Grandmother      Cancer Paternal Grandfather      Thyroid Disease Paternal Grandfather      Hypertension Paternal Grandfather      Hypertension Sister      Sleep Apnea Sister      Anxiety Disorder Sister      Hypertension Brother      Anxiety Disorder Brother      Social History     Tobacco Use     Smoking status: Never     Smokeless tobacco: Never     Tobacco comments:     No smokers in home.   Vaping Use     Vaping status: Never Used   Substance Use Topics     Alcohol use: Yes     Comment: social       Tobacco abuse:  No  Do you get at least three servings of calcium containing foods daily (dairy, green leafy vegetables, etc.)? yes   Outside of work or daily activities, how many days per week do you exercise for 30 minutes or longer? 3-4x per week  The patient does not drink >3 drinks per day nor >7 drinks per week.   Have you had an eye exam in the past two years? yes   Do you see a dentist twice per year? yes   Today's PHQ-2 Score:   Abuse: Current or Past(Physical, Sexual or Emotional)- No   Do you feel safe in your environment - Yes  Objective  Vitals: /74 (BP Location: Right arm, Cuff Size: Adult Regular)   Wt 96.6 kg (213 lb)   LMP 04/26/2023   BMI 36.85 kg/m    BMI= Body mass index is 36.85 kg/m .    General appearance=well developed, well-nourished female  Gait=normal  Psych=mood is stable, alert and oriented x3  HEENT=mucous membranes moist  Skin=no rashes or lesions seen,normal turgor   Breast:  Benign exam.  No masses noted.  Non tender. No skin changes, retraction, or nipple discharge.  Axilla feel completely normal, no lymph node enlargement and non-tender.  Neck=overall appearance is normal  Heart=RRR, no murmurs, no swelling noted  Thyroid=normal, no masses, no TTP, no enlargement  Lungs=non-labored  breathing, no use of accessory muscles, clear to ausculation bilaterally  Abd=soft, Nontender/nondistended, +bowel sounds x4, no masses, no signs of hernias, no evidence of hepatosplenomegaly  PELVIC:    External genitalia: normal without lesions or masses  Urethral meatus: no lesions or prolapse noted, normal size  Urethra: no masses, non tender  Bladder: non tender, no fullness  Vagina: normal mucosa and rugae, no discharge.  Cervix: normal without lesion, no cervical motion tenderness, healthy, nulliparous  Uterus: small, mobile, nontender.  Adnexa: non tender, without masses  Rectal: deferred  Ext=no clubbing or cyanosis, no swelling      Last lipid profile: 2022  Regular self breast exam:  Yes  Most recent mammogram:  N/A  History of abnormal mammogram:  N/A  Fit testing:  N/A  DEXA:  N/A        Assessment/Plan  1.)  Annual/well woman exam=TSH, CBC, HgA1C  2.)  Birth control=ocp refilled  3.)  Acne=continue follow up with derm  4.)  Menstrual migraines  5.)  PCOS      The following topics were discussed or recommended   Discussed seat belt, helmet and sunscreen use  Vision screening   Calcium/Vitamin D supplement=Recommended 1000 mg of calcium daily and 800 IU of vitamin D.  Contraception     30 minutes were spent on the date of the encounter doing chart review, history and exam, documentation, and further activities as noted above.        Chelo Mart DO

## 2023-05-02 NOTE — PATIENT INSTRUCTIONS
PREVENTIVE HEALTH RECOMMENDATIONS:   Get a physical every year.  A pap test is important to have done starting at age 21 and then every three years as long as your pap is normal.  When you receive the results of your pap test it will include when you need to have your next pap test.    You should be tested each year for chlamydia and gonorrhea if you are aged 16-25 and if you have had a new sexual partner since you were last tested.   Vaccines: Get a flu shot each year.   Eat at least 8-10 servings of fruits and vegetables daily.  Eat whole-grain bread and cereal, whole-wheat pasta and brown rice instead of white grains and white rice.   For bone health: Eat calcium-rich foods (dairy products) or take calcium pills (500 to 600 mg with vitamin D) twice a day with food.   Exercise for an average of 30 minutes a day, 5 days of the week. It can be as simple as taking a brisk walk.  This will help you control your weight and prevent many diseases.   Limit alcohol to one drink per day.   Don't smoke and limit your exposure to second hand smoke.  If you smoke consider making a plan to quit. Go to [employer] and clink on   [employer]  for help   Wear sunscreen with at least SPF15 to prevent skin damage and skin cancer.   Brush your teeth twice a day and floss once a day and see your dentist twice a year for an exam and cleaning.   Have a great year and I will look forward to seeing you next year.   Chelo Mart DO    If you have labs or imaging done, the results will automatically release in [facility] without an interpretation.  Your health care professional will review those results and send an interpretation with recommendations as soon as possible, but this may be 1-3 business days.    If you have any questions regarding your visit, please contact your care team.     [employer]: [phone]  Women s Health CLINIC HOURS TELEPHONE NUMBER       DO Luna Hallman   CARLA Castro-RICK Alonos-RICK Motta-RICK Lindo-  [place]-     Monday- [place]  8:00 a.m - 5:00 p.m    Tuesday- [place]  8:00 a.m - 5:00 p.m    Wednesday- OFF    Thursday- Surgery     Friday- [place]  8:00 a.m - 5:00 p.m. [facility]  [address]  [place] [address]  [phone] Fax  Imaging [phone]    [facility]  [address] [place] [address] [place] [address]  [phone]    [address] [place] [address]  [phone] Fax  Imaging [phone]     Urgent Care locations:  [facility] Monday-Friday  10 am - 8 pm  Saturday and Sunday   9 am - 5 pm  Monday-Friday   10 am - 8 pm  Saturday and Sunday   9 am - 5 pm   [phone]     If you need a medication refill, please contact your pharmacy. Please allow 3 business days for your refill to be completed.    As always, thank you for trusting us with your healthcare needs!

## 2023-11-22 NOTE — ADDENDUM NOTE
Addended by: VIRGIE STORY on: 6/2/2017 08:17 AM     Modules accepted: Orders     Subjective:      Braulio Gonzalez is a 62 y.o. female. Here for Gynecologic Exam (Postmenopausal /Pap 7/26/21 -/- Merleen Flattery 11/25/22 bi-rads 1/ scheduled/Dxa 11/25/22 osteoporosis/Colonoscopy 10/9/2020 )    GYN HPI  Menstrual cycle:  Patient is menopausal.  She denies hot flashes and vaginal dryness denies PMB  Vaginal c/o: dyrness- managed with Estrace-not regularly  Urinary c/o: deneies- hx of bldr sling surgery. Is on Ditropan for OAB. Is starting to take longer to initiate urine flow. Denies dysuria. - feels like she empties out when she goes  Breast complaints:denies  She does not do self breast Exams    Sexually active: yes, , monogamous  Risk for STI's low- declines testing  Contraception: N/A- S/P Menopasue  She reports she feels safe at home. Dietary calcium/vit D  intake: gets from diet and supplements  Lifestyle: less than previous- has arthitis ob/l knees and goes on treadmill 3 times per week    Hereditary Cancer Screening  Cancer-related family history is not on file. Substance Abuse Screening Completed. See hx and flowsheet. The following portions of the patient's history were reviewed and updated as appropriate: allergies, current medications, past family history, past medical history, past social history, past surgical history, and problem list.       Review of Systems   Constitutional:  Negative for appetite change, chills, fatigue, fever and unexpected weight change. HENT: Negative. Eyes: Negative. Respiratory:  Negative for chest tightness and shortness of breath. Cardiovascular:  Negative for chest pain and palpitations. Gastrointestinal:  Negative for abdominal pain, constipation and vomiting. Endocrine: Negative for cold intolerance and heat intolerance. Genitourinary:         As per HPI   Musculoskeletal:  Negative for back pain, joint swelling and neck pain. Skin:  Negative for color change and rash.    Neurological:  Negative for dizziness, weakness and numbness. Hematological:  Does not bruise/bleed easily. Psychiatric/Behavioral: Negative. Objective:  /60 (BP Location: Right arm, Patient Position: Sitting, Cuff Size: Standard)   Ht 5' 1" (1.549 m)   Wt 53.5 kg (118 lb)   BMI 22.30 kg/m²        Physical Exam  Constitutional:       General: She is not in acute distress. Appearance: Normal appearance. Genitourinary:      Vulva and rectum normal.      No lesions in the vagina. Right Labia: No rash or lesions. Left Labia: No lesions or rash. No vaginal discharge, erythema, tenderness or bleeding. Right Adnexa: not tender and no mass present. Left Adnexa: not tender and no mass present. No cervical motion tenderness, discharge or friability. Uterus is not enlarged or tender. No urethral prolapse present. Pelvic exam was performed with patient in the lithotomy position. Breasts:     Breasts are symmetrical.      Right: Breast implant present. No inverted nipple, mass, nipple discharge, skin change or tenderness. Left: Breast implant present. No inverted nipple, mass, nipple discharge, skin change or tenderness. HENT:      Head: Normocephalic and atraumatic. Cardiovascular:      Rate and Rhythm: Normal rate. Heart sounds: No murmur heard. Pulmonary:      Effort: Pulmonary effort is normal.      Breath sounds: Normal breath sounds. Abdominal:      General: There is no distension. Palpations: Abdomen is soft. Tenderness: There is no abdominal tenderness. Musculoskeletal:         General: Normal range of motion. Cervical back: Normal range of motion. Lymphadenopathy:      Cervical: No cervical adenopathy. Neurological:      Mental Status: She is alert and oriented to person, place, and time. Skin:     General: Skin is warm and dry. Psychiatric:         Mood and Affect: Mood normal.         Behavior: Behavior normal.   Vitals reviewed. Assessment/Plan:           [address] [place] [address]- [facility]  Annual GYN examination completed today. Health maintenance reviewed/updated as appropriate    HEALTH MAINTENANCE SCREENINGS:    Last Papanicolaou test:  07/26/2021   History of abnormal pap: No,   Last mammogram:  11/25/2022  Last Colon Cancer Screening:  10/09/2020 Not on file. Recall 10 y  Last Bone Density scan: 11/22- osteoporosis. Is on prolia injections- managed by PCP    Cervical cancer screen :Previous pap smears and ASCCP screening guidelines have been reviewed. Pap not indicated today. Encouraged regular self breast exams  Risk prevention and anticipatory guidance provided including:  Age related Calcium and vitamin D intake  Dietary and lifestyle recommendations based on her age and weight. body mass index is 22.3 kg/m². .    Tobacco and alcohol use, intervention ordered if applicable. Condom use for prevention of STI's. Declined STI Screening. Contraception:  N/A- post menopause    Problem List Items Addressed This Visit       Vaginal dryness, menopausal    Relevant Medications    estradiol (ESTRACE) 0.1 mg/g vaginal cream (Start on 11/23/2023)     Other Visit Diagnoses       Encounter for annual routine gynecological examination    -  Primary            No orders of the defined types were placed in this encounter.

## 2023-12-08 ENCOUNTER — VIRTUAL VISIT (OUTPATIENT)
Dept: FAMILY MEDICINE | Facility: CLINIC | Age: 38
End: 2023-12-08
Payer: COMMERCIAL

## 2023-12-08 DIAGNOSIS — T36.95XA ANTIBIOTIC-INDUCED YEAST INFECTION: ICD-10-CM

## 2023-12-08 DIAGNOSIS — B37.9 ANTIBIOTIC-INDUCED YEAST INFECTION: ICD-10-CM

## 2023-12-08 DIAGNOSIS — J01.90 ACUTE SINUSITIS WITH SYMPTOMS > 10 DAYS: Primary | ICD-10-CM

## 2023-12-08 PROCEDURE — 99214 OFFICE O/P EST MOD 30 MIN: CPT | Mod: VID | Performed by: FAMILY MEDICINE

## 2023-12-08 RX ORDER — FLUCONAZOLE 150 MG/1
150 TABLET ORAL ONCE
Qty: 1 TABLET | Refills: 0 | Status: SHIPPED | OUTPATIENT
Start: 2023-12-08 | End: 2023-12-08

## 2023-12-08 NOTE — PROGRESS NOTES
Lucero is a 38 year old who is being evaluated via a billable video visit.      How would you like to obtain your AVS? MyChart  If the video visit is dropped, the invitation should be resent by: Text to cell phone: [phone]  Will anyone else be joining your video visit? No        Assessment & Plan     Acute sinusitis with symptoms > 10 days  Differentials discussed in detail and Augmentin prescribed for suspected acute sinusitis considering chronicity of symptoms.  Recommended well hydration, warm fluids, steam inhalation, humidifier use and to follow-up if symptoms persist or worsen  - amoxicillin-clavulanate (AUGMENTIN) 875-125 MG tablet; Take 1 tablet by mouth 2 times daily for 10 days    Antibiotic-induced yeast infection  Usually gets vaginal yeast infection with antibiotic use.  Diflucan prescribed  - fluconazole (DIFLUCAN) 150 MG tablet; Take 1 tablet (150 mg) by mouth once for 1 dose      Robert Willard MD  [facility]      Subjective   Lucero is a 38 year old, presenting for the following health issues:  URI      History of Present Illness       Reason for visit:  Cold  Symptom onset:  1-2 weeks ago  Symptoms include:  Congested and cough  Symptom intensity:  Moderate  Symptom progression:  Staying the same  Had these symptoms before:  Yes  Has tried/received treatment for these symptoms:  Yes  Previous treatment was successful:  Yes  Prior treatment description:  I don t remember what the medication was.  What makes it worse:  Not drinking water or talking to much  What makes it better:  Cough drops. DayQuil helps a little.    She eats 2-3 servings of fruits and vegetables daily.She consumes 0 sweetened beverage(s) daily.She exercises with enough effort to increase her heart rate 20 to 29 minutes per day.  She exercises with enough effort to increase her heart rate 3 or less days per week.   She is taking medications regularly.       Review of Systems   Constitutional,  HEENT, cardiovascular, pulmonary, gi and gu systems are negative, except as otherwise noted.      Objective         Vitals:  No vitals were obtained today due to virtual visit.    Physical Exam   GENERAL: Healthy, alert and no distress  EYES: Eyes grossly normal to inspection.  No discharge or erythema, or obvious scleral/conjunctival abnormalities.  RESP: No audible wheeze, cough, or visible cyanosis.  No visible retractions or increased work of breathing.    SKIN: Visible skin clear. No significant rash, abnormal pigmentation or lesions.  NEURO: Cranial nerves grossly intact.  Mentation and speech appropriate for age.  PSYCH: Mentation appears normal, affect normal/bright, judgement and insight intact, normal speech and appearance well-groomed.      Video-Visit Details    Type of service:  Video Visit     Originating Location (pt. Location): Home    Distant Location (provider location):  On-site  Platform used for Video Visit: Nick

## 2024-06-29 ENCOUNTER — HEALTH MAINTENANCE LETTER (OUTPATIENT)
Age: 39
End: 2024-06-29

## 2024-11-22 PROBLEM — Z30.41 ENCOUNTER FOR SURVEILLANCE OF CONTRACEPTIVE PILLS: Status: RESOLVED | Noted: 2022-02-25 | Resolved: 2024-11-22

## 2025-01-18 NOTE — PROGRESS NOTES
Presents for routine  appointment.     No complaints.  Difficulty working because of discomfort.  Carpal tunnel  No LOF/VB/Ctxs.    ROS:   and GI  negative.     Please see Prenatal Vitals and Notes Flowsheet for objective data.  US at [facility] 11/10/17:      Impression  =========    1) Diamniotic dichorionic twin pregnancy at 32 5/7 weeks gestational age.  2) Mild unilateral (right) pyelectasis (UTD A1) is seen in both twins.  3) None of the other anomalies commonly detected by ultrasound were evident in the detailed fetal anatomic survey described above in either twin.  4) Growth parameters and estimated fetal weight were consistent with an appropriate for gestation age pattern of growth in both twins. The intertwin discordance is within  normal limits.  5) The amniotic fluid volume appeared normal around both twins.  6) The BPP is reassuring.    Recommendation  ==============    We discussed the findings on today's ultrasound with the patient.    Continue  surveillance with weekly BPP at this time and a final growth US has been scheduled here in 4 weeks. Given mild right pyelectasis in both twins, an   consultation with Peds Urology will be coordinated as well.    Return to primary provider for continued prenatal care.    Thank-you for the opportunity to participate in the care of this patient. If you have questions regarding today's evaluation or if we can be of further service, please contact the  [facility].    **Fetal anomalies may be present but not detected**.    REPORT SIGNED BY: GUMARO ALFORD MD    A/P:  32 year old  at 33w0d       ICD-10-CM    1. Dichorionic diamniotic twin pregnancy in third trimester O30.043    2. History of  delivery affecting pregnancy O34.219        Peds urology  - they are planning to call her  Continue weekly BPP [facility].    Tdap given at previous visit  Discussed timing of delivery  Uncomplicated Di/Di twins should be  A (CATHETER OD6 FR L100 CM LG LUM SH 2 BRAID SOFT TIP RADL TIG) catheter was inserted. delivered at 38 weeks.  She is requesting 37 weeks.  Delivery may be considered if certain conditions arise.    Group B Strep next visit.   Preop next visit.    Follow up in 2 weeks.      Alee Duncan MD

## 2025-03-07 ENCOUNTER — TELEPHONE (OUTPATIENT)
Dept: OTHER | Facility: CLINIC | Age: 40
End: 2025-03-07
Payer: COMMERCIAL

## 2025-03-07 NOTE — TELEPHONE ENCOUNTER
[facility]    Who is the name of the provider?:  NONE   What is the location you see this provider at/preferred location?: [place]  Person calling / Facility: Lucero Ku  Phone number:  [phone] (home)   Nurse call back needed:  Yes or route to scheduling.     Reason for call:  Self referral for left ankle swelling. Ankle has been swollen all week but swelling comes and goes for last six months, swelling is not going down this week. No previous imaging. Patient's sister, who does vascular ultrasounds, recommended she see a vascular provider.    Pharmacy location: n/a  Outside Imaging: n/a   Can we leave a detailed message on this number?  YES     3/7/2025, 4:25 PM

## 2025-03-10 NOTE — TELEPHONE ENCOUNTER
Left voicemail for patient to call back to schedule appointment(s), provided telephone number for patient to call back to schedule.      Kari Vaughn MA

## 2025-03-10 NOTE — TELEPHONE ENCOUNTER
Previous imaging completed (pertinent to referral):  None    Routing to scheduling to coordinate the following:  NEW VASCULAR PATIENT consult with Vascular Medicine  Please schedule this at next available    Appt note:  Self referral for left leg swelling for approximately last 6 months.    JORGITO MoranN, RN, CV-BC, CNOR  [facility] [place]

## 2025-03-24 ENCOUNTER — OFFICE VISIT (OUTPATIENT)
Dept: OTHER | Facility: CLINIC | Age: 40
End: 2025-03-24
Attending: INTERNAL MEDICINE
Payer: COMMERCIAL

## 2025-03-24 VITALS
DIASTOLIC BLOOD PRESSURE: 74 MMHG | WEIGHT: 182 LBS | HEART RATE: 92 BPM | BODY MASS INDEX: 31.49 KG/M2 | OXYGEN SATURATION: 100 % | SYSTOLIC BLOOD PRESSURE: 133 MMHG

## 2025-03-24 DIAGNOSIS — I87.2 VENOUS (PERIPHERAL) INSUFFICIENCY: Primary | ICD-10-CM

## 2025-03-24 PROCEDURE — 99205 OFFICE O/P NEW HI 60 MIN: CPT | Performed by: INTERNAL MEDICINE

## 2025-03-24 PROCEDURE — 99213 OFFICE O/P EST LOW 20 MIN: CPT | Performed by: INTERNAL MEDICINE

## 2025-03-24 PROCEDURE — 3078F DIAST BP <80 MM HG: CPT | Performed by: INTERNAL MEDICINE

## 2025-03-24 PROCEDURE — 3074F SYST BP LT 130 MM HG: CPT | Performed by: INTERNAL MEDICINE

## 2025-03-24 PROCEDURE — G2211 COMPLEX E/M VISIT ADD ON: HCPCS | Performed by: INTERNAL MEDICINE

## 2025-03-24 NOTE — PROGRESS NOTES
INITIAL VASCULAR MEDICAL ASSESSMENT  REFERRAL SOURCE: Self  REASON FOR CONSULT: LLE swelling        A/P:      (I87.2) Venous (peripheral) insufficiency  (primary encounter diagnosis)  Comment: Wear waist hig compression hosiery as much as possible given her vocation as a .  Plan: RTC six months with preclinic venous comp study (nursing staff to please order under my name)  if not satisfied. Also consider ordering a CTV abd/pelvis a that time if venous comp does not seem to anatomically explain her sxs.     71 minutes total medical care on today's date.      The longitudinal care of plan for Lucero was addressed during this visit. Due to added complexity of care, we will continue to support Lucero Ku  and the subsequent management of these conditions and with ongoing continuity of care for these conditions.       HPI: Lucero Ku is a 39 year old female with a h/o two twin pregnancies, no h/o VTE, h/o PCOS who works as a  standing for prolonged periods. She notes LLE swelling for the last several years which is worse at the end of the day and the end of the week. She wears compression hosiery and notes improvement in sxs with this. The patient presents today to address the above    Review Of Systems  Skin: negative  Eyes: negative  Ears/Nose/Throat: negative  Respiratory: No shortness of breath, dyspnea on exertion, cough, or hemoptysis  Cardiovascular: negative  Gastrointestinal: negative  Genitourinary: negative  Musculoskeletal: as above  Neurologic: negative  Psychiatric: negative  Hematologic/Lymphatic/Immunologic: negative  Endocrine: negative      PAST MEDICAL HISTORY:                  Past Medical History:   Diagnosis Date    Hashimoto's thyroiditis     Infertility, female     in vitro, tiwn birth    PCOS (polycystic ovarian syndrome)        PAST SURGICAL HISTORY:                  Past Surgical History:   Procedure Laterality Date     SECTION  3/4/2014     Procedure:  SECTION;;  Surgeon: Paige Ly MD;  Location: [facility] L[facility]     SECTION, TUBAL LIGATION, COMBINED  2017    [facility]. left partial salpingectomy;  right salpingectomy including fimbria     ELBOW SURGERY      Right elbow    HC TOOTH EXTRACTION W/FORCEP      HYSTEROSCOPY DIAGNOSTIC  2011       CURRENT MEDICATIONS:                  Current Outpatient Medications   Medication Sig Dispense Refill    doxycycline hyclate (PERIOSTAT) 20 MG tablet Take 20 mg by mouth 2 times daily.      fluticasone (FLONASE) 50 MCG/ACT nasal spray Spray 1 spray into both nostrils daily 20 g 1    minocycline (MINOCIN) 100 MG capsule Take 100 mg by mouth 2 times daily      norgestim-eth estrad triphasic (ORTHO TRI-CYCLEN LO) 0.18/0.215/0.25 MG-25 MCG tablet Take 1 tablet by mouth daily (Patient not taking: Reported on 2024) 84 tablet 3    permethrin (ELIMITE) 5 % external cream Apply cream from head to toe (except the face); leave on for 8-14 hours then wash off with water; reapply in 1 week if live mites appear. 60 g 1    triamcinolone (KENALOG) 0.1 % external ointment Apply topically 2 times daily 80 g 1       ALLERGIES:                  Allergies   Allergen Reactions    Hydrocodone-Acetaminophen Nausea and Vomiting     Reaction after taking Vicodin for tooth extraction.  Can take plain Tylenol.       SOCIAL HISTORY:                  Social History     Socioeconomic History    Marital status:      Spouse name: Not on file    Number of children: 2    Years of education: Not on file    Highest education level: Not on file   Occupational History    Occupation:      Employer: [employer]     Comment: [employer]   Tobacco Use    Smoking status: Never     Passive exposure: Never    Smokeless tobacco: Never    Tobacco comments:     No smokers in home.   Vaping Use    Vaping status: Never Used   Substance and Sexual Activity    Alcohol use: Yes     Comment: social    Drug  use: No    Sexual activity: Yes     Partners: Male     Birth control/protection: None, Female Surgical   Other Topics Concern    Parent/sibling w/ CABG, MI or angioplasty before 65F 55M? 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  No smokers in the home.  No concerns about domestic violence.  Has an indoor cat.  Aware of toxoplasmosis precautions.              ENVIRONMENTAL HISTORY: [place] The home is heated with a forced air and gas furnace. They do have central air conditioning. The patient's bedroom is furnished with carpeting in bedroom.  Pets inside the house include 0 pets. There is no history of cockroach or mice infestation. There is/are 0 smokers in the house.  The house does not have a damp basement.      Social Drivers of Health     Financial Resource Strain: Low Risk  (12/8/2023)    Financial Resource Strain     Within the past 12 months, have you or your family members you live with been unable to get utilities (heat, electricity) when it was really needed?: No   Food Insecurity: Low Risk  (12/8/2023)    Food Insecurity     Within the past 12 months, did you worry that your food would run out before you got money to buy more?: No     Within the past 12 months, did the food you bought just not last and you didn t have money to get more?: No   Transportation Needs: Low Risk  (12/8/2023)    Transportation Needs     Within the past 12 months, has lack of transportation kept you from medical appointments, getting your medicines, non-medical meetings or appointments, work, or from getting things that you need?: No    Physical Activity: Not on file   Stress: Not on file   Social Connections: Not on file   Interpersonal Safety: Not on file   Housing Stability: Low Risk  (12/8/2023)    Housing Stability     Do you have housing? : Yes     Are you worried about losing your housing?: No       FAMILY HISTORY:                   Family History   Problem Relation Age of Onset    Hypertension Mother     Hypertension Father     Cancer Father 55        Stage IV Kidney (9/13)    Thyroid Disease Father         hypothyroidism    Diabetes Maternal Grandfather     Hypertension Maternal Grandfather     Lipids Maternal Grandfather         Defibrilator    Cancer Paternal Grandmother     Thyroid Disease Paternal Grandmother     Hypertension Paternal Grandmother     Cancer Paternal Grandfather     Thyroid Disease Paternal Grandfather     Hypertension Paternal Grandfather     Hypertension Sister     Sleep Apnea Sister     Anxiety Disorder Sister     Hypertension Brother     Anxiety Disorder Brother          Physical exam Reveals:    O/P: WNL  HEENT: WNL  NECK: No JVD, thyromegaly, or lymphadenopathy  HEART: RRR, no murmurs, gallops, or rubs  LUNGS: CTA bilaterally without rales, wheezes, or rhonchi  GI: NABS, nondistended, nontender, soft  EXT:without cyanosis, clubbing, two plus LLE edema, LLE is 20 % larger than RLE, no obvious SQ varicosities  NEURO: nonfocal  : no flank tenderness         All relevant labs and imaging reviewed by myself on today's date.            The longitudinal care of plan for Amys addressed during this visit. Due to added complexity of care, we will continue to support Lucero Ku  and the subsequent management of these conditions and with ongoing continuity of care for these conditions.

## 2025-03-24 NOTE — PROGRESS NOTES
[facility]        Patient is here for a consult to discuss leg swelling    Pt is currently taking no meds that would impact our treatment plan.    /70 (BP Location: Right arm, Patient Position: Sitting, Cuff Size: Adult Regular)   Pulse 92   Wt 182 lb (82.6 kg)   SpO2 100%   BMI 31.49 kg/m      The provider has been notified that the patient has no concerns.     Questions patient would like addressed today are: N/A.    Refills are needed: N/A    Has homecare services and agency name:  Tiffanie Vaughn MA

## 2025-03-24 NOTE — LETTER
[facility] [address]  [place] [address]  Phone: [phone]  Fax: [phone]      March 24, 2025      Lucero Ku  [address] [facility] [address]          Dear Lucero Ku,    Please be advised that due to medical vascular needs, you are medically required to wear tennis shoes while at work.      Sincerely,      Sergio Jeffery MD    [facility] [address]  [place] [address]  Phone: [phone]  Fax: [phone]

## 2025-06-12 ENCOUNTER — ORDERS ONLY (AUTO-RELEASED) (OUTPATIENT)
Dept: FAMILY MEDICINE | Facility: OTHER | Age: 40
End: 2025-06-12

## 2025-06-12 ENCOUNTER — RESULTS FOLLOW-UP (OUTPATIENT)
Dept: FAMILY MEDICINE | Facility: OTHER | Age: 40
End: 2025-06-12

## 2025-06-12 ENCOUNTER — OFFICE VISIT (OUTPATIENT)
Dept: FAMILY MEDICINE | Facility: OTHER | Age: 40
End: 2025-06-12
Payer: COMMERCIAL

## 2025-06-12 ENCOUNTER — NURSE TRIAGE (OUTPATIENT)
Dept: NURSING | Facility: CLINIC | Age: 40
End: 2025-06-12
Payer: COMMERCIAL

## 2025-06-12 ENCOUNTER — NURSE TRIAGE (OUTPATIENT)
Dept: NURSING | Facility: CLINIC | Age: 40
End: 2025-06-12

## 2025-06-12 VITALS
SYSTOLIC BLOOD PRESSURE: 128 MMHG | WEIGHT: 186.5 LBS | DIASTOLIC BLOOD PRESSURE: 68 MMHG | HEIGHT: 65 IN | HEART RATE: 106 BPM | OXYGEN SATURATION: 98 % | RESPIRATION RATE: 16 BRPM | TEMPERATURE: 97.6 F | BODY MASS INDEX: 31.07 KG/M2

## 2025-06-12 DIAGNOSIS — R55 SYNCOPE, UNSPECIFIED SYNCOPE TYPE: ICD-10-CM

## 2025-06-12 DIAGNOSIS — R55 SYNCOPE, UNSPECIFIED SYNCOPE TYPE: Primary | ICD-10-CM

## 2025-06-12 LAB
ALBUMIN SERPL BCG-MCNC: 4.4 G/DL (ref 3.5–5.2)
ALP SERPL-CCNC: 71 U/L (ref 40–150)
ALT SERPL W P-5'-P-CCNC: 25 U/L (ref 0–50)
ANION GAP SERPL CALCULATED.3IONS-SCNC: 11 MMOL/L (ref 7–15)
AST SERPL W P-5'-P-CCNC: 22 U/L (ref 0–45)
BASOPHILS # BLD AUTO: 0 10E3/UL (ref 0–0.2)
BASOPHILS NFR BLD AUTO: 0 %
BILIRUB SERPL-MCNC: 0.5 MG/DL
BUN SERPL-MCNC: 10.9 MG/DL (ref 6–20)
CALCIUM SERPL-MCNC: 9.6 MG/DL (ref 8.8–10.4)
CHLORIDE SERPL-SCNC: 102 MMOL/L (ref 98–107)
CREAT SERPL-MCNC: 0.69 MG/DL (ref 0.51–0.95)
EGFRCR SERPLBLD CKD-EPI 2021: >90 ML/MIN/1.73M2
EOSINOPHIL # BLD AUTO: 0.1 10E3/UL (ref 0–0.7)
EOSINOPHIL NFR BLD AUTO: 1 %
ERYTHROCYTE [DISTWIDTH] IN BLOOD BY AUTOMATED COUNT: 12.9 % (ref 10–15)
EST. AVERAGE GLUCOSE BLD GHB EST-MCNC: 103 MG/DL
GLUCOSE SERPL-MCNC: 103 MG/DL (ref 70–99)
HBA1C MFR BLD: 5.2 % (ref 0–5.6)
HCO3 SERPL-SCNC: 26 MMOL/L (ref 22–29)
HCT VFR BLD AUTO: 43.3 % (ref 35–47)
HGB BLD-MCNC: 14.2 G/DL (ref 11.7–15.7)
IMM GRANULOCYTES # BLD: 0.1 10E3/UL
IMM GRANULOCYTES NFR BLD: 1 %
LYMPHOCYTES # BLD AUTO: 1.7 10E3/UL (ref 0.8–5.3)
LYMPHOCYTES NFR BLD AUTO: 20 %
MAGNESIUM SERPL-MCNC: 2.1 MG/DL (ref 1.7–2.3)
MCH RBC QN AUTO: 28.3 PG (ref 26.5–33)
MCHC RBC AUTO-ENTMCNC: 32.8 G/DL (ref 31.5–36.5)
MCV RBC AUTO: 86 FL (ref 78–100)
MONOCYTES # BLD AUTO: 0.4 10E3/UL (ref 0–1.3)
MONOCYTES NFR BLD AUTO: 4 %
NEUTROPHILS # BLD AUTO: 6.1 10E3/UL (ref 1.6–8.3)
NEUTROPHILS NFR BLD AUTO: 74 %
PLATELET # BLD AUTO: 213 10E3/UL (ref 150–450)
POTASSIUM SERPL-SCNC: 4.7 MMOL/L (ref 3.4–5.3)
PROT SERPL-MCNC: 7.1 G/DL (ref 6.4–8.3)
RBC # BLD AUTO: 5.01 10E6/UL (ref 3.8–5.2)
SODIUM SERPL-SCNC: 139 MMOL/L (ref 135–145)
TSH SERPL DL<=0.005 MIU/L-ACNC: 2.44 UIU/ML (ref 0.3–4.2)
WBC # BLD AUTO: 8.3 10E3/UL (ref 4–11)

## 2025-06-12 NOTE — TELEPHONE ENCOUNTER
"Triage call   calling consent to communicate on file.  This morning at 2 am he heard a  thud  wheezing  he went into the bathroom and she was agonal  breathing  she stopped breathing  and was unconscious.  He dragged her out of the bathroom and was going to start cpr when she woke up.  She denied feeling dizzy she said she felt really hoot before she passed out.  She was sitting on the toilet  when it happened and did not hurt herself.  She says this has happened one time before although the  doesn't remember  it and says he was not there for that.    Per protocol see HCP with in 4 hours or PCP triage.Care advice given.  Verbalizes understanding and agrees with plan. Transferred to scheduling.    Jihan Wilburn RN   [facility] Nurse Advisor  6:53 AM 6/12/2025    Reason for Disposition   [1] All other patients AND [2] now alert and feels fine  (Exception: SIMPLE FAINT due to stress, pain, prolonged standing, or suddenly standing)    Additional Information   Negative: Still unconscious   Negative: Difficult to awaken or acting confused (e.g., disoriented, slurred speech)   Negative: Shock suspected (e.g., cold/pale/clammy skin, too weak to stand, low BP, rapid pulse)   Negative: Difficulty breathing   Negative: Bluish (or gray) lips or face now   Negative: Chest pain   Negative: Extra heartbeats, irregular heart beating, or heart is beating very fast  (i.e., \"palpitations\")   Negative: Bleeding (e.g., vomiting blood, rectal bleeding or tarry stools, severe vaginal bleeding)  (Exception: Fainted from sight of small amount of blood; small cut or abrasion.)   Negative: Fainted suddenly after medicine, allergic food or bee sting   Negative: Age > 50 years  (Exception: Occurred > 1 hour ago AND now feels completely fine.)   Negative: History of heart problems (e.g., congestive heart failure, heart attack)   Negative: [1] Fainted > 15 minutes ago AND [2] still feels too weak or dizzy to stand   " Negative: Sounds like a life-threatening emergency to the triager   Negative: [1] Diabetes mellitus AND [2] fainting from low blood glucose (i.e.,  70 mg/dl [3.9 mmol/l] or below)   Negative: Seizure suspected (e.g., muscle jerking or shaking followed by confusion)   Negative: Heat exhaustion suspected (i.e., dehydration from heat exposure)   Negative: [1] Fainted > 15 minutes ago AND [2] still looks pale (pale skin, pallor)   Negative: [1] Fainted > 15 minutes ago AND [2] still feels weak or dizzy   Negative: Occurred during exercise   Negative: Any head or face injury   Negative: Pregnant or possibly pregnant   Negative: Fainted 2 times in one day   Negative: [1] Drinking very little AND [2] dehydration suspected (e.g., no urine > 12 hours, very dry mouth, very lightheaded)   Negative: [1] Age > 50 years  AND [2] now alert and feels fine   Negative: Patient sounds very sick or weak to the triager    Protocols used: [id number]

## 2025-06-12 NOTE — PROGRESS NOTES
Assessment & Plan     Syncope, unspecified syncope type  Patient is a 39 year old female who presents today with her spouse following a syncopal episode with LOC this morning at 2am. Spouse informs me that he heard a thud and went into the bathroom to find the patient laying between the toilet and wall. Spouse was able to move her to the bedroom floor, placing her in a supine position. She woke shortly after this. EMS was not activated. No confusion, persistent fatigue or other lingering symptoms present. Patient recalls that she had gotten up to use the restroom and was feeling uneasy so she laid down on the BR floor. At one point while laying on the floor the patient said that she felt like she needed to have a BM, so she raina to sit on the toilet. Once she was sitting on the toilet she recalls tunnel vision and feeling sweaty. Per the patient she had a similar syncopal episode with these symptoms 1 year ago [place]. Leading up to this morning's syncopal episode patient recalls increased stress as father in law has been living with them for 6 weeks. She denies changes to diet, medications, recent illness or injury or use of substances. Vitals normal with mildly elevated heart rate. Exam unremarkable. EKG normal as well. Will update labs today and have patch wear ziopatch for 3 days. I discussed suspicion for reflex vs vasovagal syncope. Will have patient monitor behaviors and detail any recurrence. Attached reference material.   - EKG 12-lead complete w/read - Clinics  - CBC with platelets and differential; Future  - Comprehensive metabolic panel (BMP + Alb, Alk Phos, ALT, AST, Total. Bili, TP); Future  - TSH with free T4 reflex; Future  - Hemoglobin A1c; Future  - Magnesium; Future  - ZIO PATCH MAIL OUT; Future  - CBC with platelets and differential  - Comprehensive metabolic panel (BMP + Alb, Alk Phos, ALT, AST, Total. Bili, TP)  - TSH with free T4 reflex  - Hemoglobin A1c  -  "Magnesium    BMI  Estimated body mass index is 30.8 kg/m  as calculated from the following:    Height as of this encounter: 1.657 m (5' 5.25\").    Weight as of this encounter: 84.6 kg (186 lb 8 oz).   Weight management plan: Discussed healthy diet and exercise guidelines    Lima Barker is a 39 year old, presenting for the following health issues:  Syncope        6/12/2025     7:20 AM   Additional Questions   Roomed by Shivani BARROS   Accompanied by Talib-     History of Present Illness       Reason for visit:  Fainted  Symptom onset:  Today  Symptoms include:  Nothing now  Symptom progression:  Improving  Had these symptoms before:  Yes  Has tried/received treatment for these symptoms:  No  What makes it worse:  No  What makes it better:  Im not sure   She is taking medications regularly.      Just wondering if there will be any blood work to figure out what happened or what would be suggested.     Triage call   calling consent to communicate on file.  This morning at 2 am he heard a  thud  wheezing  he went into the bathroom and she was agonal  breathing  she stopped breathing  and was unconscious.  He dragged her out of the bathroom and was going to start cpr when she woke up.  She denied feeling dizzy she said she felt really hoot before she passed out.  She was sitting on the toilet  when it happened and did not hurt herself.  She says this has happened one time before although the  doesn't remember  it and says he was not there for that.     Per protocol see HCP with in 4 hours or PCP triage.Care advice given.  Verbalizes understanding and agrees with plan. Transferred to scheduling.     Jihan Wilburn RN   [facility] Nurse Advisor  6:53 AM 6/12/2025     Reason for Disposition   [1] All other patients AND [2] now alert and feels fine  (Exception: SIMPLE FAINT due to stress, pain, prolonged standing, or suddenly standing)    Additional Information   Negative: Still unconscious   " "Negative: Difficult to awaken or acting confused (e.g., disoriented, slurred speech)   Negative: Shock suspected (e.g., cold/pale/clammy skin, too weak to stand, low BP, rapid pulse)   Negative: Difficulty breathing   Negative: Bluish (or gray) lips or face now   Negative: Chest pain   Negative: Extra heartbeats, irregular heart beating, or heart is beating very fast  (i.e., \"palpitations\")   Negative: Bleeding (e.g., vomiting blood, rectal bleeding or tarry stools, severe vaginal bleeding)  (Exception: Fainted from sight of small amount of blood; small cut or abrasion.)   Negative: Fainted suddenly after medicine, allergic food or bee sting   Negative: Age > 50 years  (Exception: Occurred > 1 hour ago AND now feels completely fine.)   Negative: History of heart problems (e.g., congestive heart failure, heart attack)   Negative: [1] Fainted > 15 minutes ago AND [2] still feels too weak or dizzy to stand   Negative: Sounds like a life-threatening emergency to the triager   Negative: [1] Diabetes mellitus AND [2] fainting from low blood glucose (i.e.,  70 mg/dl [3.9 mmol/l] or below)   Negative: Seizure suspected (e.g., muscle jerking or shaking followed by confusion)   Negative: Heat exhaustion suspected (i.e., dehydration from heat exposure)   Negative: [1] Fainted > 15 minutes ago AND [2] still looks pale (pale skin, pallor)   Negative: [1] Fainted > 15 minutes ago AND [2] still feels weak or dizzy   Negative: Occurred during exercise   Negative: Any head or face injury   Negative: Pregnant or possibly pregnant   Negative: Fainted 2 times in one day   Negative: [1] Drinking very little AND [2] dehydration suspected (e.g., no urine > 12 hours, very dry mouth, very lightheaded)   Negative: [1] Age > 50 years  AND [2] now alert and feels fine   Negative: Patient sounds very sick or weak to the triager    Protocols used: [id number]      Review of Systems  Constitutional, HEENT, cardiovascular, pulmonary, gi and gu " "systems are negative, except as otherwise noted.      Objective    /68   Pulse 106   Temp 97.6  F (36.4  C) (Temporal)   Resp 16   Ht 1.657 m (5' 5.25\")   Wt 84.6 kg (186 lb 8 oz)   LMP 05/26/2025   SpO2 98%   BMI 30.80 kg/m    Body mass index is 30.8 kg/m .  Physical Exam   GENERAL: alert and no distress  EYES: Eyes grossly normal to inspection, PERRL and conjunctivae and sclerae normal  HENT: ear canals and TM's normal, nose and mouth without ulcers or lesions  NECK: no adenopathy, no asymmetry, masses, or scars  RESP: lungs clear to auscultation - no rales, rhonchi or wheezes  CV: regular rate and rhythm, normal S1 S2, no S3 or S4, no murmur, click or rub, no peripheral edema  ABDOMEN: soft, nontender, no hepatosplenomegaly, no masses and bowel sounds normal  MS: no gross musculoskeletal defects noted, no edema  NEURO: Normal strength and tone, mentation intact and speech normal  PSYCH: mentation appears normal, affect normal/bright    Results for orders placed or performed in visit on 06/12/25   Hemoglobin A1c     Status: Normal   Result Value Ref Range    Estimated Average Glucose 103 <117 mg/dL    Hemoglobin A1C 5.2 0.0 - 5.6 %   CBC with platelets and differential     Status: None   Result Value Ref Range    WBC Count 8.3 4.0 - 11.0 10e3/uL    RBC Count 5.01 3.80 - 5.20 10e6/uL    Hemoglobin 14.2 11.7 - 15.7 g/dL    Hematocrit 43.3 35.0 - 47.0 %    MCV 86 78 - 100 fL    MCH 28.3 26.5 - 33.0 pg    MCHC 32.8 31.5 - 36.5 g/dL    RDW 12.9 10.0 - 15.0 %    Platelet Count 213 150 - 450 10e3/uL    % Neutrophils 74 %    % Lymphocytes 20 %    % Monocytes 4 %    % Eosinophils 1 %    % Basophils 0 %    % Immature Granulocytes 1 %    Absolute Neutrophils 6.1 1.6 - 8.3 10e3/uL    Absolute Lymphocytes 1.7 0.8 - 5.3 10e3/uL    Absolute Monocytes 0.4 0.0 - 1.3 10e3/uL    Absolute Eosinophils 0.1 0.0 - 0.7 10e3/uL    Absolute Basophils 0.0 0.0 - 0.2 10e3/uL    Absolute Immature Granulocytes 0.1 <=0.4 10e3/uL "   CBC with platelets and differential     Status: None    Narrative    The following orders were created for panel order CBC with platelets and differential.  Procedure                               Abnormality         Status                     ---------                               -----------         ------                     CBC with platelets and ...[[id number]]                      Final result                 Please view results for these tests on the individual orders.     Signed Electronically by: Xavi Maldonado PA-C

## 2025-06-12 NOTE — TELEPHONE ENCOUNTER
"Call from patient  reporting that the patient passed out at 0200 and was found to be having agonal breathing and her lips turned blue.     Patient woke up shortly after and was \" normal \" at time of call patient spouse did not call 911.     When patient spouse called to speak with writer he was not in the company of the patient. Writer advised that patient would need to be present in order for triage to be completed. Patient spouse to call back when he is with the patient for triage.   "

## 2025-06-26 DIAGNOSIS — R42 DIZZINESS: ICD-10-CM

## 2025-06-26 DIAGNOSIS — R55 SYNCOPE, UNSPECIFIED SYNCOPE TYPE: Primary | ICD-10-CM

## 2025-06-26 RX ORDER — MECLIZINE HYDROCHLORIDE 25 MG/1
25 TABLET ORAL 3 TIMES DAILY PRN
Qty: 60 TABLET | Refills: 0 | Status: SHIPPED | OUTPATIENT
Start: 2025-06-26

## 2025-06-30 LAB — CV ZIO PRELIM RESULTS: NORMAL

## 2025-07-07 LAB — CV ZIO PRELIM RESULTS: NORMAL
